# Patient Record
Sex: FEMALE | Race: WHITE | Employment: OTHER | URBAN - METROPOLITAN AREA
[De-identification: names, ages, dates, MRNs, and addresses within clinical notes are randomized per-mention and may not be internally consistent; named-entity substitution may affect disease eponyms.]

---

## 2017-04-10 ENCOUNTER — OFFICE VISIT (OUTPATIENT)
Dept: NEUROLOGY | Age: 77
End: 2017-04-10

## 2017-04-10 VITALS
SYSTOLIC BLOOD PRESSURE: 150 MMHG | BODY MASS INDEX: 39.7 KG/M2 | HEART RATE: 72 BPM | HEIGHT: 60 IN | WEIGHT: 202.2 LBS | DIASTOLIC BLOOD PRESSURE: 80 MMHG | TEMPERATURE: 97.8 F | RESPIRATION RATE: 18 BRPM | OXYGEN SATURATION: 96 %

## 2017-04-10 DIAGNOSIS — R20.2 PARESTHESIA: ICD-10-CM

## 2017-04-10 DIAGNOSIS — G45.9 TRANSIENT CEREBRAL ISCHEMIA, UNSPECIFIED TYPE: ICD-10-CM

## 2017-04-10 DIAGNOSIS — G43.109 MIGRAINE WITH VERTIGO: ICD-10-CM

## 2017-04-10 DIAGNOSIS — G45.0 VERTEBROBASILAR ARTERY SYNDROME: ICD-10-CM

## 2017-04-10 DIAGNOSIS — F41.9 ANXIETY: Primary | ICD-10-CM

## 2017-04-10 DIAGNOSIS — R42 DIZZINESS: ICD-10-CM

## 2017-04-10 RX ORDER — PREGABALIN 50 MG/1
50 CAPSULE ORAL 2 TIMES DAILY
Qty: 60 CAP | Refills: 2 | Status: SHIPPED | OUTPATIENT
Start: 2017-04-10 | End: 2017-08-09 | Stop reason: ALTCHOICE

## 2017-04-10 RX ORDER — MECLIZINE HYDROCHLORIDE 25 MG/1
TABLET ORAL
COMMUNITY
End: 2017-04-10 | Stop reason: SDUPTHER

## 2017-04-10 RX ORDER — FOLIC ACID 1 MG/1
TABLET ORAL DAILY
COMMUNITY
End: 2018-11-29 | Stop reason: SDUPTHER

## 2017-04-10 RX ORDER — DIAZEPAM 10 MG/1
10 TABLET ORAL
COMMUNITY
End: 2017-04-10 | Stop reason: SDUPTHER

## 2017-04-10 RX ORDER — METHOTREXATE 2.5 MG/1
TABLET ORAL
COMMUNITY

## 2017-04-10 RX ORDER — LEVOTHYROXINE SODIUM 75 UG/1
TABLET ORAL
Refills: 0 | COMMUNITY
Start: 2017-03-16 | End: 2017-04-10 | Stop reason: SDUPTHER

## 2017-04-10 RX ORDER — CHOLECALCIFEROL (VITAMIN D3) 125 MCG
CAPSULE ORAL DAILY
COMMUNITY
End: 2022-10-26

## 2017-04-10 RX ORDER — DIAZEPAM 10 MG/1
10 TABLET ORAL
Qty: 60 TAB | Refills: 3 | Status: SHIPPED | OUTPATIENT
Start: 2017-04-10 | End: 2017-08-09 | Stop reason: SDUPTHER

## 2017-04-10 RX ORDER — HYDROXYCHLOROQUINE SULFATE 200 MG/1
200 TABLET, FILM COATED ORAL DAILY
COMMUNITY

## 2017-04-10 RX ORDER — MECLIZINE HYDROCHLORIDE 25 MG/1
25 TABLET ORAL
Qty: 60 TAB | Refills: 3 | Status: SHIPPED | OUTPATIENT
Start: 2017-04-10 | End: 2017-08-09 | Stop reason: SDUPTHER

## 2017-04-10 RX ORDER — VERAPAMIL HYDROCHLORIDE 120 MG/1
120 CAPSULE, EXTENDED RELEASE ORAL DAILY
COMMUNITY
End: 2017-08-09 | Stop reason: SDUPTHER

## 2017-04-10 RX ORDER — ASPIRIN 81 MG/1
TABLET ORAL DAILY
COMMUNITY

## 2017-04-10 RX ORDER — LEVOTHYROXINE SODIUM 75 UG/1
75 TABLET ORAL
Refills: 0 | COMMUNITY
Start: 2017-01-18

## 2017-04-10 RX ORDER — PREDNISONE 5 MG/1
5 TABLET ORAL 2 TIMES DAILY
Qty: 30 TAB | Refills: 0 | Status: SHIPPED | OUTPATIENT
Start: 2017-04-10 | End: 2017-08-09

## 2017-04-10 NOTE — PROGRESS NOTES
Chief Complaint   Patient presents with    Stroke    Medication Refill    New Patient     Sample for Lyrica 50 mg 1 packet of 21 capsules  NDC: 07631-580-56  : Alberto Chowdhury  Lot: [id number]  Expires: 8/2019

## 2017-04-10 NOTE — PROGRESS NOTES
Neurology Progress Note    NAME:  Cecilia Carter   :   1940   MRN:   [id number]     Date/Time:  4/10/2017  Subjective: Cecilia Carter is a 78 y.o. female here today for follow up. Has been having a lot neck pain. Occasional dizziness and headache. No fall. Was recently in the hospital due  SOB and possible pneumonia. Review of Systems:  Per HPI - Otherwise 12 point ROS was negative     []Unable to obtain  ROS due to  []mental status change  []sedated   []intubated    Medications reviewed:  Current Outpatient Prescriptions   Medication Sig Dispense Refill    levothyroxine (SYNTHROID) 100 mcg tablet 120 mcg daily. 0    cholecalciferol, vitamin D3, (VITAMIN D3) 2,000 unit tab Take  by mouth daily.  TIOTROPIUM BROMIDE (SPIRIVA WITH HANDIHALER IN) Take 2 Puffs by inhalation daily.  verapamil ER (VERELAN) 120 mg ER capsule Take 120 mg by mouth daily.  aspirin delayed-release 81 mg tablet Take  by mouth daily.  hydroxychloroquine (PLAQUENIL) 200 mg tablet Take 200 mg by mouth two (2) times a day.  methotrexate (RHEUMATREX) 2.5 mg tablet Take  by mouth every . 6 capsules      folic acid (FOLVITE) 1 mg tablet Take  by mouth daily.  diazePAM (VALIUM) 10 mg tablet Take 1 Tab by mouth every twelve (12) hours as needed for Anxiety. Max Daily Amount: 20 mg. 60 Tab 3    meclizine (ANTIVERT) 25 mg tablet Take 1 Tab by mouth nightly. 60 Tab 3    Oxygen nightly. 2 liters      pregabalin (LYRICA) 50 mg capsule Take 1 Cap by mouth two (2) times a day. Max Daily Amount: 100 mg. 60 Cap 2        Objective:   Vitals:  Vitals:    04/10/17 1351   BP: 150/80   Pulse: 72   Resp: 18   Temp: 97.8 °F (36.6 °C)   TempSrc: Oral   SpO2: 96%   Weight: 202 lb 3.2 oz (91.7 kg)   Height: 5' (1.524 m)   PainSc:   0 - No pain       PHYSICAL EXAM:  General:    Alert, cooperative, no distress, appears stated age. Head:   Normocephalic, without obvious abnormality, atraumatic.   Eyes: Conjunctivae/corneas clear. PERRLA  Nose:  Nares normal. No drainage or sinus tenderness. Throat:    Lips, mucosa, and tongue normal.  No Thrush  Neck:  Supple, symmetrical,  no adenopathy, thyroid: non tender    no carotid bruit and no JVD. Back:    Symmetric,  No CVA tenderness. Lungs:   Clear to auscultation bilaterally. No Wheezing or Rhonchi. No rales. Chest wall:  No tenderness or deformity. No Accessory muscle use. Heart:   Regular rate and rhythm,  no murmur, rub or gallop. Abdomen:   Soft, non-tender. Not distended. Bowel sounds normal. No masses  Extremities: Extremities normal, atraumatic, No cyanosis. No edema. No clubbing  Skin:     Texture, turgor normal. No rashes or lesions. Not Jaundiced  Lymph nodes: Cervical, supraclavicular normal.  Psych:  Good insight. Not depressed. Not anxious or agitated. NEUROLOGICAL EXAM:  Appearance: The patient is well developed, well nourished, provides a coherent history and is in no acute distress. Mental Status: Oriented to time, place and person. Mood and affect appropriate. Cranial Nerves:   Intact visual fields. Fundi are benign. GITA, EOM's full, no nystagmus, no ptosis. Facial sensation is normal. Corneal reflexes are intact. Facial movement is symmetric. Hearing is normal bilaterally. Palate is midline with normal sternocleidomastoid and trapezius muscles are normal. Tongue is midline. Motor:  5/5 strength in upper and lower proximal and distal muscles. Normal bulk and tone. No fasciculations. Reflexes:   Deep tendon reflexes 2+/4 and symmetrical.   Sensory:   Normal to touch, pinprick and vibration. Gait:  Slightly unsteady gait. Tremor:   No tremor noted. Cerebellar:  No cerebellar signs present. Neurovascular:  Normal heart sounds and regular rhythm, peripheral pulses intact, and no carotid bruits. Lab Data Reviewed:    No results found for any previous visit.     CT Results (recent):  No results found for this or any previous visit. MRI Results (recent):  No results found for this or any previous visit. IR Results (recent):  No results found for this or any previous visit. VAS/US Results (recent):  No results found for this or any previous visit. Assesment  There is no problem list on file for this patient.     ___________________________________________________  PLAN:      ICD-10-CM ICD-9-CM    1. Anxiety F41.9 300.00 diazePAM (VALIUM) 10 mg tablet      meclizine (ANTIVERT) 25 mg tablet   2. 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 Migraine with vertigo G43.109 346.00    5. Paresthesia R20.2 782.0 MRI BRAIN W WO CONT     Follow-up Disposition:  Return in about 4 weeks (around 5/8/2017).            ___________________________________________________    Total time spent with patient:  []15   []25   []35   [] __ minutes    Care Plan discussed with:    []Patient   []Family    []Care Manager   []Consultant/Specialist :    ___________________________________________________    Attending Physician: Baljit Jara MD

## 2017-04-10 NOTE — MR AVS SNAPSHOT
Visit Information Date & Time Provider Department Dept. Phone Encounter #  
 4/10/2017  1:40 PM Gavin Peters MD [facility] at [facility] [phone] [id number] Follow-up Instructions Return in about 4 weeks (around 5/8/2017). Upcoming Health Maintenance Date Due DTaP/Tdap/Td series (1 - Tdap) 3/4/1963 ZOSTER VACCINE AGE 60> 3/4/2002 GLAUCOMA SCREENING Q2Y 3/4/2007 OSTEOPOROSIS SCREENING (DEXA) 3/4/2007 Pneumococcal 65+ Low/Medium Risk (1 of 2 - PCV13) 3/4/2007 MEDICARE YEARLY EXAM 3/4/2007 INFLUENZA AGE 9 TO ADULT 8/1/2016 Allergies as of 4/10/2017  Never Reviewed Severity Noted Reaction Type Reactions Codeine  04/10/2017    Swelling Erythromycin  04/10/2017    Itching And swelling Lovastatin  04/10/2017    Swelling Minocin [Minocycline]  04/10/2017    Swelling Neurontin [Gabapentin]  04/10/2017    Swelling Pcn [Penicillins]  04/10/2017    Swelling Sulfa (Sulfonamide Antibiotics)  04/10/2017    Itching Current Immunizations  Never Reviewed No immunizations on file. Not reviewed this visit You Were Diagnosed With   
  
 Codes Comments Anxiety    -  Primary ICD-10-CM: F41.9 ICD-9-CM: 300.00 Dizziness     ICD-10-CM: A78 ICD-9-CM: 780.4 Vertebrobasilar artery syndrome     ICD-10-CM: G45.0 ICD-9-CM: 435.3 Migraine with vertigo     ICD-10-CM: G43.109 ICD-9-CM: 346.00 Paresthesia     ICD-10-CM: R20.2 ICD-9-CM: 525. 0 Vitals BP Pulse Temp Resp Height(growth percentile) Weight(growth percentile) 150/80 (BP 1 Location: Left arm, BP Patient Position: Sitting) 72 97.8 °F (36.6 °C) (Oral) 18 5' (1.524 m) 202 lb 3.2 oz (91.7 kg) SpO2 BMI OB Status Smoking Status 96% 39.49 kg/m2 Menopause Former Smoker Vitals History BMI and BSA Data Body Mass Index Body Surface Area  
 39.49 kg/m 2 1.97 m 2 Preferred Pharmacy Pharmacy Name Phone [facility] [address] [place], [place] - [address] [place] [address] [place] [phone] Your Updated Medication List  
  
   
This list is accurate as of: 4/10/17  2:58 PM.  Always use your most recent med list.  
  
  
  
  
 aspirin delayed-release 81 mg tablet Take  by mouth daily. diazePAM 10 mg tablet Commonly known as:  VALIUM Take 1 Tab by mouth every twelve (12) hours as needed for Anxiety. Max Daily Amount: 20 mg.  
  
 folic acid 1 mg tablet Commonly known as:  Google Take  by mouth daily. hydroxychloroquine 200 mg tablet Commonly known as:  PLAQUENIL Take 200 mg by mouth two (2) times a day. levothyroxine 100 mcg tablet Commonly known as:  SYNTHROID  
120 mcg daily. meclizine 25 mg tablet Commonly known as:  ANTIVERT Take 1 Tab by mouth nightly. methotrexate 2.5 mg tablet Commonly known as:  [place] Take  by mouth every Sunday. 6 capsules Oxygen  
nightly. 2 liters  
  
 pregabalin 50 mg capsule Commonly known as:  Karolynn Arch Take 1 Cap by mouth two (2) times a day. Max Daily Amount: 100 mg. [address] [place] [address] Take 2 Puffs by inhalation daily. verapamil  mg ER capsule Commonly known as:  Luz Enriqueta Take 120 mg by mouth daily. VITAMIN D3 2,000 unit Tab Generic drug:  cholecalciferol (vitamin D3) Take  by mouth daily. Prescriptions Printed Refills  
 diazePAM (VALIUM) 10 mg tablet 3 Sig: Take 1 Tab by mouth every twelve (12) hours as needed for Anxiety. Max Daily Amount: 20 mg.  
 Class: Print Route: Oral  
 pregabalin (LYRICA) 50 mg capsule 2 Sig: Take 1 Cap by mouth two (2) times a day. Max Daily Amount: 100 mg. Class: Print Route: Oral  
  
Prescriptions Sent to Pharmacy Refills  
 meclizine (ANTIVERT) 25 mg tablet 3 Sig: Take 1 Tab by mouth nightly.   
 Class: Normal  
 Pharmacy: [address], [place] Ph #: [phone] Route: Oral  
  
Follow-up Instructions Return in about 4 weeks (around 5/8/2017). To-Do List   
 04/18/2017 Imaging:  MRI BRAIN W WO CONT Introducing [facility] SERVICES! Marli Lehman introduces [employer] patient portal. Now you can access parts of your medical record, email your doctor's office, and request medication refills online. 1. In your internet browser, go to [email or web address] 2. Click on the First Time User? Click Here link in the Sign In box. You will see the New Member Sign Up page. 3. Enter your [employer] Access Code exactly as it appears below. You will not need to use this code after youve completed the sign-up process. If you do not sign up before the expiration date, you must request a new code. · [employer] Access Code: [facility] Expires: 7/9/2017  1:49 PM 
 
4. Enter the last four digits of your Social Security Number (xxxx) and Date of Birth (mm/dd/yyyy) as indicated and click Submit. You will be taken to the next sign-up page. 5. Create a [employer] ID. This will be your [employer] login ID and cannot be changed, so think of one that is secure and easy to remember. 6. Create a [employer] password. You can change your password at any time. 7. Enter your Password Reset Question and Answer. This can be used at a later time if you forget your password. 8. Enter your e-mail address. You will receive e-mail notification when new information is available in [address] 9. Click Sign Up. You can now view and download portions of your medical record. 10. Click the Download Summary menu link to download a portable copy of your medical information. If you have questions, please visit the Frequently Asked Questions section of the [employer] website. Remember, [employer] is NOT to be used for urgent needs. For medical emergencies, dial 911. Now available from your iPhone and Android! Please provide this summary of care documentation to your next provider. Your primary care clinician is listed as Jeanette Lam. If you have any questions after today's visit, please call [phone].

## 2017-04-19 ENCOUNTER — HOSPITAL ENCOUNTER (OUTPATIENT)
Dept: MRI IMAGING | Age: 77
Discharge: HOME | End: 2017-04-19
Attending: PSYCHIATRY & NEUROLOGY
Payer: MEDICARE

## 2017-04-19 DIAGNOSIS — R20.2 PARESTHESIA: ICD-10-CM

## 2017-04-19 DIAGNOSIS — R42 DIZZINESS: ICD-10-CM

## 2017-04-19 DIAGNOSIS — G45.0 VERTEBROBASILAR ARTERY SYNDROME: ICD-10-CM

## 2017-04-19 LAB — CREAT BLD-MCNC: 0.9 MG/DL (ref 0.6–1.3)

## 2017-04-19 PROCEDURE — A9585 GADOBUTROL INJECTION: HCPCS | Performed by: PSYCHIATRY & NEUROLOGY

## 2017-04-19 PROCEDURE — 82565 ASSAY OF CREATININE: CPT

## 2017-04-19 PROCEDURE — 70553 MRI BRAIN STEM W/O & W/DYE: CPT

## 2017-04-19 PROCEDURE — 74011250636 HC RX REV CODE- 250/636: Performed by: PSYCHIATRY & NEUROLOGY

## 2017-04-19 RX ADMIN — GADOBUTROL 9 ML: 604.72 INJECTION INTRAVENOUS at 16:02

## 2017-05-09 ENCOUNTER — OFFICE VISIT (OUTPATIENT)
Dept: NEUROLOGY | Age: 77
End: 2017-05-09

## 2017-05-09 VITALS
SYSTOLIC BLOOD PRESSURE: 125 MMHG | WEIGHT: 204.2 LBS | HEIGHT: 60 IN | RESPIRATION RATE: 18 BRPM | DIASTOLIC BLOOD PRESSURE: 75 MMHG | OXYGEN SATURATION: 94 % | TEMPERATURE: 97.1 F | BODY MASS INDEX: 40.09 KG/M2 | HEART RATE: 75 BPM

## 2017-05-09 DIAGNOSIS — R42 DIZZINESS: ICD-10-CM

## 2017-05-09 DIAGNOSIS — G43.109 MIGRAINE WITH VERTIGO: ICD-10-CM

## 2017-05-09 DIAGNOSIS — M47.812 ARTHROPATHY OF CERVICAL FACET JOINT: ICD-10-CM

## 2017-05-09 DIAGNOSIS — G45.0 VERTEBROBASILAR ARTERY SYNDROME: ICD-10-CM

## 2017-05-09 DIAGNOSIS — R20.2 PARESTHESIA: ICD-10-CM

## 2017-05-09 DIAGNOSIS — G43.019 INTRACTABLE MIGRAINE WITHOUT AURA AND WITHOUT STATUS MIGRAINOSUS: Primary | ICD-10-CM

## 2017-05-09 RX ORDER — TIOTROPIUM BROMIDE INHALATION SPRAY 3.12 UG/1
SPRAY, METERED RESPIRATORY (INHALATION)
COMMUNITY
Start: 2017-04-18 | End: 2017-05-09 | Stop reason: SDUPTHER

## 2017-05-09 NOTE — MR AVS SNAPSHOT
Visit Information Date & Time Provider Department Dept. Phone Encounter #  
 5/9/2017 10:40 AM Gavin Plata MD [facility] at [address] [place] [address] [id number] Follow-up Instructions Return in about 3 months (around 8/9/2017). Upcoming Health Maintenance Date Due DTaP/Tdap/Td series (1 - Tdap) 3/4/1963 ZOSTER VACCINE AGE 60> 3/4/2002 GLAUCOMA SCREENING Q2Y 3/4/2007 OSTEOPOROSIS SCREENING (DEXA) 3/4/2007 Pneumococcal 65+ Low/Medium Risk (1 of 2 - PCV13) 3/4/2007 MEDICARE YEARLY EXAM 3/4/2007 INFLUENZA AGE 9 TO ADULT 8/1/2017 Allergies as of 5/9/2017  Review Complete On: 5/9/2017 By: Vane Pena MD  
  
 Severity Noted Reaction Type Reactions Codeine  04/10/2017    Swelling Erythromycin  04/10/2017    Itching And swelling Lovastatin  04/10/2017    Swelling Minocin [Minocycline]  04/10/2017    Swelling Neurontin [Gabapentin]  04/10/2017    Swelling Pcn [Penicillins]  04/10/2017    Swelling Sulfa (Sulfonamide Antibiotics)  04/10/2017    Itching Current Immunizations  Never Reviewed No immunizations on file. Not reviewed this visit You Were Diagnosed With   
  
 Codes Comments Intractable migraine without aura and without status migrainosus    -  Primary ICD-10-CM: G43.019 
ICD-9-CM: 346.11 Migraine with vertigo     ICD-10-CM: G43.109 ICD-9-CM: 346.00 Paresthesia     ICD-10-CM: R20.2 ICD-9-CM: 898. 0 Vertebrobasilar artery syndrome     ICD-10-CM: G45.0 ICD-9-CM: 435.3 Dizziness     ICD-10-CM: X41 ICD-9-CM: 780.4 Arthropathy of cervical facet joint (HCC)     ICD-10-CM: M12.88 ICD-9-CM: 721.0 Vitals BP Pulse Temp Resp Height(growth percentile) Weight(growth percentile) 125/75 (BP 1 Location: Left arm, BP Patient Position: Sitting) 75 97.1 °F (36.2 °C) (Oral) 18 5' (1.524 m) 204 lb 3.2 oz (92.6 kg) SpO2 BMI OB Status Smoking Status 94% 39.88 kg/m2 Menopause Former Smoker Vitals History BMI and BSA Data Body Mass Index Body Surface Area  
 39.88 kg/m 2 1.98 m 2 Preferred Pharmacy Pharmacy Name Phone [facility] [address] [place], [place] - [address] [place] [address] [employer] [phone] Your Updated Medication List  
  
   
This list is accurate as of: 5/9/17 11:18 AM.  Always use your most recent med list.  
  
  
  
  
 aspirin delayed-release 81 mg tablet Take  by mouth daily. diazePAM 10 mg tablet Commonly known as:  VALIUM Take 1 Tab by mouth every twelve (12) hours as needed for Anxiety. Max Daily Amount: 20 mg.  
  
 folic acid 1 mg tablet Commonly known as:  Google Take  by mouth daily. hydroxychloroquine 200 mg tablet Commonly known as:  PLAQUENIL Take 200 mg by mouth two (2) times a day. levothyroxine 100 mcg tablet Commonly known as:  SYNTHROID  
120 mcg daily. meclizine 25 mg tablet Commonly known as:  ANTIVERT Take 1 Tab by mouth nightly. methotrexate 2.5 mg tablet Commonly known as:  Jannreitta Doll Take  by mouth every Sunday. 6 capsules Oxygen  
nightly. 2 liters  
  
 predniSONE 5 mg tablet Commonly known as:  Matt Halle Take 1 Tab by mouth two (2) times a day. pregabalin 50 mg capsule Commonly known as:  Lexis Whittington Take 1 Cap by mouth two (2) times a day. Max Daily Amount: 100 mg. [address] [place] [address] Take 2 Puffs by inhalation daily. verapamil  mg ER capsule Commonly known as:  [facility] Take 120 mg by mouth daily. VITAMIN D3 2,000 unit Tab Generic drug:  cholecalciferol (vitamin D3) Take  by mouth daily. Follow-up Instructions Return in about 3 months (around 8/9/2017). Introducing [facility] SERVICES! Malik Gibbons introduces [employer] patient portal. Now you can access parts of your medical record, email your doctor's office, and request medication refills online. 1. In your internet browser, go to [email or web address] 2. Click on the First Time User? Click Here link in the Sign In box. You will see the New Member Sign Up page. 3. Enter your [employer] Access Code exactly as it appears below. You will not need to use this code after youve completed the sign-up process. If you do not sign up before the expiration date, you must request a new code. · [employer] Access Code: [address] [place] Expires: 7/9/2017  1:49 PM 
 
4. Enter the last four digits of your Social Security Number (xxxx) and Date of Birth (mm/dd/yyyy) as indicated and click Submit. You will be taken to the next sign-up page. 5. Create a [employer] ID. This will be your [employer] login ID and cannot be changed, so think of one that is secure and easy to remember. 6. Create a [employer] password. You can change your password at any time. 7. Enter your Password Reset Question and Answer. This can be used at a later time if you forget your password. 8. Enter your e-mail address. You will receive e-mail notification when new information is available in [address] 9. Click Sign Up. You can now view and download portions of your medical record. 10. Click the Download Summary menu link to download a portable copy of your medical information. If you have questions, please visit the Frequently Asked Questions section of the [employer] website. Remember, [employer] is NOT to be used for urgent needs. For medical emergencies, dial 911. Now available from your iPhone and Android! Please provide this summary of care documentation to your next provider. Your primary care clinician is listed as Ricardo Vasquez. If you have any questions after today's visit, please call [phone].

## 2017-05-09 NOTE — PROGRESS NOTES
Chief Complaint   Patient presents with    Dizziness    Other     MRI results     1. Have you been to the ER, urgent care clinic since your last visit? Hospitalized since your last visit? no    2. Have you seen or consulted any other health care providers outside of the [address] [place] [address] since your last visit? Include any pap smears or colon screening.  no

## 2017-05-09 NOTE — PROGRESS NOTES
Neurology Progress Note    NAME:  Susie Britt   :   1940   MRN:   [id number]     Date/Time:  2017  Subjective: Susie Britt is a 78 y.o. female here today for follow up. Says she has been having daily headache nagging in nature. Dizziness every night. Says headache has not been very severe, medication has been helping. Dizziness is mostly on trying to lay down. Headache is not associted with nausea or vomiting. .Denies blurry or double vision,diarrhea or constipation. Admits neck and joint pains. Review of Systems:   12 system review negative except for HPI. []Unable to obtain  ROS due to  []mental status change  []sedated   []intubated    Medications reviewed:  Current Outpatient Prescriptions   Medication Sig Dispense Refill    levothyroxine (SYNTHROID) 100 mcg tablet 120 mcg daily. 0    cholecalciferol, vitamin D3, (VITAMIN D3) 2,000 unit tab Take  by mouth daily.  TIOTROPIUM BROMIDE (SPIRIVA WITH HANDIHALER IN) Take 2 Puffs by inhalation daily.  verapamil ER (VERELAN) 120 mg ER capsule Take 120 mg by mouth daily.  aspirin delayed-release 81 mg tablet Take  by mouth daily.  hydroxychloroquine (PLAQUENIL) 200 mg tablet Take 200 mg by mouth two (2) times a day.  methotrexate (RHEUMATREX) 2.5 mg tablet Take  by mouth every . 6 capsules      folic acid (FOLVITE) 1 mg tablet Take  by mouth daily.  diazePAM (VALIUM) 10 mg tablet Take 1 Tab by mouth every twelve (12) hours as needed for Anxiety. Max Daily Amount: 20 mg. 60 Tab 3    meclizine (ANTIVERT) 25 mg tablet Take 1 Tab by mouth nightly. 60 Tab 3    Oxygen nightly. 2 liters      pregabalin (LYRICA) 50 mg capsule Take 1 Cap by mouth two (2) times a day. Max Daily Amount: 100 mg. 60 Cap 2    predniSONE (DELTASONE) 5 mg tablet Take 1 Tab by mouth two (2) times a day.  30 Tab 0        Objective:   Vitals:  Vitals:    17 1043   BP: 125/75   Pulse: 75   Resp: 18   Temp: 97.1 °F (36.2 °C)   TempSrc: Oral SpO2: 94%   Weight: 204 lb 3.2 oz (92.6 kg)   Height: 5' (1.524 m)   PainSc:   0 - No pain               Lab Data Reviewed:  No results found for: WBC, HCT, HGB, PLT, HCTEXT, HGBEXT, PLTEXT, HCTEXT, HGBEXT, PLTEXT    No results found for: NA, K, CL, CO2, GLU, BUN, CREA, CA    No components found for: TROPQUANT    No results found for: PAMELA      No results found for: HBA1C, HGBE8, ECN6HIVU, QMW9XEPI, DIO6CFCB     No results found for: B12LT, FOL, RBCF    No results found for: PAMELA, ANARX, ANAIGG, XBANA    No results found for: CHOL, CHOLPOCT, CHOLX, CHLST, CHOLV, HDL, HDLPOC, LDL, LDLCPOC, NLDLCT, DLDL, LDLC, DLDLP, VLDLC, VLDL, TGL, TGLX, TRIGL, [id number], TRIGP, TGLPOCT, CHHD, CHHDX      CT Results (recent):  No results found for this or any previous visit. MRI Results (recent):    Results from [place] encounter on 04/19/17   MRI BRAIN W WO CONT   Narrative Clinical indication: Dizziness, vertebral basilar artery syndrome, paresthesia,  vertigo. Technical factors: Diffusion imaging, sagittal T1-weighted, axial T1-weighted  pre and post 9 cc IV Gadavist T2-weighted FLAIR gradient echo coronal  T1-weighted postcontrast    Diffusion imaging does not show acute ischemic changes her. There is an  incidental bone lesion within the posterior right parietal both to at the  convexity which is nonspecific. Correlation with CT of that area could be of  value if clinically appropriate. There is no extra-axial fluid collection,  hemorrhage or shift. Major flow voids at the base of the brain are present. Minimal white matter disease nonspecific but compatible with patient's age and  likely some small vessel ischemia. There is no enhancing lesion or masses her. Impression IMPRESSION: No acute intracranial findings. Minimal white matter disease and  atrophy compatible with age. Right-sided bone lesion as above. IR Results (recent):  No results found for this or any previous visit.     VAS/US Results (recent):  No results found for this or any previous visit.            ___________________________________________________  PLAN:Medication  And MRI Brain reviewed with patient  Continue current management      ICD-10-CM ICD-9-CM    1. Intractable migraine without aura and without status migrainosus G43.019 346.11    2. Migraine with vertigo G43.109 346.00    3. Paresthesia R20.2 782.0    4. 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 Dizziness R42 780.4    6. Arthropathy of cervical facet joint ([address] M12.88 721.0      Follow-up Disposition:  Return in about 3 months (around 8/9/2017).          ___________________________________________________    Total time spent with patient:  []15   []25   []35   [] __ minutes    Care Plan discussed with:    []Patient   []Family    []Care Manager   []Consultant/Specialist :    ___________________________________________________    Attending Physician: Shivam Orozco MD

## 2017-08-09 ENCOUNTER — OFFICE VISIT (OUTPATIENT)
Dept: NEUROLOGY | Age: 77
End: 2017-08-09

## 2017-08-09 VITALS
HEART RATE: 70 BPM | DIASTOLIC BLOOD PRESSURE: 70 MMHG | RESPIRATION RATE: 16 BRPM | SYSTOLIC BLOOD PRESSURE: 132 MMHG | TEMPERATURE: 98.2 F | HEIGHT: 60 IN | WEIGHT: 205.8 LBS | BODY MASS INDEX: 40.4 KG/M2 | OXYGEN SATURATION: 98 %

## 2017-08-09 DIAGNOSIS — R42 DIZZINESS: ICD-10-CM

## 2017-08-09 DIAGNOSIS — F11.90 CHRONIC, CONTINUOUS USE OF OPIOIDS: ICD-10-CM

## 2017-08-09 DIAGNOSIS — F41.9 ANXIETY: ICD-10-CM

## 2017-08-09 DIAGNOSIS — G43.909 MIGRAINE WITHOUT STATUS MIGRAINOSUS, NOT INTRACTABLE, UNSPECIFIED MIGRAINE TYPE: Primary | ICD-10-CM

## 2017-08-09 RX ORDER — DIAZEPAM 10 MG/1
10 TABLET ORAL
Qty: 30 TAB | Refills: 3 | Status: SHIPPED | OUTPATIENT
Start: 2017-08-09 | End: 2018-03-08 | Stop reason: SDUPTHER

## 2017-08-09 RX ORDER — MECLIZINE HYDROCHLORIDE 25 MG/1
25 TABLET ORAL
Qty: 60 TAB | Refills: 3 | Status: SHIPPED | OUTPATIENT
Start: 2017-08-09 | End: 2018-05-14 | Stop reason: SDUPTHER

## 2017-08-09 RX ORDER — VERAPAMIL HYDROCHLORIDE 120 MG/1
120 CAPSULE, EXTENDED RELEASE ORAL DAILY
Qty: 30 CAP | Refills: 3 | Status: SHIPPED | OUTPATIENT
Start: 2017-08-09 | End: 2018-03-08 | Stop reason: SDUPTHER

## 2017-08-09 NOTE — PROGRESS NOTES
Date:  17     Name:  Bettina Parra  :  1940  MRN:  [id number]     PCP:  Sherwin Hutchins MD    Chief Complaint   Patient presents with    Migraine    Dizziness    Medication Refill       HISTORY OF PRESENT ILLNESS: Follow up for migraine, stroke, and dizziness. Patient has been doing well since last visit. She does not have any new concerns. She is not having any migraines and her dizziness is being managed with her meclizine. She follows up with her PCP at [address] [place] [address] every 3 month for lab work. Except as noted above, denies  fever, chills, cough. No CP or SOB. No dysuria, loss of bowel or bladder control. No Weight loss. Appetite good. Sleeping well. No sweats. No edema. No bruising or bleeding. No nausea or vomit. No diarrhea. No frequency, urgency, No depressive sxs. No anxiety. Denies sore throat, nasal congestion, nasal discharge, epistaxis, tinnitus, hearing loss, back pain, muscle pain, or joint pain. Current Outpatient Prescriptions   Medication Sig    diazePAM (VALIUM) 10 mg tablet Take 1 Tab by mouth every twelve (12) hours as needed for Anxiety. Max Daily Amount: 20 mg.    meclizine (ANTIVERT) 25 mg tablet Take 1 Tab by mouth nightly.  verapamil ER (VERELAN) 120 mg ER capsule Take 1 Cap by mouth daily.  levothyroxine (SYNTHROID) 100 mcg tablet 120 mcg daily.  cholecalciferol, vitamin D3, (VITAMIN D3) 2,000 unit tab Take  by mouth daily.  TIOTROPIUM BROMIDE (SPIRIVA WITH HANDIHALER IN) Take 2 Puffs by inhalation daily.  aspirin delayed-release 81 mg tablet Take  by mouth daily.  hydroxychloroquine (PLAQUENIL) 200 mg tablet Take 200 mg by mouth two (2) times a day.  methotrexate (RHEUMATREX) 2.5 mg tablet Take  by mouth every . 6 capsules    folic acid (FOLVITE) 1 mg tablet Take  by mouth daily.  Oxygen nightly. 2 liters     No current facility-administered medications for this visit.       Allergies   Allergen Reactions    Codeine Swelling    Erythromycin Itching     And swelling    Lovastatin Swelling    Minocin [Minocycline] Swelling    Naproxen Drowsiness    Neurontin [Gabapentin] Swelling    Pcn [Penicillins] Swelling    Sulfa (Sulfonamide Antibiotics) Itching    Tramadol Drowsiness     Past Medical History:   Diagnosis Date    Anxiety disorder     Cerebral artery occlusion with cerebral infarction ([facility] [address]     COPD (chronic obstructive pulmonary disease) (HCC)     Diabetes (HCC)     Hypertension     Lupus (HCC)     Migraine     Rheumatoid arteritis     Syncope     Thyroid disease     Vertigo      History reviewed. No pertinent surgical history. Social History     Social History    Marital status:      Spouse name: N/A    Number of children: N/A    Years of education: N/A     Occupational History    Not on file. Social History Main Topics    Smoking status: Former Smoker    Smokeless tobacco: Never Used    Alcohol use No    Drug use: No    Sexual activity: Not on file     Other Topics Concern    Not on file     Social History Narrative     Family History   Problem Relation Age of Onset    No Known Problems Mother     No Known Problems Father     Cancer Sister      breast    Cancer Brother      lung    Cancer Sister      breast    Cancer Sister      breast    Cancer Brother      skin       PHYSICAL EXAMINATION:    Visit Vitals    /70 (BP 1 Location: Left arm, BP Patient Position: Sitting)    Pulse 70    Temp 98.2 °F (36.8 °C) (Temporal)    Resp 16    Ht 5' (1.524 m)    Wt 205 lb 12.8 oz (93.4 kg)    SpO2 98%    BMI 40.19 kg/m2     General:  Well defined, nourished, and groomed individual in no acute distress. Neck: Supple, nontender, no bruits, no pain with resistance to active range of motion. Heart: Regular rate and rhythm, no murmurs, rub, or gallop. Normal S1S2.   Lungs:  Clear to auscultation bilaterally with equal chest expansion, no cough, no wheeze  Musculoskeletal:  Extremities revealed no edema and had full range of motion of joints. Psych:  Good mood and bright affect    NEUROLOGICAL EXAMINATION:     Mental Status:   Alert and oriented to person, place, and time with recent and remote memory intact. Attention span and concentration are normal. Speech is fluent with a full fund of knowledge. Cranial Nerves:    II, III, IV, VI:  Visual acuity grossly intact. Visual fields are normal.    Pupils are equal, round, and reactive to light and accommodation. Extra-ocular movements are full and fluid. Fundoscopic exam was benign, no ptosis or nystagmus. V-XII: Hearing is grossly intact. Facial features are symmetric, with normal sensation and strength. The palate rises symmetrically and the tongue protrudes midline. Sternocleidomastoids 5/5. Motor Examination: Normal tone, bulk, and strength, 5/5 muscle strength throughout. Sensory exam:  Normal throughout to pinprick, temperature, and vibration sense. Normal proprioception. Coordination: Finger to nose and rapid arm movement testing was normal.     Gait and Station:  Steady while walking     Reflexes:  DTRs 2+ throughout. ASSESSMENT AND PLAN    ICD-10-CM ICD-9-CM    1. Migraine without status migrainosus, not intractable, unspecified migraine type G43.909 346.90 verapamil ER (VERELAN) 120 mg ER capsule   2. Anxiety F41.9 300.00 diazePAM (VALIUM) 10 mg tablet      meclizine (ANTIVERT) 25 mg tablet   3. Dizziness R42 780.4 diazePAM (VALIUM) 10 mg tablet      meclizine (ANTIVERT) 25 mg tablet   4. Chronic, continuous use of opioids F11.90 305.51 [facility] [place]    Ms. Arlet Fabian is doing well. Will continue medication as Prescribed. Patient will follow up with PCP for her stroke lab work(LIPID PANEL,hbA1). Would like for her to bring a copy of her labs work at her next visit. Follow up in 3 months.     Estephanie PARISH

## 2017-08-09 NOTE — MR AVS SNAPSHOT
Visit Information Date & Time Provider Department Dept. Phone Encounter #  
 8/9/2017  9:40 AM Gavin Teran MD [facility] at [facility] [phone] [id number] Upcoming Health Maintenance Date Due DTaP/Tdap/Td series (1 - Tdap) 3/4/1963 ZOSTER VACCINE AGE 60> 1/4/2002 GLAUCOMA SCREENING Q2Y 3/4/2007 OSTEOPOROSIS SCREENING (DEXA) 3/4/2007 Pneumococcal 65+ Low/Medium Risk (1 of 2 - PCV13) 3/4/2007 MEDICARE YEARLY EXAM 3/4/2007 INFLUENZA AGE 9 TO ADULT 8/1/2017 Allergies as of 8/9/2017  Review Complete On: 8/9/2017 By: Megan Guzman NP Severity Noted Reaction Type Reactions Codeine  04/10/2017    Swelling Erythromycin  04/10/2017    Itching And swelling Lovastatin  04/10/2017    Swelling Minocin [Minocycline]  04/10/2017    Swelling Naproxen  08/09/2017    Drowsiness Neurontin [Gabapentin]  04/10/2017    Swelling Pcn [Penicillins]  04/10/2017    Swelling Sulfa (Sulfonamide Antibiotics)  04/10/2017    Itching Tramadol  08/09/2017    Drowsiness Current Immunizations  Never Reviewed No immunizations on file. Not reviewed this visit You Were Diagnosed With   
  
 Codes Comments Migraine without status migrainosus, not intractable, unspecified migraine type    -  Primary ICD-10-CM: J69.309 ICD-9-CM: 346.90 Anxiety     ICD-10-CM: F41.9 ICD-9-CM: 300.00 Dizziness     ICD-10-CM: S71 ICD-9-CM: 780. 4 Chronic, continuous use of opioids     ICD-10-CM: F11.90 ICD-9-CM: 305.51 Vitals BP Pulse Temp Resp Height(growth percentile) 132/70 (BP 1 Location: Left arm, BP Patient Position: Sitting) 70 98.2 °F (36.8 °C) (Temporal) 16 5' (1.524 m) Weight(growth percentile) SpO2 BMI OB Status Smoking Status 205 lb 12.8 oz (93.4 kg) 98% 40.19 kg/m2 Menopause Former Smoker BMI and BSA Data  Body Mass Index Body Surface Area  
 40.19 kg/m 2 1.99 m 2  
  
  
 Preferred Pharmacy Pharmacy Name Phone [facility] [place], [place] - [address] [place] [address] [employer] [employer] [phone] Your Updated Medication List  
  
   
This list is accurate as of: 8/9/17 10:19 AM.  Always use your most recent med list.  
  
  
  
  
 aspirin delayed-release 81 mg tablet Take  by mouth daily. diazePAM 10 mg tablet Commonly known as:  VALIUM Take 1 Tab by mouth every twelve (12) hours as needed for Anxiety. Max Daily Amount: 20 mg.  
  
 folic acid 1 mg tablet Commonly known as:  Google Take  by mouth daily. hydroxychloroquine 200 mg tablet Commonly known as:  PLAQUENIL Take 200 mg by mouth two (2) times a day. levothyroxine 100 mcg tablet Commonly known as:  SYNTHROID  
120 mcg daily. meclizine 25 mg tablet Commonly known as:  ANTIVERT Take 1 Tab by mouth nightly. methotrexate 2.5 mg tablet Commonly known as:  Allison Haver Take  by mouth every Sunday. 6 capsules Oxygen  
nightly. 2 liters [address] [place], [place] [address]way Take 2 Puffs by inhalation daily. verapamil  mg ER capsule Commonly known as:  Alisa Elana Take 1 Cap by mouth daily. VITAMIN D3 2,000 unit Tab Generic drug:  cholecalciferol (vitamin D3) Take  by mouth daily. Prescriptions Printed Refills  
 diazePAM (VALIUM) 10 mg tablet 3 Sig: Take 1 Tab by mouth every twelve (12) hours as needed for Anxiety. Max Daily Amount: 20 mg.  
 Class: Print Route: Oral  
  
Prescriptions Sent to Pharmacy Refills  
 meclizine (ANTIVERT) 25 mg tablet 3 Sig: Take 1 Tab by mouth nightly. Class: Normal  
 Pharmacy: [id number] [place] [address] Ph #: [phone] Route: Oral  
 verapamil ER (VERELAN) 120 mg ER capsule 3 Sig: Take 1 Cap by mouth daily. Class: Normal  
 Pharmacy: [id number] [place] [address] Ph #: [phone]  Route: Oral  
  
 We Performed the Following [facility] MONITORING [[id number] Custom] Introducing [facility]! Faby Eve introduces [employer] patient portal. Now you can access parts of your medical record, email your doctor's office, and request medication refills online. 1. In your internet browser, go to [email or web address]. [email or web address] 2. Click on the First Time User? Click Here link in the Sign In box. You will see the New Member Sign Up page. 3. Enter your [employer] Access Code exactly as it appears below. You will not need to use this code after youve completed the sign-up process. If you do not sign up before the expiration date, you must request a new code. · [employer] Access Code: [email or web address] Expires: 11/7/2017 10:19 AM 
 
4. Enter the last four digits of your Social Security Number (xxxx) and Date of Birth (mm/dd/yyyy) as indicated and click Submit. You will be taken to the next sign-up page. 5. Create a [employer] ID. This will be your [employer] login ID and cannot be changed, so think of one that is secure and easy to remember. 6. Create a [employer] password. You can change your password at any time. 7. Enter your Password Reset Question and Answer. This can be used at a later time if you forget your password. 8. Enter your e-mail address. You will receive e-mail notification when new information is available in [address] 9. Click Sign Up. You can now view and download portions of your medical record. 10. Click the Download Summary menu link to download a portable copy of your medical information. If you have questions, please visit the Frequently Asked Questions section of the [employer] website. Remember, [employer] is NOT to be used for urgent needs. For medical emergencies, dial 911. Now available from your iPhone and Android! Please provide this summary of care documentation to your next provider. Your primary care clinician is listed as Aislinn Buckner. If you have any questions after today's visit, please call [phone].

## 2017-08-09 NOTE — PROGRESS NOTES
Chief Complaint   Patient presents with    Migraine    Dizziness    Medication Refill     1. Have you been to the ER, urgent care clinic since your last visit? Hospitalized since your last visit? Urgent care [facility]    2. Have you seen or consulted any other health care providers outside of the [address] [place] [address] since your last visit? Include any pap smears or colon screening. Urgent care- [facility]    Patient signed control substance contract and urine specimen obtained for toxicology screening.

## 2017-08-16 LAB — DRUGS UR: NORMAL

## 2017-11-13 ENCOUNTER — OFFICE VISIT (OUTPATIENT)
Dept: NEUROLOGY | Age: 77
End: 2017-11-13

## 2017-11-13 VITALS
DIASTOLIC BLOOD PRESSURE: 77 MMHG | SYSTOLIC BLOOD PRESSURE: 131 MMHG | TEMPERATURE: 98.1 F | BODY MASS INDEX: 39.58 KG/M2 | RESPIRATION RATE: 18 BRPM | WEIGHT: 201.6 LBS | HEART RATE: 73 BPM | OXYGEN SATURATION: 97 % | HEIGHT: 60 IN

## 2017-11-13 DIAGNOSIS — R42 DIZZINESS: Primary | ICD-10-CM

## 2017-11-13 DIAGNOSIS — R20.2 PARESTHESIA: ICD-10-CM

## 2017-11-13 DIAGNOSIS — G43.009 MIGRAINE WITHOUT AURA AND WITHOUT STATUS MIGRAINOSUS, NOT INTRACTABLE: ICD-10-CM

## 2017-11-13 DIAGNOSIS — M47.819 VERTEBRAL ARTHROPATHY: ICD-10-CM

## 2017-11-13 NOTE — PROGRESS NOTES
Chief Complaint   Patient presents with    Migraine     1. Have you been to the ER, urgent care clinic since your last visit? Hospitalized since your last visit? no    2. Have you seen or consulted any other health care providers outside of the [address] [place] [address] since your last visit? Include any pap smears or colon screening.  Dr. Nasir Lee

## 2017-11-13 NOTE — PROGRESS NOTES
Neurology Progress Note    NAME:  Vinay Arellano   :   1940   MRN:   [id number]     Date/Time:  2017  Subjective: Vinay Arellano is a 78 y.o. female here today for  Follow up for headache and dizziness. Says she has been doing fairly well, headache waxes and wanes, frequency has decreased since last visit. She occasionally experiences dizziness, says when does , medication has been helping  Review of Systems - General ROS: positive for  - fatigue  Psychological ROS: positive for - anxiety  Ophthalmic ROS: positive for - blurry vision, decreased vision and photophobia  ENT ROS: positive for - headaches, tinnitus, vertigo and visual changes  Allergy and Immunology ROS: negative  Hematological and Lymphatic ROS: negative  Endocrine ROS: negative  Respiratory ROS: no cough, shortness of breath, or wheezing  Cardiovascular ROS: no chest pain or dyspnea on exertion  Gastrointestinal ROS: no abdominal pain, change in bowel habits, or black or bloody stools  Genito-Urinary ROS: no dysuria, trouble voiding, or hematuria  Neurological ROS: positive for - headaches and weakness  Medications reviewed:  Current Outpatient Prescriptions   Medication Sig Dispense Refill    diazePAM (VALIUM) 10 mg tablet Take 1 Tab by mouth every twelve (12) hours as needed for Anxiety. Max Daily Amount: 20 mg. 30 Tab 3    meclizine (ANTIVERT) 25 mg tablet Take 1 Tab by mouth nightly. 60 Tab 3    verapamil ER (VERELAN) 120 mg ER capsule Take 1 Cap by mouth daily. 30 Cap 3    levothyroxine (SYNTHROID) 100 mcg tablet 120 mcg daily. 0    TIOTROPIUM BROMIDE (SPIRIVA WITH HANDIHALER IN) Take 2 Puffs by inhalation daily.  aspirin delayed-release 81 mg tablet Take  by mouth daily.  hydroxychloroquine (PLAQUENIL) 200 mg tablet Take 200 mg by mouth two (2) times a day.  methotrexate (RHEUMATREX) 2.5 mg tablet Take  by mouth every . 6 capsules      folic acid (FOLVITE) 1 mg tablet Take  by mouth daily.       Oxygen nightly. 2 liters      cholecalciferol, vitamin D3, (VITAMIN D3) 2,000 unit tab Take  by mouth daily. Objective:   Vitals:  Vitals:    11/13/17 0958   BP: 131/77   Pulse: 73   Resp: 18   Temp: 98.1 °F (36.7 °C)   TempSrc: Temporal   SpO2: 97%   Weight: 201 lb 9.6 oz (91.4 kg)   Height: 5' (1.524 m)   PainSc:   0 - No pain       Lab Data Reviewed:  No results found for: WBC, HCT, HGB, PLT, HCTEXT, HGBEXT, PLTEXT    No results found for: NA, K, CL, CO2, GLU, BUN, CREA, CA    No components found for: TROPQUANT    No results found for: PAMELA      No results found for: HBA1C, HGBE8, YDB3WVZR, ELF2RVFX     No results found for: B12LT, FOL, RBCF    No results found for: PAMELA, ANARX, ANAIGG, XBANA    No results found for: CHOL, CHOLPOCT, CHOLX, CHLST, CHOLV, HDL, HDLPOC, LDL, LDLCPOC, LDLC, DLDLP, VLDLC, VLDL, TGLX, TRIGL, TRIGP, TGLPOCT, CHHD, CHHDX      CT Results (recent):  No results found for this or any previous visit. MRI Results (recent):    Results from [place] encounter on 04/19/17   MRI BRAIN W WO CONT   Narrative Clinical indication: Dizziness, vertebral basilar artery syndrome, paresthesia,  vertigo. Technical factors: Diffusion imaging, sagittal T1-weighted, axial T1-weighted  pre and post 9 cc IV Gadavist T2-weighted FLAIR gradient echo coronal  T1-weighted postcontrast    Diffusion imaging does not show acute ischemic changes her. There is an  incidental bone lesion within the posterior right parietal both to at the  convexity which is nonspecific. Correlation with CT of that area could be of  value if clinically appropriate. There is no extra-axial fluid collection,  hemorrhage or shift. Major flow voids at the base of the brain are present. Minimal white matter disease nonspecific but compatible with patient's age and  likely some small vessel ischemia. There is no enhancing lesion or masses her. Impression IMPRESSION: No acute intracranial findings.  Minimal white matter disease and  atrophy compatible with age. Right-sided bone lesion as above. IR Results (recent):  No results found for this or any previous visit. VAS/US Results (recent):  No results found for this or any previous visit. PHYSICAL EXAM:  General:    Alert, cooperative, no distress, appears stated age. Head:   Normocephalic, without obvious abnormality, atraumatic. Eyes:   Conjunctivae/corneas clear. PERRLA  Nose:  Nares normal. No drainage or sinus tenderness. Throat:    Lips, mucosa, and tongue normal.  No Thrush  Neck:  Supple, symmetrical,  no adenopathy, thyroid: non tender    no carotid bruit and no JVD. Back:    Symmetric,  No CVA tenderness. Lungs:   Clear to auscultation bilaterally. No Wheezing or Rhonchi. No rales. Chest wall:  No tenderness or deformity. No Accessory muscle use. Heart:   Regular rate and rhythm,  no murmur, rub or gallop. Abdomen:   Soft, non-tender. Not distended. Bowel sounds normal. No masses  Extremities: Extremities normal, atraumatic, No cyanosis. No edema. No clubbing  Skin:     Texture, turgor normal. No rashes or lesions. Not Jaundiced  Lymph nodes: Cervical, supraclavicular normal.  Psych:  Good insight. Not depressed. Not anxious or agitated. NEUROLOGICAL EXAM:  Appearance: The patient is well developed, well nourished, provides a coherent history and is in no acute distress. Mental Status: Oriented to time, place and person. Mood and affect appropriate. Cranial Nerves:   Intact visual fields. Fundi are benign. GITA, EOM's full, no nystagmus, no ptosis. Facial sensation is normal. Corneal reflexes are intact. Facial movement is symmetric. Hearing is normal bilaterally. Palate is midline with normal sternocleidomastoid and trapezius muscles are normal. Tongue is midline. Motor:  5/5 strength in upper and lower proximal and distal muscles. Normal bulk and tone. No fasciculations.    Reflexes:   Deep tendon reflexes 2+/4 and symmetrical.   Sensory:   Decreased sensation to touch, pinprick and vibration. Gait:  Normal gait. Tremor:   No tremor noted. Cerebellar:  No cerebellar signs present. Neurovascular:  Normal heart sounds and regular rhythm, peripheral pulses intact, and no carotid bruits. Assesment  1. Dizziness    Stable  2. Migraine without aura and without status migrainosus, not intractable  Stable    3. Paresthesia  Stable    4. Vertebral arthropathy  Stable    ___________________________________________________  PLAN:Medication discussed with patient      ICD-10-CM ICD-9-CM    1. Dizziness R42 780.4    2. Migraine without aura and without status migrainosus, not intractable G43.009 346.10    3. Paresthesia R20.2 782.0    4. 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            ___________________________________________________    Total time spent with patient:  []15   []25   []35   [] __ minutes    Care Plan discussed with:    [x]Patient   []Family    []Care Manager   []Consultant/Specialist :    ___________________________________________________    Attending Physician: Marli Richard MD

## 2018-03-08 DIAGNOSIS — G43.909 MIGRAINE WITHOUT STATUS MIGRAINOSUS, NOT INTRACTABLE, UNSPECIFIED MIGRAINE TYPE: ICD-10-CM

## 2018-03-08 DIAGNOSIS — R42 DIZZINESS: ICD-10-CM

## 2018-03-08 DIAGNOSIS — F41.9 ANXIETY: ICD-10-CM

## 2018-03-09 RX ORDER — VERAPAMIL HYDROCHLORIDE 120 MG/1
120 CAPSULE, EXTENDED RELEASE ORAL DAILY
Qty: 30 CAP | Refills: 3 | Status: SHIPPED | OUTPATIENT
Start: 2018-03-09 | End: 2018-05-14 | Stop reason: SDUPTHER

## 2018-03-09 RX ORDER — DIAZEPAM 10 MG/1
10 TABLET ORAL
Qty: 30 TAB | Refills: 3 | Status: SHIPPED | OUTPATIENT
Start: 2018-03-09 | End: 2018-05-14 | Stop reason: SDUPTHER

## 2018-05-14 ENCOUNTER — OFFICE VISIT (OUTPATIENT)
Dept: NEUROLOGY | Age: 78
End: 2018-05-14

## 2018-05-14 VITALS
DIASTOLIC BLOOD PRESSURE: 91 MMHG | HEIGHT: 60 IN | HEART RATE: 69 BPM | BODY MASS INDEX: 41.58 KG/M2 | OXYGEN SATURATION: 97 % | RESPIRATION RATE: 18 BRPM | SYSTOLIC BLOOD PRESSURE: 155 MMHG | TEMPERATURE: 98.1 F | WEIGHT: 211.8 LBS

## 2018-05-14 DIAGNOSIS — R42 DIZZINESS: ICD-10-CM

## 2018-05-14 DIAGNOSIS — F41.9 ANXIETY: ICD-10-CM

## 2018-05-14 DIAGNOSIS — R20.2 PARESTHESIA: Primary | ICD-10-CM

## 2018-05-14 DIAGNOSIS — G43.909 MIGRAINE WITHOUT STATUS MIGRAINOSUS, NOT INTRACTABLE, UNSPECIFIED MIGRAINE TYPE: ICD-10-CM

## 2018-05-14 PROBLEM — E66.01 OBESITY, MORBID (HCC): Status: ACTIVE | Noted: 2018-05-14

## 2018-05-14 RX ORDER — VERAPAMIL HYDROCHLORIDE 120 MG/1
120 CAPSULE, EXTENDED RELEASE ORAL DAILY
Qty: 30 CAP | Refills: 3 | Status: SHIPPED | OUTPATIENT
Start: 2018-05-14 | End: 2018-07-16 | Stop reason: SDUPTHER

## 2018-05-14 RX ORDER — DIAZEPAM 10 MG/1
10 TABLET ORAL
Qty: 30 TAB | Refills: 3 | Status: SHIPPED | OUTPATIENT
Start: 2018-05-14 | End: 2018-07-16 | Stop reason: SDUPTHER

## 2018-05-14 RX ORDER — PREDNISONE 5 MG/1
5 TABLET ORAL 2 TIMES DAILY
Qty: 30 TAB | Refills: 0 | Status: SHIPPED | OUTPATIENT
Start: 2018-05-14 | End: 2018-07-16 | Stop reason: DRUGHIGH

## 2018-05-14 RX ORDER — MECLIZINE HYDROCHLORIDE 25 MG/1
25 TABLET ORAL
Qty: 60 TAB | Refills: 3 | Status: SHIPPED | OUTPATIENT
Start: 2018-05-14 | End: 2022-01-31

## 2018-05-14 NOTE — PROGRESS NOTES
Neurology Progress Note    NAME:  Johnny May   :   1940   MRN:   [id number]     Date/Time:  2018  Subjective: Johnny May is a 70 y.o. female here today for headaches, dizziness. Patient returns today for follow-up for headache and dizziness. She said that she will be experiencing frequent headaches. She had sharp pain on the left side of the head affecting the left eye, according to patient, the patient made the eye to red. She said the headache was associated with dizziness and tingling sensation of the extremity. Patient was recently hospitalized because her oxygen dropped, she is currently on home oxygen. Because of the frequency and intensity of this headache, I will obtain MRI of the brain to evaluate this change in headache. Patient however denies loss of consciousness, dysphagia, odynophagia, incontinence.   Review of Systems - General ROS: positive for  - fatigue, night sweats and sleep disturbance  Psychological ROS: positive for - anxiety and sleep disturbances  Ophthalmic ROS: positive for - blurry vision, decreased vision, double vision and photophobia  ENT ROS: positive for - headaches, tinnitus, vertigo and visual changes  Allergy and Immunology ROS: negative  Hematological and Lymphatic ROS: negative  Endocrine ROS: negative  Respiratory ROS: no cough, shortness of breath, or wheezing  Cardiovascular ROS: no chest pain or dyspnea on exertion  Gastrointestinal ROS: no abdominal pain, change in bowel habits, or black or bloody stools  Genito-Urinary ROS: no dysuria, trouble voiding, or hematuria  Musculoskeletal ROS: positive for - joint pain, joint stiffness, joint swelling, muscle pain and muscular weakness  Neurological ROS: positive for - dizziness, headaches, impaired coordination/balance, numbness/tingling, visual changes and weakness  Dermatological ROS: negative    Medications reviewed:  Current Outpatient Prescriptions   Medication Sig Dispense Refill    verapamil ER (VERELAN) 120 mg ER capsule Take 1 Cap by mouth daily. 30 Cap 3    diazePAM (VALIUM) 10 mg tablet Take 1 Tab by mouth every twelve (12) hours as needed for Anxiety. Max Daily Amount: 20 mg. 30 Tab 3    meclizine (ANTIVERT) 25 mg tablet Take 1 Tab by mouth nightly. 60 Tab 3    levothyroxine (SYNTHROID) 100 mcg tablet 120 mcg daily. 0    cholecalciferol, vitamin D3, (VITAMIN D3) 2,000 unit tab Take  by mouth daily.  TIOTROPIUM BROMIDE (SPIRIVA WITH HANDIHALER IN) Take 2 Puffs by inhalation daily.  aspirin delayed-release 81 mg tablet Take  by mouth daily.  hydroxychloroquine (PLAQUENIL) 200 mg tablet Take 200 mg by mouth two (2) times a day.  methotrexate (RHEUMATREX) 2.5 mg tablet Take  by mouth every Sunday. 6 capsules      folic acid (FOLVITE) 1 mg tablet Take  by mouth daily.  Oxygen nightly. 2 liters          Objective:   Vitals: There were no vitals filed for this visit. Lab Data Reviewed:  No results found for: WBC, HCT, HGB, PLT, HCTEXT, HGBEXT, PLTEXT    No results found for: NA, K, CL, CO2, GLU, BUN, CREA, CA    No components found for: TROPQUANT    No results found for: PAMELA      No results found for: HBA1C, HGBE8, DDT9XKQB, FLB4MSLB     No results found for: B12LT, FOL, RBCF    No results found for: PAMELA, ANARX, ANAIGG, XBANA    No results found for: CHOL, CHOLPOCT, CHOLX, CHLST, CHOLV, HDL, HDLPOC, LDL, LDLCPOC, LDLC, DLDLP, VLDLC, VLDL, TGLX, TRIGL, TRIGP, TGLPOCT, CHHD, CHHDX      CT Results (recent):  No results found for this or any previous visit. MRI Results (recent):    Results from [place] encounter on 04/19/17   MRI BRAIN W WO CONT   Narrative Clinical indication: Dizziness, vertebral basilar artery syndrome, paresthesia,  vertigo.     Technical factors: Diffusion imaging, sagittal T1-weighted, axial T1-weighted  pre and post 9 cc IV Gadavist T2-weighted FLAIR gradient echo coronal  T1-weighted postcontrast    Diffusion imaging does not show acute ischemic changes her. There is an  incidental bone lesion within the posterior right parietal both to at the  convexity which is nonspecific. Correlation with CT of that area could be of  value if clinically appropriate. There is no extra-axial fluid collection,  hemorrhage or shift. Major flow voids at the base of the brain are present. Minimal white matter disease nonspecific but compatible with patient's age and  likely some small vessel ischemia. There is no enhancing lesion or masses her. Impression IMPRESSION: No acute intracranial findings. Minimal white matter disease and  atrophy compatible with age. Right-sided bone lesion as above. IR Results (recent):  No results found for this or any previous visit. VAS/US Results (recent):  No results found for this or any previous visit. PHYSICAL EXAM:  General:    Alert, cooperative, no distress, appears stated age. Head:   Normocephalic, without obvious abnormality, atraumatic. Eyes:   Conjunctivae/corneas clear. PERRLA  Nose:  Nares normal. No drainage or sinus tenderness. Throat:    Lips, mucosa, and tongue normal.  No Thrush  Neck:  Supple, symmetrical,  no adenopathy, thyroid: non tender    no carotid bruit and no JVD. Back:    Symmetric,  No CVA tenderness. Lungs:   Clear to auscultation bilaterally. No Wheezing or Rhonchi. No rales. Chest wall:  No tenderness or deformity. No Accessory muscle use. Heart:   Regular rate and rhythm,  no murmur, rub or gallop. Abdomen:   Soft, non-tender. Not distended. Bowel sounds normal. No masses  Extremities: Extremities normal, atraumatic, No cyanosis. No edema. No clubbing  Skin:     Texture, turgor normal. No rashes or lesions. Not Jaundiced  Lymph nodes: Cervical, supraclavicular normal.  Psych:  Good insight. Not depressed. Anxious . NEUROLOGICAL EXAM:  Appearance:   The patient is well developed, well nourished, provides a coherent history and is in no acute distress. Mental Status: Oriented to time, place and person. Mood and affect appropriate. Cranial Nerves:   Intact visual fields. Fundi are benign. GITA, EOM's full, no nystagmus, no ptosis. Facial sensation is normal. Corneal reflexes are intact. Facial movement is symmetric. Hearing is normal bilaterally. Palate is midline with normal sternocleidomastoid and trapezius muscles are normal. Tongue is midline. Motor:  5/5 strength in upper and lower proximal and distal muscles. Normal bulk and tone. No fasciculations. Reflexes:   Deep tendon reflexes 2+/4 and symmetrical.   Sensory:   Normal to touch, pinprick and vibration. Gait:  Normal gait. Tremor:   No tremor noted. Cerebellar:  No cerebellar signs present. Neurovascular:  Normal heart sounds and regular rhythm, peripheral pulses intact, and no carotid bruits. Assesment  1. Anxiety    - meclizine (ANTIVERT) 25 mg tablet; Take 1 Tab by mouth nightly. Dispense: 60 Tab; Refill: 3  - diazePAM (VALIUM) 10 mg tablet; Take 1 Tab by mouth every twelve (12) hours as needed for Anxiety. Max Daily Amount: 20 mg. Dispense: 30 Tab; Refill: 3    2. Dizziness    - meclizine (ANTIVERT) 25 mg tablet; Take 1 Tab by mouth nightly. Dispense: 60 Tab; Refill: 3  - diazePAM (VALIUM) 10 mg tablet; Take 1 Tab by mouth every twelve (12) hours as needed for Anxiety. Max Daily Amount: 20 mg. Dispense: 30 Tab; Refill: 3    3. Migraine without status migrainosus, not intractable, unspecified migraine type    - verapamil ER (VERELAN) 120 mg ER capsule; Take 1 Cap by mouth daily. Dispense: 30 Cap; Refill: 3    4. Paresthesia  Stable    ___________________________________________________  PLAN:      ICD-10-CM ICD-9-CM    1. Paresthesia R20.2 782.0    2. Anxiety F41.9 300.00 meclizine (ANTIVERT) 25 mg tablet      diazePAM (VALIUM) 10 mg tablet   3. Dizziness R42 780.4 meclizine (ANTIVERT) 25 mg tablet      diazePAM (VALIUM) 10 mg tablet   4.  Migraine without status migrainosus, not intractable, unspecified migraine type G43.909 346.90 verapamil ER (VERELAN) 120 mg ER capsule     Follow-up Disposition:  Return in about 3 months (around 8/14/2018).          ___________________________________________________    Total time spent with patient:  []15   []25   []35   [] __ minutes    Care Plan discussed with:    [x]Patient   []Family    []Care Manager   []Consultant/Specialist :    ___________________________________________________    Attending Physician: Manolo Linn MD

## 2018-05-14 NOTE — PROGRESS NOTES
Chief Complaint   Patient presents with    Migraine    Numbness     Lip     1. Have you been to the ER, urgent care clinic since your last visit? Hospitalized since your last visit? [address] [facility]    2. Have you seen or consulted any other health care providers outside of the [facility] since your last visit? Include any pap smears or colon screening.  [facility], [address] [facility]    Pill count 0      Pill count verified with patient  Patient reports taking medication     UDS obtained 3/16/18  Pain contract on file   made available   Script given for Valium

## 2018-05-14 NOTE — MR AVS SNAPSHOT
[address] [place] [address] 
[phone] Patient: Malorie Esteban MRN: [id number] Visit Information Date & Time Provider Department Dept. Phone Encounter #  
 5/14/2018  9:40 AM Gavin Taylor MD [facility] [phone] [id number] Follow-up Instructions Return in about 3 months (around 8/14/2018). Upcoming Health Maintenance Date Due DTaP/Tdap/Td series (1 - Tdap) 3/4/1963 ZOSTER VACCINE AGE 60> 1/4/2002 GLAUCOMA SCREENING Q2Y 3/4/2007 Bone Densitometry (Dexa) Screening 3/4/2007 Pneumococcal 65+ Low/Medium Risk (1 of 2 - PCV13) 3/4/2007 MEDICARE YEARLY EXAM 3/14/2018 Influenza Age 5 to Adult 8/1/2018 Allergies as of 5/14/2018  Review Complete On: 5/14/2018 By: Radha Don MD  
  
 Severity Noted Reaction Type Reactions Codeine  04/10/2017    Swelling Erythromycin  04/10/2017    Itching And swelling Lovastatin  04/10/2017    Swelling Minocin [Minocycline]  04/10/2017    Swelling Naproxen  08/09/2017    Drowsiness Neurontin [Gabapentin]  04/10/2017    Swelling Pcn [Penicillins]  04/10/2017    Swelling Sulfa (Sulfonamide Antibiotics)  04/10/2017    Itching Tramadol  08/09/2017    Drowsiness Current Immunizations  Never Reviewed No immunizations on file. Not reviewed this visit You Were Diagnosed With   
  
 Codes Comments Paresthesia    -  Primary ICD-10-CM: R20.2 ICD-9-CM: 782.0 Anxiety     ICD-10-CM: F41.9 ICD-9-CM: 300.00 Dizziness     ICD-10-CM: I36 ICD-9-CM: 780.4 Migraine without status migrainosus, not intractable, unspecified migraine type     ICD-10-CM: G43.909 ICD-9-CM: 346.90 Vitals BP Pulse Temp Resp Height(growth percentile) (!) 155/91 (BP 1 Location: Right arm, BP Patient Position: Sitting) 69 98.1 °F (36.7 °C) (Temporal) 18 5' (1.524 m) Weight(growth percentile) SpO2 BMI OB Status Smoking Status 211 lb 12.8 oz (96.1 kg) 97% 41.36 kg/m2 Menopause Former Smoker BMI and BSA Data Body Mass Index Body Surface Area  
 41.36 kg/m 2 2.02 m 2 Preferred Pharmacy Pharmacy Name Phone [facility] [address] [facility], [place] Eid [phone] Your Updated Medication List  
  
   
This list is accurate as of 5/14/18 10:24 AM.  Always use your most recent med list.  
  
  
  
  
 aspirin delayed-release 81 mg tablet Take  by mouth daily. diazePAM 10 mg tablet Commonly known as:  VALIUM Take 1 Tab by mouth every twelve (12) hours as needed for Anxiety. Max Daily Amount: 20 mg.  
  
 folic acid 1 mg tablet Commonly known as:  Google Take  by mouth daily. hydroxychloroquine 200 mg tablet Commonly known as:  PLAQUENIL Take 200 mg by mouth two (2) times a day. levothyroxine 100 mcg tablet Commonly known as:  SYNTHROID  
120 mcg daily. meclizine 25 mg tablet Commonly known as:  ANTIVERT Take 1 Tab by mouth nightly. methotrexate 2.5 mg tablet Commonly known as:  Gwynda Maiers Take  by mouth every Sunday. 6 capsules Oxygen  
nightly. 2 liters [address] [place] [address] Take 2 Puffs by inhalation daily. verapamil  mg ER capsule Commonly known as:  Alejandre Reasons Take 1 Cap by mouth daily. VITAMIN D3 2,000 unit Tab Generic drug:  cholecalciferol (vitamin D3) Take  by mouth daily. Prescriptions Printed Refills  
 diazePAM (VALIUM) 10 mg tablet 3 Sig: Take 1 Tab by mouth every twelve (12) hours as needed for Anxiety. Max Daily Amount: 20 mg.  
 Class: Print Route: Oral  
  
Prescriptions Sent to Pharmacy Refills  
 meclizine (ANTIVERT) 25 mg tablet 3 Sig: Take 1 Tab by mouth nightly. Class: Normal  
 Pharmacy: [facility] [address] ROSALBA  [facility], [address] Ph #: [phone] Route: Oral  
 verapamil ER (VERELAN) 120 mg ER capsule 3 Sig: Take 1 Cap by mouth daily. Class: Normal  
 Pharmacy: [address] [facility], [address] [place] [address] #: [phone] Route: Oral  
  
Follow-up Instructions Return in about 3 months (around 8/14/2018). To-Do List   
 05/14/2018 Imaging:  MRI BRAIN W WO CONT Introducing [facility] SERVICES! Luca Osei introduces [employer] patient portal. Now you can access parts of your medical record, email your doctor's office, and request medication refills online. 1. In your internet browser, go to [email or web address] 2. Click on the First Time User? Click Here link in the Sign In box. You will see the New Member Sign Up page. 3. Enter your [employer] Access Code exactly as it appears below. You will not need to use this code after youve completed the sign-up process. If you do not sign up before the expiration date, you must request a new code. · [employer] Access Code: [email or web address] Expires: 8/12/2018 10:23 AM 
 
4. Enter the last four digits of your Social Security Number (xxxx) and Date of Birth (mm/dd/yyyy) as indicated and click Submit. You will be taken to the next sign-up page. 5. Create a [employer] ID. This will be your [employer] login ID and cannot be changed, so think of one that is secure and easy to remember. 6. Create a [employer] password. You can change your password at any time. 7. Enter your Password Reset Question and Answer. This can be used at a later time if you forget your password. 8. Enter your e-mail address. You will receive e-mail notification when new information is available in [address] 9. Click Sign Up. You can now view and download portions of your medical record. 10. Click the Download Summary menu link to download a portable copy of your medical information.  
 
If you have questions, please visit the Frequently Asked Questions section of the [employer]. Remember, [employer] is NOT to be used for urgent needs. For medical emergencies, dial 911. Now available from your iPhone and Android! Please provide this summary of care documentation to your next provider. Your primary care clinician is listed as Rajni Matthews. If you have any questions after today's visit, please call [phone].

## 2018-05-16 ENCOUNTER — HOSPITAL ENCOUNTER (OUTPATIENT)
Dept: MRI IMAGING | Age: 78
Discharge: HOME | End: 2018-05-16
Attending: PSYCHIATRY & NEUROLOGY
Payer: MEDICARE

## 2018-05-16 DIAGNOSIS — G43.909 MIGRAINE WITHOUT STATUS MIGRAINOSUS, NOT INTRACTABLE, UNSPECIFIED MIGRAINE TYPE: ICD-10-CM

## 2018-05-16 DIAGNOSIS — R20.2 PARESTHESIA: ICD-10-CM

## 2018-05-16 DIAGNOSIS — R42 DIZZINESS: ICD-10-CM

## 2018-05-16 PROCEDURE — 74011250636 HC RX REV CODE- 250/636: Performed by: PSYCHIATRY & NEUROLOGY

## 2018-05-16 PROCEDURE — 70553 MRI BRAIN STEM W/O & W/DYE: CPT

## 2018-05-16 PROCEDURE — A9575 INJ GADOTERATE MEGLUMI 0.1ML: HCPCS | Performed by: PSYCHIATRY & NEUROLOGY

## 2018-05-16 RX ORDER — GADOTERATE MEGLUMINE 376.9 MG/ML
20 INJECTION INTRAVENOUS
Status: COMPLETED | OUTPATIENT
Start: 2018-05-16 | End: 2018-05-16

## 2018-05-16 RX ADMIN — GADOTERATE MEGLUMINE 19 ML: 376.9 INJECTION INTRAVENOUS at 11:48

## 2018-07-16 ENCOUNTER — OFFICE VISIT (OUTPATIENT)
Dept: NEUROLOGY | Age: 78
End: 2018-07-16

## 2018-07-16 VITALS
BODY MASS INDEX: 39.91 KG/M2 | WEIGHT: 211.4 LBS | TEMPERATURE: 98.4 F | OXYGEN SATURATION: 95 % | HEART RATE: 70 BPM | RESPIRATION RATE: 16 BRPM | HEIGHT: 61 IN | SYSTOLIC BLOOD PRESSURE: 122 MMHG | DIASTOLIC BLOOD PRESSURE: 69 MMHG

## 2018-07-16 DIAGNOSIS — G50.8 TRIGEMINAL NEURITIS: ICD-10-CM

## 2018-07-16 DIAGNOSIS — F41.9 ANXIETY: ICD-10-CM

## 2018-07-16 DIAGNOSIS — G43.909 MIGRAINE WITHOUT STATUS MIGRAINOSUS, NOT INTRACTABLE, UNSPECIFIED MIGRAINE TYPE: ICD-10-CM

## 2018-07-16 DIAGNOSIS — B02.29 PHN (POSTHERPETIC NEURALGIA): Primary | ICD-10-CM

## 2018-07-16 DIAGNOSIS — R42 DIZZINESS: ICD-10-CM

## 2018-07-16 DIAGNOSIS — F41.1 GAD (GENERALIZED ANXIETY DISORDER): ICD-10-CM

## 2018-07-16 RX ORDER — VERAPAMIL HYDROCHLORIDE 120 MG/1
120 CAPSULE, EXTENDED RELEASE ORAL DAILY
Qty: 30 CAP | Refills: 6 | Status: SHIPPED | OUTPATIENT
Start: 2018-07-16 | End: 2018-09-11 | Stop reason: SDUPTHER

## 2018-07-16 RX ORDER — PREGABALIN 75 MG/1
75 CAPSULE ORAL 2 TIMES DAILY
Qty: 60 CAP | Refills: 1 | Status: SHIPPED | OUTPATIENT
Start: 2018-07-16 | End: 2018-10-05 | Stop reason: SDUPTHER

## 2018-07-16 RX ORDER — PREDNISONE 10 MG/1
10 TABLET ORAL 2 TIMES DAILY
Qty: 30 TAB | Refills: 0 | Status: SHIPPED | OUTPATIENT
Start: 2018-07-16 | End: 2020-01-13 | Stop reason: SDUPTHER

## 2018-07-16 RX ORDER — VALACYCLOVIR HYDROCHLORIDE 1 G/1
1000 TABLET, FILM COATED ORAL DAILY
Qty: 30 TAB | Refills: 1 | Status: SHIPPED | OUTPATIENT
Start: 2018-07-16 | End: 2022-01-31

## 2018-07-16 RX ORDER — DIAZEPAM 10 MG/1
10 TABLET ORAL
Qty: 60 TAB | Refills: 3 | Status: SHIPPED | OUTPATIENT
Start: 2018-07-16 | End: 2018-09-11 | Stop reason: SDUPTHER

## 2018-07-16 NOTE — PROGRESS NOTES
Neurology Progress Note    NAME:  Hermelindo Florentino   :   1940   MRN:   [id number]     Date/Time:  2018  Subjective:   Patient returns today for follow-up test results. She says she still having left sided facial numbness, with occasional pain , there is no aggravating factor, pain is sharp in nature, intermittent, wakes patient up from sleep. On a scale of 1 to 10, patient rates the pain level to be about 6. Patient says she is also experiencing neck pain, neck pain is continuous, sharp in nature, burning, goes down to the arms causing numbness and tingling sensation and up to the head causing headaches. She says the headache started in the middle and on top of the head, sometimes is sharp and brief, other times it is achy and nagging in nature. Says he still experiencing dizziness, mostly when changing positions, however she has not had any blackout spells. She denies loss of consciousness, dysphagia, odynophagia. MRI of the brain reviewed with patient is unremarkable.   Review of Systems - General ROS: positive for  - fatigue, night sweats and sleep disturbance  Psychological ROS: positive for - anxiety and sleep disturbances  Ophthalmic ROS: positive for - blurry vision, decreased vision and photophobia  ENT ROS: positive for - headaches, tinnitus, vertigo and visual changes  Allergy and Immunology ROS: negative  Hematological and Lymphatic ROS: negative  Endocrine ROS: negative  Respiratory ROS: no cough, shortness of breath, or wheezing  Cardiovascular ROS: no chest pain or dyspnea on exertion  Gastrointestinal ROS: no abdominal pain, change in bowel habits, or black or bloody stools  Genito-Urinary ROS: no dysuria, trouble voiding, or hematuria  Musculoskeletal ROS: positive for - joint pain, joint stiffness, joint swelling, muscle pain and muscular weakness  Neurological ROS: positive for - dizziness, gait disturbance, headaches, impaired coordination/balance, numbness/tingling, visual changes and weakness  Dermatological ROS: negative      Medications reviewed:  Current Outpatient Prescriptions   Medication Sig Dispense Refill    meclizine (ANTIVERT) 25 mg tablet Take 1 Tab by mouth nightly. 60 Tab 3    diazePAM (VALIUM) 10 mg tablet Take 1 Tab by mouth every twelve (12) hours as needed for Anxiety. Max Daily Amount: 20 mg. 30 Tab 3    verapamil ER (VERELAN) 120 mg ER capsule Take 1 Cap by mouth daily. 30 Cap 3    levothyroxine (SYNTHROID) 100 mcg tablet 120 mcg daily. 0    cholecalciferol, vitamin D3, (VITAMIN D3) 2,000 unit tab Take  by mouth daily.  TIOTROPIUM BROMIDE (SPIRIVA WITH HANDIHALER IN) Take 2 Puffs by inhalation daily.  aspirin delayed-release 81 mg tablet Take  by mouth daily.  hydroxychloroquine (PLAQUENIL) 200 mg tablet Take 200 mg by mouth two (2) times a day.  methotrexate (RHEUMATREX) 2.5 mg tablet Take  by mouth every Sunday. 6 capsules      folic acid (FOLVITE) 1 mg tablet Take  by mouth daily.  Oxygen nightly. 2 liters      predniSONE (DELTASONE) 5 mg tablet Take 1 Tab by mouth two (2) times a day. 30 Tab 0        Objective:   Vitals:  Vitals:    07/16/18 0930   BP: 122/69   Pulse: 70   Resp: 16   Temp: 98.4 °F (36.9 °C)   TempSrc: Temporal   SpO2: 95%   Weight: 211 lb 6.4 oz (95.9 kg)   Height: 5' 1\" (1.549 m)   PainSc:   6   PainLoc: Mouth       Lab Data Reviewed:  No results found for: WBC, HCT, HGB, PLT, HCTEXT, HGBEXT, PLTEXT    No results found for: NA, K, CL, CO2, GLU, BUN, CREA, CA    No components found for: TROPQUANT    No results found for: PAMELA      No results found for: HBA1C, HGBE8, VAC0ZJMS, [id number]     No results found for: B12LT, FOL, RBCF    No results found for: PAMELA, ANARX, ANAIGG, XBANA    No results found for: CHOL, CHOLPOCT, CHOLX, CHLST, CHOLV, HDL, HDLPOC, LDL, LDLCPOC, LDLC, DLDLP, VLDLC, VLDL, TGLX, TRIGL, TRIGP, TGLPOCT, CHHD, CHHDX      CT Results (recent):  No results found for this or any previous visit.     MRI Results (recent):    Results from [place] encounter on 05/16/18   MRI BRAIN W WO CONT   Narrative CLINICAL HISTORY: Dizziness, paresthesias and headaches    INDICATION: Dizziness, paresthesias and headache. COMPARISON: 4/19/2017    TECHNIQUE: MR examination of the brain includes axial and sagittal T1  pre-and-post contrast, axial T2, axial FLAIR, axial gradient echo, axial DWI,  coronal T1 postcontrast.  CONTRAST: The patient was then administered gadolinium-based contrast material  axial and coronal T1-weighted postcontrast enhanced imaging was obtained. FINDINGS:   There is no intracranial mass, hemorrhage or evidence of acute infarction. Intraosseous lesion right parietal bone is stable compared to the prior study. Minimal periventricular minimal scattered foci of increased T2 signal density in  the cerebral white matter. Trace fluid in the inferior mastoid air cell on the  left. There is no Chiari or syrinx. The pituitary and infundibulum are grossly  unremarkable. There is no skull base mass. Cerebellopontine angles are grossly  unremarkable. The major intracranial vascular flow-voids are unremarkable. There is no abnormal parenchymal enhancement. There is no abnormal meningeal  enhancement. The cavernous sinuses are symmetric. Optic chiasm and infundibulum  grossly unremarkable. Orbits are grossly symmetric. Dural venous sinuses are  grossly patent. The brain architecture is normal. There is no evidence of  midline shift or mass-effect. There are no extra-axial fluid collections. The mastoid air cells are well pneumatized and clear. Impression IMPRESSION:  Stable examination. Minimal chronic microvascular ischemic change. No intracranial mass, hemorrhage  or evidence of acute infarction. IR Results (recent):  No results found for this or any previous visit. VAS/US Results (recent):  No results found for this or any previous visit.     PHYSICAL EXAM:  General:    Alert, cooperative, no distress, appears stated age. Head:   Normocephalic, without obvious abnormality, atraumatic. Eyes:   Conjunctivae/corneas clear. PERRLA  Nose:  Nares normal. No drainage or sinus tenderness. Throat:    Lips, mucosa, and tongue normal.  No Thrush  Neck:  Supple, symmetrical,  no adenopathy, thyroid: non tender    no carotid bruit and no JVD. Paraspinal tenderness  Back:    Symmetric,  No CVA tenderness. Lungs:   Clear to auscultation bilaterally. No Wheezing or Rhonchi. No rales. Chest wall:  No tenderness or deformity. No Accessory muscle use. Heart:   Regular rate and rhythm,  no murmur, rub or gallop. Abdomen:   Soft, non-tender. Not distended. Bowel sounds normal. No masses  Extremities: Extremities normal, atraumatic, No cyanosis. No edema. No clubbing  Skin:     Texture, turgor normal. No rashes or lesions. Not Jaundiced  Lymph nodes: Cervical, supraclavicular normal.  Psych:  Good insight. Not depressed. Anxious . NEUROLOGICAL EXAM:  Appearance: The patient is well developed, well nourished, provides a coherent history and is in no acute distress. Mental Status: Oriented to time, place and person. Mood and affect appropriate. Cranial Nerves:   Intact visual fields. Fundi are benign. GITA, EOM's full, no nystagmus, no ptosis. Facial sensation is normal. Corneal reflexes are intact. Facial movement is symmetric. Hearing is normal bilaterally. Palate is midline with normal sternocleidomastoid and trapezius muscles are normal. Tongue is midline. Motor:  5/5 strength in upper and lower proximal and distal muscles. Normal bulk and tone. No fasciculations. Reflexes:   Deep tendon reflexes 2+/4 and symmetrical.   Sensory:    Dysesthesia to touch, pinprick and vibration. Gait:  Normal gait. Tremor:   Mild tremor noted. Cerebellar:  No cerebellar signs present.    Neurovascular:  Normal heart sounds and regular rhythm, peripheral pulses intact, and no carotid bruits. Assesment  1. Migraine without status migrainosus, not intractable, unspecified migraine type    - verapamil ER (VERELAN) 120 mg ER capsule; Take 1 Cap by mouth daily. 2. Anxiety    - diazePAM (VALIUM) 10 mg tablet; Take 1 Tab by mouth every twelve (12) hours as needed for Anxiety. Max Daily Amount: 20 mg.    3. Dizziness    - diazePAM (VALIUM) 10 mg tablet; Take 1 Tab by mouth every twelve (12) hours as needed for Anxiety. Max Daily Amount: 20 mg.    4. PHN (postherpetic neuralgia)  Continue management    5. Trigeminal neuritis  Continue management     ___________________________________________________  PLAN: Medication reviewed with patient      ICD-10-CM ICD-9-CM    1. PHN (postherpetic neuralgia) B02.29 053.19    2. Migraine without status migrainosus, not intractable, unspecified migraine type G43.909 346.90 verapamil ER (VERELAN) 120 mg ER capsule   3. Anxiety F41.9 300.00 diazePAM (VALIUM) 10 mg tablet   4. Dizziness R42 780.4 diazePAM (VALIUM) 10 mg tablet   5. Trigeminal neuritis G50.8 350.1      Follow-up Disposition:  Return in about 2 months (around 9/16/2018).            ___________________________________________________    Total time spent with patient:  []15   []25   []35   [] __ minutes    Care Plan discussed with:    [x]Patient   []Family    []Care Manager   []Consultant/Specialist :    ___________________________________________________    Attending Physician: Africa Jefferson MD

## 2018-07-16 NOTE — PATIENT INSTRUCTIONS
Trigeminal Neuralgia: Care Instructions Your Care Instructions Trigeminal neuralgia is a problem with the large nerve that brings feeling to your face. It causes a sudden, sharp pain on one side of your face. Just touching your cheek or talking can set off shooting pain toward the ear, eye, or nostril. Living with this pain can be very hard. Some people have long periods when they do not have pain, and then it comes back. Some people have periods of pain often. But medicine or other treatment often can make the pain go away. If you keep having pain, surgery may help. This problem is also called tic douloureux (say \"tik doo-susi-AGUILAR\"). Follow-up care is a key part of your treatment and safety. Be sure to make and go to all appointments, and call your doctor if you are having problems. It's also a good idea to know your test results and keep a list of the medicines you take. How can you care for yourself at home? · Write down when you have pain and what you were doing when it started. Try to find what causes the pain. Being in a cold wind, yawning, or shaving are examples. Avoid or limit these triggers if you can. · Be safe with medicines. Take your medicines exactly as prescribed. ¨ Your doctor may have prescribed medicines used to treat depression and seizures. They can reduce your pain, help you sleep better, and improve your mood. ¨ Call your doctor if you think you are having a problem with your medicine. You will get more details on the specific medicines your doctor prescribes. ¨ If you are not taking a prescription pain medicine, take an over-the-counter medicine such as acetaminophen (Tylenol), ibuprofen (Advil, Motrin), or naproxen (Aleve). Read and follow all instructions on the label. ¨ Do not take two or more pain medicines at the same time unless the doctor told you to. Many pain medicines have acetaminophen, which is Tylenol. Too much acetaminophen (Tylenol) can be harmful.  
· Reduce stress in your life. Ask your doctor about ways to relax. These may include breathing exercises and massage. · Think about joining a support group with other people who have this problem. These groups can give comfort and information about what to do to feel better. Your doctor can tell you how to find a support group. When should you call for help? Call your doctor now or seek immediate medical care if: 
  · You have severe pain that you can't control.  
 Watch closely for changes in your health, and be sure to contact your doctor if: 
  · You are not able to sleep because of the pain.  
  · You do not get better as expected. Where can you learn more? Go to http://jovan-catie.info/. Enter R378 in the search box to learn more about \"Trigeminal Neuralgia: Care Instructions. \" Current as of: November 20, 2017 Content Version: 11.7 © 8086-3296 [a]list games. Care instructions adapted under license by [employer] (which disclaims liability or warranty for this information). If you have questions about a medical condition or this instruction, always ask your healthcare professional. [address] any warranty or liability for your use of this information.

## 2018-07-16 NOTE — MR AVS SNAPSHOT
[address] [place] [address] [address] 
[phone] Patient: Janna Curtis MRN: [id number] GMU:[birth year] Visit Information Date & Time Provider Department Dept. Phone Encounter #  
 7/16/2018  9:40 AM Gavin Agrawal MD [facility] [phone] [id number] Follow-up Instructions Return in about 2 months (around 9/16/2018). Upcoming Health Maintenance Date Due DTaP/Tdap/Td series (1 - Tdap) 3/4/1963 ZOSTER VACCINE AGE 60> 1/4/2002 GLAUCOMA SCREENING Q2Y 3/4/2007 Bone Densitometry (Dexa) Screening 3/4/2007 Pneumococcal 65+ Low/Medium Risk (1 of 2 - PCV13) 3/4/2007 MEDICARE YEARLY EXAM 3/14/2018 Influenza Age 5 to Adult 8/1/2018 Allergies as of 7/16/2018  Review Complete On: 7/16/2018 By: Tiffanie Arroyo LPN Severity Noted Reaction Type Reactions Codeine  04/10/2017    Swelling Erythromycin  04/10/2017    Itching And swelling Lovastatin  04/10/2017    Swelling Minocin [Minocycline]  04/10/2017    Swelling Naproxen  08/09/2017    Drowsiness Neurontin [Gabapentin]  04/10/2017    Swelling Pcn [Penicillins]  04/10/2017    Swelling Sulfa (Sulfonamide Antibiotics)  04/10/2017    Itching Tramadol  08/09/2017    Drowsiness Current Immunizations  Never Reviewed No immunizations on file. Not reviewed this visit You Were Diagnosed With   
  
 Codes Comments PHN (postherpetic neuralgia)    -  Primary ICD-10-CM: B02.29 ICD-9-CM: 053.19 Migraine without status migrainosus, not intractable, unspecified migraine type     ICD-10-CM: G43.909 ICD-9-CM: 346.90 Anxiety     ICD-10-CM: F41.9 ICD-9-CM: 300.00 Dizziness     ICD-10-CM: C81 ICD-9-CM: 780.4 Trigeminal neuritis     ICD-10-CM: G50.8 ICD-9-CM: 350.1 POORNIMA (generalized anxiety disorder)     ICD-10-CM: F41.1 ICD-9-CM: 300.02 Vitals BP Pulse Temp Resp Height(growth percentile) 122/69 (BP 1 Location: Right arm, BP Patient Position: Sitting) 70 98.4 °F (36.9 °C) (Temporal) 16 5' 1\" (1.549 m) Weight(growth percentile) SpO2 BMI OB Status Smoking Status 211 lb 6.4 oz (95.9 kg) 95% 39.94 kg/m2 Menopause Former Smoker BMI and BSA Data Body Mass Index Body Surface Area  
 39.94 kg/m 2 2.03 m 2 Preferred Pharmacy Pharmacy Name Phone [facility], [address] [phone] Your Updated Medication List  
  
   
This list is accurate as of 7/16/18 10:02 AM.  Always use your most recent med list.  
  
  
  
  
 aspirin delayed-release 81 mg tablet Take  by mouth daily. diazePAM 10 mg tablet Commonly known as:  VALIUM Take 1 Tab by mouth every twelve (12) hours as needed for Anxiety. Max Daily Amount: 20 mg.  
  
 folic acid 1 mg tablet Commonly known as:  Google Take  by mouth daily. hydroxychloroquine 200 mg tablet Commonly known as:  PLAQUENIL Take 200 mg by mouth two (2) times a day. levothyroxine 100 mcg tablet Commonly known as:  SYNTHROID  
120 mcg daily. meclizine 25 mg tablet Commonly known as:  ANTIVERT Take 1 Tab by mouth nightly. methotrexate 2.5 mg tablet Commonly known as:  Rayfield Sides Take  by mouth every Sunday. 6 capsules Oxygen  
nightly. 2 liters  
  
 predniSONE 10 mg tablet Commonly known as:  Bergman Fossa Take 1 Tab by mouth two (2) times a day. pregabalin 75 mg capsule Commonly known as:  [place] Take 1 Cap by mouth two (2) times a day. Max Daily Amount: 150 mg. [address] [place] [address] Take 2 Puffs by inhalation daily. valACYclovir 1 gram tablet Commonly known as:  VALTREX Take 1 Tab by mouth daily. verapamil  mg ER capsule Commonly known as:  Nyoka Chant Take 1 Cap by mouth daily. VITAMIN D3 2,000 unit Tab Generic drug:  cholecalciferol (vitamin D3) Take  by mouth daily. Prescriptions Printed Refills  
 diazePAM (VALIUM) 10 mg tablet 3 Sig: Take 1 Tab by mouth every twelve (12) hours as needed for Anxiety. Max Daily Amount: 20 mg.  
 Class: Print Route: Oral  
 pregabalin (LYRICA) 75 mg capsule 1 Sig: Take 1 Cap by mouth two (2) times a day. Max Daily Amount: 150 mg.  
 Class: Print Route: Oral  
  
Prescriptions Sent to Pharmacy Refills  
 verapamil ER (VERELAN) 120 mg ER capsule 6 Sig: Take 1 Cap by mouth daily. Class: Normal  
 Pharmacy: [facility] [address] [facility] [address] [place] [address] Ph #: [phone] Route: Oral  
 predniSONE (DELTASONE) 10 mg tablet 0 Sig: Take 1 Tab by mouth two (2) times a day. Class: Normal  
 Pharmacy: [facility] [address] [facility] [address] [place] [address] Ph #: [phone] Route: Oral  
 valACYclovir (VALTREX) 1 gram tablet 1 Sig: Take 1 Tab by mouth daily. Class: Normal  
 Pharmacy: [facility] [address] [place] [address] Ph #: [phone] Route: Oral  
  
Follow-up Instructions Return in about 2 months (around 9/16/2018). [facility]! [facility] introduces Woopie patient portal. Now you can access parts of your medical record, email your doctor's office, and request medication refills online. 1. In your internet browser, go to [email or web address] 2. Click on the First Time User? Click Here link in the Sign In box. You will see the New Member Sign Up page. 3. Enter your Woopie Access Code exactly as it appears below. You will not need to use this code after youve completed the sign-up process. If you do not sign up before the expiration date, you must request a new code. · Woopie Access Code: [email or web address] Expires: 8/12/2018 10:23 AM 
 
4.  Enter the last four digits of your Social Security Number (xxxx) and Date of Birth (mm/dd/yyyy) as indicated and click Submit. You will be taken to the next sign-up page. 5. Create a Smile ID. This will be your Smile login ID and cannot be changed, so think of one that is secure and easy to remember. 6. Create a Smile password. You can change your password at any time. 7. Enter your Password Reset Question and Answer. This can be used at a later time if you forget your password. 8. Enter your e-mail address. You will receive e-mail notification when new information is available in [address] 9. Click Sign Up. You can now view and download portions of your medical record. 10. Click the Download Summary menu link to download a portable copy of your medical information. If you have questions, please visit the Frequently Asked Questions section of the Smile website. Remember, Smile is NOT to be used for urgent needs. For medical emergencies, dial 911. Now available from your iPhone and Android! Please provide this summary of care documentation to your next provider. Your primary care clinician is listed as Apollo Seo. If you have any questions after today's visit, please call [phone].

## 2018-07-16 NOTE — PROGRESS NOTES
Chief Complaint   Patient presents with    Migraine    Numbness     tingling in the lips    Results     MRI       1. Have you been to the ER, urgent care clinic since your last visit? Hospitalized since your last visit? No    2. Have you seen or consulted any other health care providers outside of the [address] [place] [address] since your last visit? Include any pap smears or colon screening.  No    Pill count not performed patient did not bring medications        Pill count not verified with patient  Patient reports taking medication    UDS obtained 3/16/18  Pain contract on file   not  available   Script given for Lyrica and Valium

## 2018-09-11 ENCOUNTER — OFFICE VISIT (OUTPATIENT)
Dept: NEUROLOGY | Age: 78
End: 2018-09-11

## 2018-09-11 ENCOUNTER — HOSPITAL ENCOUNTER (OUTPATIENT)
Dept: GENERAL RADIOLOGY | Age: 78
Discharge: HOME | End: 2018-09-11
Payer: MEDICARE

## 2018-09-11 VITALS
BODY MASS INDEX: 41.57 KG/M2 | WEIGHT: 220.2 LBS | HEART RATE: 71 BPM | OXYGEN SATURATION: 93 % | SYSTOLIC BLOOD PRESSURE: 118 MMHG | DIASTOLIC BLOOD PRESSURE: 70 MMHG | HEIGHT: 61 IN | TEMPERATURE: 97.9 F | RESPIRATION RATE: 18 BRPM

## 2018-09-11 DIAGNOSIS — G43.909 MIGRAINE WITHOUT STATUS MIGRAINOSUS, NOT INTRACTABLE, UNSPECIFIED MIGRAINE TYPE: ICD-10-CM

## 2018-09-11 DIAGNOSIS — M54.50 ACUTE BILATERAL LOW BACK PAIN WITHOUT SCIATICA: ICD-10-CM

## 2018-09-11 DIAGNOSIS — F41.9 ANXIETY: ICD-10-CM

## 2018-09-11 DIAGNOSIS — R42 DIZZINESS: ICD-10-CM

## 2018-09-11 DIAGNOSIS — M54.16 LUMBAR RADICULOPATHY: ICD-10-CM

## 2018-09-11 DIAGNOSIS — R20.2 PARESTHESIA: Primary | ICD-10-CM

## 2018-09-11 PROCEDURE — 72100 X-RAY EXAM L-S SPINE 2/3 VWS: CPT

## 2018-09-11 RX ORDER — NORTRIPTYLINE HYDROCHLORIDE 10 MG/1
10 CAPSULE ORAL
Qty: 30 CAP | Refills: 1 | Status: SHIPPED | OUTPATIENT
Start: 2018-09-11 | End: 2022-10-26

## 2018-09-11 RX ORDER — DIAZEPAM 10 MG/1
10 TABLET ORAL
Qty: 60 TAB | Refills: 3 | Status: SHIPPED | OUTPATIENT
Start: 2018-09-11 | End: 2019-02-13

## 2018-09-11 RX ORDER — VERAPAMIL HYDROCHLORIDE 120 MG/1
120 CAPSULE, EXTENDED RELEASE ORAL DAILY
Qty: 30 CAP | Refills: 6 | Status: SHIPPED | OUTPATIENT
Start: 2018-09-11 | End: 2019-02-13 | Stop reason: SDUPTHER

## 2018-09-11 RX ORDER — HYDROCODONE BITARTRATE AND ACETAMINOPHEN 7.5; 325 MG/1; MG/1
1 TABLET ORAL
Qty: 15 TAB | Refills: 0 | Status: SHIPPED | OUTPATIENT
Start: 2018-09-11 | End: 2022-01-31

## 2018-09-11 NOTE — PROGRESS NOTES
Neurology Progress Note    NAME:  Manuel Hernandez   :   1940   MRN:   [id number]     Date/Time:  2018  Subjective: Manuel Hernandez is a 70 y.o. female here today for follow-up for headache,back pain, fatigue and leg swelling. Patient today says she suddenly developed sharp pain in the back, pain goes down to the legs, pain is burning in nature causing numbness and tingling sensation of the legs. She cannot remember having any falls, pain wakes patient up at night and certain movements makes the pain worse. She says she also noted that her legs have been swollen, walking is painful and she feels heaviness of the legs and feet. Says she is always very tired, she can barely get out of the bed in the mornings, all of these started newly. Headache waxes and wanes, his frequency is variable, headache is throbbing in nature mostly frontal, occasional sharp pain in the back of the head. Headache associated with nausea, blurry vision, photophobia, phonophobia, photopsia. She says she has a dizziness with or without headaches. Denies loss of consciousness, dysphagia or odynophagia. Due to the new onset low back pain, I will obtain x-ray of the lumbosacral spine and send patient to physical therapy. Depending on the outcome of physical therapy and x-ray I will obtain MRI of the lumbosacral spine to evaluate for radiculopathy. I will also obtain blood for  B12, TSH, CK, aldolase, vitamin D, ACE titer. I will also consider EMG nerve conduction studies of the lower extremities.   Review of Systems - General ROS: positive for  - fatigue, fever, hot flashes, malaise, night sweats, sleep disturbance and weight gain  Psychological ROS: positive for - anxiety, concentration difficulties, depression and sleep disturbances  Ophthalmic ROS: positive for - blurry vision, decreased vision, double vision and photophobia  ENT ROS: positive for - headaches, tinnitus, vertigo and visual changes  Allergy and Immunology ROS: negative  Hematological and Lymphatic ROS: negative  Endocrine ROS: negative  Respiratory ROS: no cough, shortness of breath, or wheezing  Cardiovascular ROS: no chest pain or dyspnea on exertion  Gastrointestinal ROS: no abdominal pain, change in bowel habits, or black or bloody stools  Genito-Urinary ROS: no dysuria, trouble voiding, or hematuria  Musculoskeletal ROS: positive for - gait disturbance, joint pain, joint stiffness, joint swelling, muscle pain and muscular weakness  Neurological ROS: positive for - dizziness, gait disturbance, headaches, impaired coordination/balance, numbness/tingling, tremors, visual changes and weakness  Dermatological ROS: negative    Medications reviewed:  Current Outpatient Prescriptions   Medication Sig Dispense Refill    verapamil ER (VERELAN) 120 mg ER capsule Take 1 Cap by mouth daily. 30 Cap 6    diazePAM (VALIUM) 10 mg tablet Take 1 Tab by mouth every twelve (12) hours as needed for Anxiety. Max Daily Amount: 20 mg. 60 Tab 3    valACYclovir (VALTREX) 1 gram tablet Take 1 Tab by mouth daily. 30 Tab 1    pregabalin (LYRICA) 75 mg capsule Take 1 Cap by mouth two (2) times a day. Max Daily Amount: 150 mg. 60 Cap 1    meclizine (ANTIVERT) 25 mg tablet Take 1 Tab by mouth nightly. 60 Tab 3    levothyroxine (SYNTHROID) 100 mcg tablet 120 mcg daily. 0    cholecalciferol, vitamin D3, (VITAMIN D3) 2,000 unit tab Take  by mouth daily.  TIOTROPIUM BROMIDE (SPIRIVA WITH HANDIHALER IN) Take 2 Puffs by inhalation daily.  aspirin delayed-release 81 mg tablet Take  by mouth daily.  hydroxychloroquine (PLAQUENIL) 200 mg tablet Take 200 mg by mouth two (2) times a day.  methotrexate (RHEUMATREX) 2.5 mg tablet Take  by mouth every Sunday. 6 capsules      folic acid (FOLVITE) 1 mg tablet Take  by mouth daily.  Oxygen nightly. 2 liters      predniSONE (DELTASONE) 10 mg tablet Take 1 Tab by mouth two (2) times a day.  30 Tab 0        Objective: Vitals:  Vitals:    09/11/18 0939   BP: 118/70   Pulse: 71   Resp: 18   Temp: 97.9 °F (36.6 °C)   TempSrc: Temporal   SpO2: 93%   Weight: 220 lb 3.2 oz (99.9 kg)   Height: 5' 1\" (1.549 m)   PainSc:   0 - No pain               Lab Data Reviewed:  No results found for: WBC, HCT, HGB, PLT, HCTEXT, HGBEXT, PLTEXT    No results found for: NA, K, CL, CO2, GLU, BUN, CREA, CA    No components found for: TROPQUANT    No results found for: PAMELA      No results found for: HBA1C, HGBE8, FUU6IBHT, EOU2ZGLE     No results found for: B12LT, FOL, RBCF    No results found for: PAMELA, ANARX, ANAIGG, XBANA    No results found for: CHOL, CHOLPOCT, CHOLX, CHLST, CHOLV, HDL, HDLPOC, LDL, LDLCPOC, LDLC, DLDLP, VLDLC, VLDL, TGLX, TRIGL, TRIGP, TGLPOCT, CHHD, CHHDX      CT Results (recent):  No results found for this or any previous visit. MRI Results (recent):    Results from [place] encounter on 05/16/18   MRI BRAIN W WO CONT   Narrative CLINICAL HISTORY: Dizziness, paresthesias and headaches    INDICATION: Dizziness, paresthesias and headache. COMPARISON: 4/19/2017    TECHNIQUE: MR examination of the brain includes axial and sagittal T1  pre-and-post contrast, axial T2, axial FLAIR, axial gradient echo, axial DWI,  coronal T1 postcontrast.  CONTRAST: The patient was then administered gadolinium-based contrast material  axial and coronal T1-weighted postcontrast enhanced imaging was obtained. FINDINGS:   There is no intracranial mass, hemorrhage or evidence of acute infarction. Intraosseous lesion right parietal bone is stable compared to the prior study. Minimal periventricular minimal scattered foci of increased T2 signal density in  the cerebral white matter. Trace fluid in the inferior mastoid air cell on the  left. There is no Chiari or syrinx. The pituitary and infundibulum are grossly  unremarkable. There is no skull base mass. Cerebellopontine angles are grossly  unremarkable.  The major intracranial vascular flow-voids are unremarkable. There is no abnormal parenchymal enhancement. There is no abnormal meningeal  enhancement. The cavernous sinuses are symmetric. Optic chiasm and infundibulum  grossly unremarkable. Orbits are grossly symmetric. Dural venous sinuses are  grossly patent. The brain architecture is normal. There is no evidence of  midline shift or mass-effect. There are no extra-axial fluid collections. The mastoid air cells are well pneumatized and clear. Impression IMPRESSION:  Stable examination. Minimal chronic microvascular ischemic change. No intracranial mass, hemorrhage  or evidence of acute infarction. IR Results (recent):  No results found for this or any previous visit. VAS/US Results (recent):  No results found for this or any previous visit. PHYSICAL EXAM:  General:    Alert, cooperative, no distress, appears stated age. Head:   Normocephalic, without obvious abnormality, atraumatic. Eyes:   Conjunctivae/corneas clear. PERRLA  Nose:  Nares normal. No drainage or sinus tenderness. Throat:    Lips, mucosa, and tongue normal.  No Thrush  Neck:  Supple, symmetrical,  no adenopathy, thyroid: non tender    no carotid bruit and no JVD. Paraspinal tenderness  Back:    Symmetric, bilateral tenderness. Lungs:   Clear to auscultation bilaterally. No Wheezing or Rhonchi. No rales. Chest wall:  No tenderness or deformity. No Accessory muscle use. Heart:   Regular rate and rhythm,  no murmur, rub or gallop. Abdomen:   Soft, non-tender. Not distended. Bowel sounds normal. No masses  Extremities: Extremities normal, atraumatic, No cyanosis. No edema. No clubbing  Skin:     Texture, turgor normal. No rashes or lesions. Not Jaundiced  Lymph nodes: Cervical, supraclavicular normal.  Psych:  Good insight. Not depressed. Anxious . NEUROLOGICAL EXAM:  Appearance:   The patient is well developed, well nourished, provides a coherent history and is in no acute distress. Mental Status: Oriented to time, place and person. Mood and affect appropriate. Cranial Nerves:   Intact visual fields. Fundi are benign. GITA, EOM's full, no nystagmus, no ptosis. Facial sensation is normal. Corneal reflexes are intact. Facial movement is symmetric. Hearing is normal bilaterally. Palate is midline with normal sternocleidomastoid and trapezius muscles are normal. Tongue is midline. Motor:  5/5 strength in upper and 5-/5 lower proximal and distal muscles. Normal bulk and tone. No fasciculations. Reflexes:   Deep tendon reflexes 2+/4 and symmetrical.   Sensory:    Dysesthesia to touch, pinprick and vibration. Gait:   Unsteady gait. Tremor:    Mild tremor noted. Cerebellar:  No cerebellar signs present. Neurovascular:  Normal heart sounds and regular rhythm, peripheral pulses intact, and no carotid bruits. Assesment  1. Anxiety    - diazePAM (VALIUM) 10 mg tablet; Take 1 Tab by mouth every twelve (12) hours as needed for Anxiety. Max Daily Amount: 20 mg. Dispense: 60 Tab; Refill: 3    2. Dizziness    - diazePAM (VALIUM) 10 mg tablet; Take 1 Tab by mouth every twelve (12) hours as needed for Anxiety. Max Daily Amount: 20 mg. Dispense: 60 Tab; Refill: 3    3. Migraine without status migrainosus, not intractable, unspecified migraine type    - verapamil ER (VERELAN) 120 mg ER capsule; Take 1 Cap by mouth daily. Dispense: 30 Cap; Refill: 6    4. Paresthesia    Stable  ___________________________________________________  PLAN: Medication and plan reviewed with patient      ICD-10-CM ICD-9-CM    1. Paresthesia R20.2 782.0    2. Anxiety F41.9 300.00 diazePAM (VALIUM) 10 mg tablet   3. Dizziness R42 780.4 diazePAM (VALIUM) 10 mg tablet   4. Migraine without status migrainosus, not intractable, unspecified migraine type G43.909 346.90 verapamil ER (VERELAN) 120 mg ER capsule     Follow-up Disposition:  Return in about 3 months (around 12/11/2018). ___________________________________________________    Total time spent with patient:  []15   []25   []35   [] __ minutes    Care Plan discussed with:    [x]Patient   []Family    []Care Manager   []Consultant/Specialist :    ___________________________________________________    Attending Physician: Shy Castaneda MD

## 2018-09-11 NOTE — MR AVS SNAPSHOT
Shawn George 
 
 
 [facility] [address] [address] 
[phone] Patient: Armando Davis MRN: [id number]:[id number] Visit Information Date & Time Provider Department Dept. Phone Encounter #  
 9/11/2018  9:40 AM MD Yasmin [facility] [phone] [id number] Follow-up Instructions Return in about 3 weeks (around 10/2/2018). Upcoming Health Maintenance Date Due DTaP/Tdap/Td series (1 - Tdap) 3/4/1961 ZOSTER VACCINE AGE 60> 1/4/2002 GLAUCOMA SCREENING Q2Y 3/4/2007 Bone Densitometry (Dexa) Screening 3/4/2007 Pneumococcal 65+ Low/Medium Risk (1 of 2 - PCV13) 3/4/2007 MEDICARE YEARLY EXAM 3/14/2018 Influenza Age 5 to Adult 8/1/2018 Allergies as of 9/11/2018  Review Complete On: 9/11/2018 By: Roxana Saha MD  
  
 Severity Noted Reaction Type Reactions Codeine  04/10/2017    Swelling Erythromycin  04/10/2017    Itching And swelling Lovastatin  04/10/2017    Swelling Minocin [Minocycline]  04/10/2017    Swelling Naproxen  08/09/2017    Drowsiness Neurontin [Gabapentin]  04/10/2017    Swelling Pcn [Penicillins]  04/10/2017    Swelling Sulfa (Sulfonamide Antibiotics)  04/10/2017    Itching Tramadol  08/09/2017    Drowsiness Current Immunizations  Never Reviewed No immunizations on file. Not reviewed this visit You Were Diagnosed With   
  
 Codes Comments Paresthesia    -  Primary ICD-10-CM: R20.2 ICD-9-CM: 782.0 Anxiety     ICD-10-CM: F41.9 ICD-9-CM: 300.00 Dizziness     ICD-10-CM: F73 ICD-9-CM: 780.4 Migraine without status migrainosus, not intractable, unspecified migraine type     ICD-10-CM: G43.909 ICD-9-CM: 346.90 Acute bilateral low back pain without sciatica     ICD-10-CM: M54.5 ICD-9-CM: 724.2, 338.19 Lumbar radiculopathy     ICD-10-CM: M54.16 
ICD-9-CM: 724.4 Vitals BP Pulse Temp Resp Height(growth percentile) 118/70 (BP 1 Location: Left arm, BP Patient Position: Sitting) 71 97.9 °F (36.6 °C) (Temporal) 18 5' 1\" (1.549 m) Weight(growth percentile) SpO2 BMI OB Status Smoking Status 220 lb 3.2 oz (99.9 kg) 93% 41.61 kg/m2 Menopause Former Smoker BMI and BSA Data Body Mass Index Body Surface Area  
 41.61 kg/m 2 2.07 m 2 Preferred Pharmacy Pharmacy Name Phone [facility] [address] [phone] Your Updated Medication List  
  
   
This list is accurate as of 9/11/18 10:22 AM.  Always use your most recent med list.  
  
  
  
  
 aspirin delayed-release 81 mg tablet Take  by mouth daily. diazePAM 10 mg tablet Commonly known as:  VALIUM Take 1 Tab by mouth every twelve (12) hours as needed for Anxiety. Max Daily Amount: 20 mg.  
  
 folic acid 1 mg tablet Commonly known as:  Google Take  by mouth daily. HYDROcodone-acetaminophen 7.5-325 mg per tablet Commonly known as:  Nelma Fer Take 1 Tab by mouth every eight (8) hours as needed for Pain. Max Daily Amount: 3 Tabs. hydroxychloroquine 200 mg tablet Commonly known as:  PLAQUENIL Take 200 mg by mouth two (2) times a day. levothyroxine 100 mcg tablet Commonly known as:  SYNTHROID  
120 mcg daily. meclizine 25 mg tablet Commonly known as:  ANTIVERT Take 1 Tab by mouth nightly. methotrexate 2.5 mg tablet Commonly known as:  Katie Shaver Take  by mouth every Sunday. 6 capsules  
  
 nortriptyline 10 mg capsule Commonly known as:  PAMELOR Take 1 Cap by mouth nightly. Oxygen  
nightly. 2 liters  
  
 predniSONE 10 mg tablet Commonly known as:  Berle Pouch Take 1 Tab by mouth two (2) times a day. pregabalin 75 mg capsule Commonly known as:  Park Marvel Take 1 Cap by mouth two (2) times a day. Max Daily Amount: 150 mg. [address] [place], [place] [address]way Take 2 Puffs by inhalation daily. valACYclovir 1 gram tablet Commonly known as:  VALTREX Take 1 Tab by mouth daily. verapamil  mg ER capsule Commonly known as:  Colt Cirri Take 1 Cap by mouth daily. VITAMIN D3 2,000 unit Tab Generic drug:  cholecalciferol (vitamin D3) Take  by mouth daily. Prescriptions Printed Refills  
 diazePAM (VALIUM) 10 mg tablet 3 Sig: Take 1 Tab by mouth every twelve (12) hours as needed for Anxiety. Max Daily Amount: 20 mg.  
 Class: Print Route: Oral  
 HYDROcodone-acetaminophen (NORCO) 7.5-325 mg per tablet 0 Sig: Take 1 Tab by mouth every eight (8) hours as needed for Pain. Max Daily Amount: 3 Tabs. Class: Print Route: Oral  
  
Prescriptions Sent to Pharmacy Refills  
 verapamil ER (VERELAN) 120 mg ER capsule 6 Sig: Take 1 Cap by mouth daily. Class: Normal  
 Pharmacy: [facility] [address] [facility] [address] [place] [address] Ph #: [phone] Route: Oral  
 nortriptyline (PAMELOR) 10 mg capsule 1 Sig: Take 1 Cap by mouth nightly. Class: Normal  
 Pharmacy: [facility] [address] [facility] [address] [place] [address] Ph #: [phone] Route: Oral  
  
We Performed the Following ALDOLASE [id number] CPT(R)] CK [id number] CPT(R)] REFERRAL TO PHYSICAL THERAPY [XSU53 Custom] SED RATE (ESR) V8569854 CPT(R)] TSH 3RD GENERATION [05715 CPT(R)] VITAMIN B12 [id number] CPT(R)] VITAMIN D, 25 HYDROXY [id number] CPT(R)] Follow-up Instructions Return in about 3 weeks (around 10/2/2018). To-Do List   
 09/11/2018 Imaging:  XR SPINE LUMB 2 OR 3 V Referral Information Referral ID Referred By Referred To  
  
 [id number] Gina FRANCES Not Available Visits Status Start Date End Date 1 New Request 9/11/18 9/11/19 If your referral has a status of pending review or denied, additional information will be sent to support the outcome of this decision. Introducing [facility]! [employer] introduces [employer] patient portal. Now you can access parts of your medical record, email your doctor's office, and request medication refills online. 1. In your internet browser, go to [email or web address] 2. Click on the First Time User? Click Here link in the Sign In box. You will see the New Member Sign Up page. 3. Enter your [employer] Access Code exactly as it appears below. You will not need to use this code after youve completed the sign-up process. If you do not sign up before the expiration date, you must request a new code. · [employer] Access Code: [email or web address] Expires: 12/10/2018  9:59 AM 
 
4. Enter the last four digits of your Social Security Number (xxxx) and Date of Birth (mm/dd/yyyy) as indicated and click Submit. You will be taken to the next sign-up page. 5. Create a [employer] ID. This will be your [employer] login ID and cannot be changed, so think of one that is secure and easy to remember. 6. Create a [employer] password. You can change your password at any time. 7. Enter your Password Reset Question and Answer. This can be used at a later time if you forget your password. 8. Enter your e-mail address. You will receive e-mail notification when new information is available in [address] 9. Click Sign Up. You can now view and download portions of your medical record. 10. Click the Download Summary menu link to download a portable copy of your medical information. If you have questions, please visit the Frequently Asked Questions section of the [employer] website. Remember, [employer] is NOT to be used for urgent needs. For medical emergencies, dial 911. Now available from your iPhone and Android! Please provide this summary of care documentation to your next provider. Your primary care clinician is listed as Tino Gonzales. If you have any questions after today's visit, please call [phone].

## 2018-09-11 NOTE — PROGRESS NOTES
Chief Complaint   Patient presents with    Migraine    Other     postherpatic neuralgia    Medication Refill     1. Have you been to the ER, urgent care clinic since your last visit? Hospitalized since your last visit? no    2. Have you seen or consulted any other health care providers outside of the [address] [place] [address] since your last visit? Include any pap smears or colon screening. no      Patient states she have weakness in both bilateral lower extremities.      Pill count not performed patient did not bring medications       Pill count verified with patient  Patient reports taking medication  Q   UDS obtained 3/6/18  Pain contract on file   not available for review  Script given for Diazepam, Norco

## 2018-09-12 LAB
25(OH)D3+25(OH)D2 SERPL-MCNC: 37.7 NG/ML (ref 30–100)
ALDOLASE SERPL-CCNC: 6 U/L (ref 3.3–10.3)
CK SERPL-CCNC: 55 U/L (ref 24–173)
ERYTHROCYTE [SEDIMENTATION RATE] IN BLOOD BY WESTERGREN METHOD: 12 MM/HR (ref 0–40)
TSH SERPL DL<=0.005 MIU/L-ACNC: 1.58 UIU/ML (ref 0.45–4.5)
VIT B12 SERPL-MCNC: 426 PG/ML (ref 232–1245)

## 2018-09-24 ENCOUNTER — TELEPHONE (OUTPATIENT)
Dept: NEUROLOGY | Age: 78
End: 2018-09-24

## 2018-09-24 NOTE — TELEPHONE ENCOUNTER
Patient was curious if she had thyroid test tomorrow because if she goes to her PCP tomorrow he may order.   I told her when she gets there they can send us a release so we can send it to them

## 2018-10-05 ENCOUNTER — OFFICE VISIT (OUTPATIENT)
Dept: NEUROLOGY | Age: 78
End: 2018-10-05

## 2018-10-05 VITALS
DIASTOLIC BLOOD PRESSURE: 79 MMHG | SYSTOLIC BLOOD PRESSURE: 126 MMHG | HEIGHT: 61 IN | WEIGHT: 214.8 LBS | OXYGEN SATURATION: 96 % | BODY MASS INDEX: 40.55 KG/M2 | RESPIRATION RATE: 16 BRPM | HEART RATE: 74 BPM | TEMPERATURE: 98.2 F

## 2018-10-05 DIAGNOSIS — M54.50 ACUTE BILATERAL LOW BACK PAIN WITHOUT SCIATICA: ICD-10-CM

## 2018-10-05 DIAGNOSIS — G62.9 NEUROPATHY: ICD-10-CM

## 2018-10-05 DIAGNOSIS — M54.16 LUMBAR RADICULOPATHY: ICD-10-CM

## 2018-10-05 DIAGNOSIS — Z76.0 MEDICATION REFILL: Primary | ICD-10-CM

## 2018-10-05 DIAGNOSIS — R20.9 SENSORY DISORDER: ICD-10-CM

## 2018-10-05 DIAGNOSIS — F41.1 GAD (GENERALIZED ANXIETY DISORDER): ICD-10-CM

## 2018-10-05 DIAGNOSIS — M79.605 PAIN IN BOTH LOWER EXTREMITIES: ICD-10-CM

## 2018-10-05 DIAGNOSIS — B02.29 PHN (POSTHERPETIC NEURALGIA): ICD-10-CM

## 2018-10-05 DIAGNOSIS — G43.009 MIGRAINE WITHOUT AURA AND WITHOUT STATUS MIGRAINOSUS, NOT INTRACTABLE: ICD-10-CM

## 2018-10-05 DIAGNOSIS — G50.8 TRIGEMINAL NEURITIS: ICD-10-CM

## 2018-10-05 DIAGNOSIS — M79.604 PAIN IN BOTH LOWER EXTREMITIES: ICD-10-CM

## 2018-10-05 DIAGNOSIS — Z79.891 USE OF OPIATES FOR THERAPEUTIC PURPOSES: ICD-10-CM

## 2018-10-05 RX ORDER — SCOLOPAMINE TRANSDERMAL SYSTEM 1 MG/1
1 PATCH, EXTENDED RELEASE TRANSDERMAL
COMMUNITY
End: 2018-10-05 | Stop reason: SDUPTHER

## 2018-10-05 RX ORDER — DICLOFENAC SODIUM 10 MG/G
GEL TOPICAL
Refills: 0 | COMMUNITY
Start: 2018-08-07 | End: 2022-10-26

## 2018-10-05 RX ORDER — PREGABALIN 75 MG/1
75 CAPSULE ORAL 2 TIMES DAILY
Qty: 60 CAP | Refills: 1 | Status: SHIPPED | OUTPATIENT
Start: 2018-10-05 | End: 2022-10-19 | Stop reason: SDUPTHER

## 2018-10-05 RX ORDER — SCOLOPAMINE TRANSDERMAL SYSTEM 1 MG/1
1 PATCH, EXTENDED RELEASE TRANSDERMAL
Qty: 10 PATCH | Refills: 2 | Status: SHIPPED | OUTPATIENT
Start: 2018-10-05

## 2018-10-05 RX ORDER — LISINOPRIL AND HYDROCHLOROTHIAZIDE 10; 12.5 MG/1; MG/1
TABLET ORAL
Refills: 0 | COMMUNITY
Start: 2018-09-25 | End: 2022-01-31

## 2018-10-05 NOTE — MR AVS SNAPSHOT
[address] [place] [address] 
[phone] Patient: Aurora Vargas MRN: [id number] Visit Information Date & Time Provider Department Dept. Phone Encounter #  
 10/5/2018  8:40 AM Gavin Dye MD [facility] at [address] [place] [address] [id number] Follow-up Instructions Return in about 2 months (around 12/5/2018). Upcoming Health Maintenance Date Due DTaP/Tdap/Td series (1 - Tdap) 3/4/1963 Shingrix Vaccine Age 50> (1 of 2) 3/4/1992 GLAUCOMA SCREENING Q2Y 3/4/2007 Bone Densitometry (Dexa) Screening 3/4/2007 Pneumococcal 65+ Low/Medium Risk (1 of 2 - PCV13) 3/4/2007 MEDICARE YEARLY EXAM 3/14/2018 Influenza Age 5 to Adult 8/1/2018 Allergies as of 10/5/2018  Review Complete On: 10/5/2018 By: Umesh Lacey MD  
  
 Severity Noted Reaction Type Reactions Codeine  04/10/2017    Swelling Erythromycin  04/10/2017    Itching And swelling Lovastatin  04/10/2017    Swelling Minocin [Minocycline]  04/10/2017    Swelling Naproxen  08/09/2017    Drowsiness Neurontin [Gabapentin]  04/10/2017    Swelling Pcn [Penicillins]  04/10/2017    Swelling Sulfa (Sulfonamide Antibiotics)  04/10/2017    Itching Tramadol  08/09/2017    Drowsiness Current Immunizations  Never Reviewed No immunizations on file. Not reviewed this visit You Were Diagnosed With   
  
 Codes Comments Medication refill    -  Primary ICD-10-CM: Z76.0 ICD-9-CM: V68.1 Pain in both lower extremities     ICD-10-CM: M79.604, M79.605 ICD-9-CM: 729.5 Sensory disorder     ICD-10-CM: R20.9 ICD-9-CM: 782.0 Neuropathy     ICD-10-CM: G62.9 ICD-9-CM: 355.9 Migraine without aura and without status migrainosus, not intractable     ICD-10-CM: G91.120 ICD-9-CM: 346.10 Acute bilateral low back pain without sciatica     ICD-10-CM: M54.5 ICD-9-CM: 724.2, 338.19 POORNIMA (generalized anxiety disorder)     ICD-10-CM: F41.1 ICD-9-CM: 300.02   
 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 
ICD-9-CM: 724.4 Use of opiates for therapeutic purposes     ICD-10-CM: Z79.891 ICD-9-CM: V58.69 Vitals BP Pulse Temp Resp Height(growth percentile) 126/79 (BP 1 Location: Left arm, BP Patient Position: Sitting) 74 98.2 °F (36.8 °C) (Temporal) 16 5' 1\" (1.549 m) Weight(growth percentile) SpO2 BMI OB Status Smoking Status 214 lb 12.8 oz (97.4 kg) 96% 40.59 kg/m2 Menopause Former Smoker BMI and BSA Data Body Mass Index Body Surface Area 40.59 kg/m 2 2.05 m 2 Preferred Pharmacy Pharmacy Name Phone [facility] [address] [place] [address] [facility] [phone] Your Updated Medication List  
  
   
This list is accurate as of 10/5/18  9:54 AM.  Always use your most recent med list.  
  
  
  
  
 aspirin delayed-release 81 mg tablet Take  by mouth daily. diazePAM 10 mg tablet Commonly known as:  VALIUM Take 1 Tab by mouth every twelve (12) hours as needed for Anxiety. Max Daily Amount: 20 mg.  
  
 diclofenac 1 % Gel Commonly known as:  VOLTAREN  
  
 folic acid 1 mg tablet Commonly known as:  Google Take  by mouth daily. HYDROcodone-acetaminophen 7.5-325 mg per tablet Commonly known as:  [address] Take 1 Tab by mouth every eight (8) hours as needed for Pain. Max Daily Amount: 3 Tabs. hydroxychloroquine 200 mg tablet Commonly known as:  PLAQUENIL Take 200 mg by mouth two (2) times a day. levothyroxine 100 mcg tablet Commonly known as:  SYNTHROID  
120 mcg daily. lisinopril-hydroCHLOROthiazide 10-12.5 mg per tablet Commonly known as:  PRINZIDE, ZESTORETIC  
take 1 tablet by mouth once daily  
  
 meclizine 25 mg tablet Commonly known as:  ANTIVERT Take 1 Tab by mouth nightly. methotrexate 2.5 mg tablet Commonly known as:  Arty Caldwell Take  by mouth every . 6 capsules  
  
 nortriptyline 10 mg capsule Commonly known as:  PAMELOR Take 1 Cap by mouth nightly. Oxygen  
nightly. 2 liters  
  
 predniSONE 10 mg tablet Commonly known as:  Sugar Rands Take 1 Tab by mouth two (2) times a day. pregabalin 75 mg capsule Commonly known as:  Hedwig Sadiq Take 1 Cap by mouth two (2) times a day. Max Daily Amount: 150 mg.  
  
 scopolamine 1 mg over 3 days Pt3d Commonly known as:  TRANSDERM-SCOP  
1 Patch by TransDERmal route every seventy-two (72) hours. [address] [place] [address] Take 2 Puffs by inhalation daily. valACYclovir 1 gram tablet Commonly known as:  VALTREX Take 1 Tab by mouth daily. verapamil  mg ER capsule Commonly known as:  Jenmadisyn Drown Take 1 Cap by mouth daily. VITAMIN D3 2,000 unit Tab Generic drug:  cholecalciferol (vitamin D3) Take  by mouth daily. Prescriptions Printed Refills  
 pregabalin (LYRICA) 75 mg capsule 1 Sig: Take 1 Cap by mouth two (2) times a day. Max Daily Amount: 150 mg.  
 Class: Print Route: Oral  
  
Prescriptions Sent to Pharmacy Refills  
 scopolamine (TRANSDERM-SCOP) 1 mg over 3 days pt3d 2 Si Patch by TransDERmal route every seventy-two (72) hours. Class: Normal  
 Pharmacy: [facility] DR ROSALBA NEW [address] [place] [address], [address] [place] [address] Ph #: [phone] Route: TransDERmal  
  
We Performed the Following [address] [place] [address] MONITORING [[id number] Custom] Follow-up Instructions Return in about 2 months (around 2018). To-Do List   
 10/05/2018 Neurology:  EMG NCV MOTOR WITH F/WAVE PER NERVE   
  
 10/05/2018 Imaging:  MRI LUMB SPINE WO CONT Patient Instructions All test results will be discussed at the next apppointment. MRI of the Head: About This Test 
What is it? MRI (magnetic resonance imaging) is a test that uses a magnetic field and pulses of radio wave energy to make pictures of the organs and structures inside the body. An MRI of the head can give your doctor information about your brain, eyes, ears, and nerves. When you have an MRI, you lie on a table and the table moves into the MRI machine. Why is this test done? An MRI of the head can help find problems such as tumors and infection. It also can check symptoms of a head injury or of diseases such as multiple sclerosis (MS) and stroke. How can you prepare for the test? 
Talk to your doctor about all your health conditions before the test. For example, tell your doctor if: 
· You are allergic to any medicines. · You are or might be pregnant. · You have a pacemaker, an artificial limb, any metal pins or metal parts in your body, metal heart valves, metal clips in your brain, metal implants in your ears, or any other implanted or prosthetic medical device. · You have an intrauterine device (IUD) in place. · You get nervous in confined spaces. You may need medicine to help you relax. · You wear a patch that contains medicine. · You have kidney disease. What happens before the test? 
· You will remove all metal objects, such as hearing aids, dentures, jewelry, watches, and hairpins. · You may need to take off some of your clothes. You will be given a gown to wear during the test. If you do leave some clothes on, make sure you take everything out of your pockets. · You may have contrast material (dye) put into your arm through a tube called an IV. Contrast material helps doctors see specific organs, blood vessels, and most tumors. What happens during the test? 
· You will lie on your back on a table that is part of the MRI scanner. Your head, chest, and arms may be held with straps to help you lie still. · The table will slide into the space that contains the magnet. A device called a coil may be placed over or wrapped around your head. · Inside the scanner you will hear a fan and feel air moving. You may hear tapping, thumping, or snapping noises. You may be given earplugs or headphones to reduce the noise. · You will be asked to hold still during the scan. You may be asked to hold your breath for short periods. · You may be alone in the scanning room, but a technologist will be watching you through a window and talking with you during the test. 
What else should you know about the test? 
· An MRI does not hurt. · You may feel warmth in the area being examined. This is normal. 
· A dye (contrast material) that contains gadolinium may be used in this test. Be sure to tell your doctor if: 
Soumya Bhakta are pregnant or think you may be pregnant. ¨ You have kidney problems. [place] [employer] had more than one test that used gadolinium. · The U.S. Food and Drug Administration (FDA) has safety warnings about gadolinium. But for most people, the benefit of its use in this test outweighs the risk. · If a dye is used, you may feel a quick sting or pinch and some coolness when the IV is started. The dye may give you a metallic taste in your mouth. Some people feel sick to their stomach or get a headache. · If you breastfeed and are concerned about whether the dye used in this test is safe, talk to your doctor. Most experts believe that very little dye passes into breast milk and even less is passed on to the baby. But if you prefer, you can store some of your breast milk ahead of time and use it for a day or two after the test. 
How long does the test take? · The test usually takes 30 to 60 minutes but can take as long as 2 hours. What happens after the test? 
· You will probably be able to go home right away, depending on the reason for the test. 
· You can go back to your usual activities right away. Follow-up care is a key part of your treatment and safety. Be sure to make and go to all appointments, and call your doctor if you are having problems. It's also a good idea to keep a list of the medicines you take. Ask your doctor when you can expect to have your test results. Where can you learn more? Go to http://jovan-catie.info/. Enter O269 in the search box to learn more about \"MRI of the Head: About This Test.\" Current as of: June 26, 2018 Content Version: 11.8 © 8966-2190 [employer] Care instructions adapted under license by [employer] (which disclaims liability or warranty for this information). If you have questions about a medical condition or this instruction, always ask your healthcare professional. [address] any warranty or liability for your use of this information. Electromyogram (EMG) and Nerve Conduction Studies: About These Tests What are they? An electromyogram (EMG) measures the electrical activity of your muscles when you are not using them (at rest) and when you tighten them (muscle contraction). Nerve conduction studies (NCS) measure how well and how fast the nerves can send electrical signals. EMG and nerve conduction studies are often done together. If they are done together, the nerve conduction studies are done before the EMG. Why are they done? You may need an EMG to find diseases that damage your muscles or nerves or to find why you cannot move your muscles (paralysis), why they feel weak, or why they twitch. You may need nerve conduction studies to find damage to the nerves that lead from the brain and spinal cord to the rest of the body (peripheral nervous system). Nerve conduction studies are often used to help find nerve disorders, such as carpal tunnel syndrome. How can you prepare for these tests?  
 · Tell your doctors ALL the medicines, vitamins, supplements, and herbal remedies you take. Some medicines can affect the test results. You may need to stop taking some medicines before you have this test.  
  · If you take aspirin or some other blood thinner, be sure to talk to your doctor. He or she will tell you if you should stop taking it before your test. Make sure that you understand exactly what your doctor wants you to do.  
  · Wear loose-fitting clothing. You may be given a hospital gown to wear.  
  · The electrodes for the test are attached to your skin. Your skin needs to be clean and free of sprays, oils, creams, and lotions. What happens during the tests? You lie on a table or bed or sit in a reclining chair so your muscles are relaxed. For an EMG: 
· Your doctor will insert a needle electrode into a muscle. This will record the electrical activity while the muscle is at rest. You may feel a quick, sharp pain when the needle electrode is put into a muscle. · Your doctor will ask you to tighten the same muscle slowly and steadily while the electrical activity is recorded. · Your doctor may move the electrode to a different area of the muscle or a different muscle. For nerve conduction studies: 
· Your doctor will attach two types of electrodes to your skin. ¨ One type of electrode is placed over a nerve and will give the nerve an electrical pulse. ¨ The other type of electrode is placed over the muscle that the nerve controls. It will record how long it takes the muscle to react to the electrical pulse. · You will be able to feel the electrical pulses. They are small shocks and are safe. What else should you know about these tests? · After an EMG, you may be sore and have a tingling feeling in your muscles for up to 2 days. You may have small bruises or swelling at the needle site. · For an EMG, you may be asked to sign a consent form.  Talk to your doctor about any concerns you have about the need for the test, its risks, how it will be done, or what the results will mean. How long do they take? · An EMG may take 30 to 60 minutes. · Nerve conduction tests may take from 15 minutes to 1 hour or more. It depends on how many nerves and muscles your doctor tests. What happens after these tests? · If any of the test areas are sore: ¨ Put ice or a cold pack on the area for 10 to 20 minutes at a time. Put a thin cloth between the ice and your skin. ¨ Take an over-the-counter pain medicine, such as acetaminophen (Tylenol), ibuprofen (Advil, Motrin), or naproxen (Aleve). Be safe with medicines. Read and follow all instructions on the label. · You will probably be able to go home right away. · You can go back to your usual activities right away. When should you call for help? Watch closely for changes in your health, and be sure to contact your doctor if: · Muscle pain from an EMG test gets worse or you have swelling, tenderness, or pus at any of the needle sites. · You have any problems that you think may be from the test. 
· You have any questions about the test or have not received your results. Follow-up care is a key part of your treatment and safety. Be sure to make and go to all appointments, and call your doctor if you are having problems. It's also a good idea to keep a list of the medicines you take. Ask your doctor when you can expect to have your test results. Where can you learn more? Go to http://jovan-catie.info/. Enter C845 in the search box to learn more about \"Electromyogram (EMG) and Nerve Conduction Studies: About These Tests. \" Current as of: June 4, 2018 Content Version: 11.8 © 9167-1199 [employer]. Care instructions adapted under license by [employer] (which disclaims liability or warranty for this information).  If you have questions about a medical condition or this instruction, always ask your healthcare professional. [employer] disclaims any warranty or liability for your use of this information. Introducing [facility]! [facility] introduces All Web Leads patient portal. Now you can access parts of your medical record, email your doctor's office, and request medication refills online. 1. In your internet browser, go to [email or web address] 2. Click on the First Time User? Click Here link in the Sign In box. You will see the New Member Sign Up page. 3. Enter your All Web Leads Access Code exactly as it appears below. You will not need to use this code after youve completed the sign-up process. If you do not sign up before the expiration date, you must request a new code. · [employer] Web Leads Access Code: [email or web address] Expires: 12/10/2018  9:59 AM 
 
4. Enter the last four digits of your Social Security Number (xxxx) and Date of Birth (mm/dd/yyyy) as indicated and click Submit. You will be taken to the next sign-up page. 5. Create a All [employer]s ID. This will be your [employer] Web [employer]s login ID and cannot be changed, so think of one that is secure and easy to remember. 6. Create a [employer] password. You can change your password at any time. 7. Enter your Password Reset Question and Answer. This can be used at a later time if you forget your password. 8. Enter your e-mail address. You will receive e-mail notification when new information is available in [address] 9. Click Sign Up. You can now view and download portions of your medical record. 10. Click the Download Summary menu link to download a portable copy of your medical information. If you have questions, please visit the Frequently Asked Questions section of the [employer] website. Remember, All Web [employer]s is NOT to be used for urgent needs. For medical emergencies, dial 911. Now available from your iPhone and Android! Please provide this summary of care documentation to your next provider. Your primary care clinician is listed as Danyelle Lagos. If you have any questions after today's visit, please call [phone].

## 2018-10-05 NOTE — PROGRESS NOTES
Neurology Progress Note    NAME:  Aspen Santos   :   1940   MRN:   [id number]     Date/Time:  10/5/2018  Subjective: Aspen Santos is a 70 y.o. female here today for follow-up for pain, numbness or tingling sensation leg pain and back pain, anxiety, test results. Patient was accompanied by her daughter [de-identified]. She says she still having the back pain, back pain is continuous, sharp in nature, burning sensation that goes down to the legs causing weakness, numbness and tingling sensation of the left. Patient currently is in physical therapy which she says has helped a little bit according to patient, she also getting dry needling needling treatment for her back. She says she is also having leg pain, she says the pain is sometimes accompanied by heaviness of the legs, she will feel as if her legs are swollen, she however denies incontinence. Says her dizziness waxes and wanes, has not been very frequent, however when she experiences headache is always accompanied with dizziness  Denies loss of consciousness, dysphagia or odynophagia. Blood work reviewed with patient was unremarkable, x-ray of the lumbar spine showed lumbar spondylosis with degeneration. I will obtain MRI of the lumbosacral spine, EMG nerve conduction of the extremities. Patient to continue with her physical therapy.   Review of Systems - General ROS: positive for  - fatigue and sleep disturbance  Psychological ROS: positive for - anxiety, depression and sleep disturbances  Ophthalmic ROS: positive for - blurry vision and uses glasses  ENT ROS: positive for - headaches, tinnitus, vertigo and visual changes  Allergy and Immunology ROS: negative  Hematological and Lymphatic ROS: negative  Endocrine ROS: negative  Respiratory ROS: no cough, shortness of breath, or wheezing  Cardiovascular ROS: no chest pain or dyspnea on exertion  Gastrointestinal ROS: no abdominal pain, change in bowel habits, or black or bloody stools  Genito-Urinary ROS: no dysuria, trouble voiding, or hematuria  Musculoskeletal ROS: positive for - gait disturbance, joint pain, joint stiffness, joint swelling, muscle pain, muscular weakness and pain in back - bilateral and leg - bilateral  Neurological ROS: positive for - dizziness, gait disturbance, headaches, numbness/tingling and weakness  Dermatological ROS: negative      Medications reviewed:  Current Outpatient Prescriptions   Medication Sig Dispense Refill    lisinopril-hydroCHLOROthiazide (PRINZIDE, ZESTORETIC) 10-12.5 mg per tablet take 1 tablet by mouth once daily  0    scopolamine (TRANSDERM-SCOP) 1 mg over 3 days pt3d 1 Patch by TransDERmal route every seventy-two (72) hours. 10 Patch 2    pregabalin (LYRICA) 75 mg capsule Take 1 Cap by mouth two (2) times a day. Max Daily Amount: 150 mg. 60 Cap 1    verapamil ER (VERELAN) 120 mg ER capsule Take 1 Cap by mouth daily. 30 Cap 6    diazePAM (VALIUM) 10 mg tablet Take 1 Tab by mouth every twelve (12) hours as needed for Anxiety. Max Daily Amount: 20 mg. 60 Tab 3    nortriptyline (PAMELOR) 10 mg capsule Take 1 Cap by mouth nightly. 30 Cap 1    HYDROcodone-acetaminophen (NORCO) 7.5-325 mg per tablet Take 1 Tab by mouth every eight (8) hours as needed for Pain. Max Daily Amount: 3 Tabs. 15 Tab 0    valACYclovir (VALTREX) 1 gram tablet Take 1 Tab by mouth daily. 30 Tab 1    levothyroxine (SYNTHROID) 100 mcg tablet 120 mcg daily. 0    cholecalciferol, vitamin D3, (VITAMIN D3) 2,000 unit tab Take  by mouth daily.  TIOTROPIUM BROMIDE (SPIRIVA WITH HANDIHALER IN) Take 2 Puffs by inhalation daily.  aspirin delayed-release 81 mg tablet Take  by mouth daily.  hydroxychloroquine (PLAQUENIL) 200 mg tablet Take 200 mg by mouth two (2) times a day.  methotrexate (RHEUMATREX) 2.5 mg tablet Take  by mouth every Sunday. 6 capsules      folic acid (FOLVITE) 1 mg tablet Take  by mouth daily.  Oxygen nightly.  2 liters      diclofenac (VOLTAREN) 1 % gel 0    predniSONE (DELTASONE) 10 mg tablet Take 1 Tab by mouth two (2) times a day. 30 Tab 0    meclizine (ANTIVERT) 25 mg tablet Take 1 Tab by mouth nightly. 60 Tab 3        Objective:   Vitals:  Vitals:    10/05/18 0909   BP: 126/79   Pulse: 74   Resp: 16   Temp: 98.2 °F (36.8 °C)   TempSrc: Temporal   SpO2: 96%   Weight: 214 lb 12.8 oz (97.4 kg)   Height: 5' 1\" (1.549 m)   PainSc:   5   PainLoc: Generalized       Lab Data Reviewed:  No results found for: WBC, HCT, HGB, PLT, HCTEXT, HGBEXT, PLTEXT, HCTEXT, HGBEXT, PLTEXT    No results found for: NA, K, CL, CO2, GLU, BUN, CREA, CA    No components found for: TROPQUANT    No results found for: PAMELA      No results found for: HBA1C, HGBE8, BCO4GOGI, YPC4OVWT, UGG0LYUA     Lab Results   Component Value Date/Time    Vitamin B12 426 09/11/2018 10:38 AM       No results found for: PAMELA, ANARX, ANAIGG, XBANA    No results found for: CHOL, CHOLPOCT, CHOLX, CHLST, CHOLV, HDL, HDLPOC, LDL, LDLCPOC, LDLC, DLDLP, VLDLC, VLDL, TGLX, TRIGL, TRIGP, TGLPOCT, CHHD, CHHDX      CT Results (recent):  No results found for this or any previous visit. MRI Results (recent):    Results from [place] encounter on 05/16/18   MRI BRAIN W WO CONT   Narrative CLINICAL HISTORY: Dizziness, paresthesias and headaches    INDICATION: Dizziness, paresthesias and headache. COMPARISON: 4/19/2017    TECHNIQUE: MR examination of the brain includes axial and sagittal T1  pre-and-post contrast, axial T2, axial FLAIR, axial gradient echo, axial DWI,  coronal T1 postcontrast.  CONTRAST: The patient was then administered gadolinium-based contrast material  axial and coronal T1-weighted postcontrast enhanced imaging was obtained. FINDINGS:   There is no intracranial mass, hemorrhage or evidence of acute infarction. Intraosseous lesion right parietal bone is stable compared to the prior study.   Minimal periventricular minimal scattered foci of increased T2 signal density in  the cerebral white matter. Trace fluid in the inferior mastoid air cell on the  left. There is no Chiari or syrinx. The pituitary and infundibulum are grossly  unremarkable. There is no skull base mass. Cerebellopontine angles are grossly  unremarkable. The major intracranial vascular flow-voids are unremarkable. There is no abnormal parenchymal enhancement. There is no abnormal meningeal  enhancement. The cavernous sinuses are symmetric. Optic chiasm and infundibulum  grossly unremarkable. Orbits are grossly symmetric. Dural venous sinuses are  grossly patent. The brain architecture is normal. There is no evidence of  midline shift or mass-effect. There are no extra-axial fluid collections. The mastoid air cells are well pneumatized and clear. Impression IMPRESSION:  Stable examination. Minimal chronic microvascular ischemic change. No intracranial mass, hemorrhage  or evidence of acute infarction. IR Results (recent):  No results found for this or any previous visit. VAS/US Results (recent):  No results found for this or any previous visit. PHYSICAL EXAM:  General:    Alert, cooperative, no distress, appears stated age. Head:   Normocephalic, without obvious abnormality, atraumatic. Eyes:   Conjunctivae/corneas clear. PERRLA  Nose:  Nares normal. No drainage or sinus tenderness. Throat:    Lips, mucosa, and tongue normal.  No Thrush  Neck:  Supple, symmetrical,  no adenopathy, thyroid: non tender    no carotid bruit and no JVD. Paraspinal tenderness  Back:    Symmetric,  bilateral tenderness. Lungs:   Clear to auscultation bilaterally. No Wheezing or Rhonchi. No rales. Chest wall:  No tenderness or deformity. No Accessory muscle use. Heart:   Regular rate and rhythm,  no murmur, rub or gallop. Abdomen:   Soft, non-tender. Not distended. Bowel sounds normal. No masses  Extremities: Extremities normal, atraumatic, No cyanosis. Edema+.  No clubbing  Skin:     Texture, turgor normal. No rashes or lesions. Not Jaundiced  Lymph nodes: Cervical, supraclavicular normal.  Psych:  Good insight. Not depressed. Anxious . NEUROLOGICAL EXAM:  Appearance: The patient is well developed, well nourished, provides a coherent history and is in no acute distress. Mental Status: Oriented to time, place and person. Mood and affect appropriate. Cranial Nerves:   Intact visual fields. Fundi are benign. GITA, EOM's full, no nystagmus, no ptosis. Facial sensation is normal. Corneal reflexes are intact. Facial movement is symmetric. Hearing is normal bilaterally. Palate is midline with normal sternocleidomastoid and trapezius muscles are normal. Tongue is midline. Motor:  5/5 strength in upper and lower proximal and distal muscles. Normal bulk and tone. No fasciculations. Reflexes:   Deep tendon reflexes 2+/4 and symmetrical.   Sensory:   Dysesthesia to touch, pinprick and vibration. Gait:  Unsteady gait. Tremor:   No tremor noted. Cerebellar:  No cerebellar signs present. Neurovascular:  Normal heart sounds and regular rhythm, peripheral pulses intact, and no carotid bruits. Assesment  1. Medication refill    - scopolamine (TRANSDERM-SCOP) 1 mg over 3 days pt3d; 1 Patch by TransDERmal route every seventy-two (72) hours. 2. Pain in both lower extremities  Physical therapy    3. Sensory disorder  EMG/NCS all ext  4. Neuropathy  EMG/NCS    5. Migraine without aura and without status migrainosus, not intractable  Continue management    6. Acute bilateral low back pain without sciatica  Continue management  MRI Lumbosacral spine    7. POORNIMA (generalized anxiety disorder)  Continue management    8. PHN (postherpetic neuralgia)  Stable    9. Trigeminal neuritis  Continue management    ___________________________________________________  PLAN:      ICD-10-CM ICD-9-CM    1. Medication refill Z76.0 V68.1 scopolamine (TRANSDERM-SCOP) 1 mg over 3 days pt3d   2.  Pain in both lower extremities M79.604 729.5 MRI LUMB SPINE WO CONT    M79.605  COMPLIANCE DRUG SCREEN/PRESCRIPTION MONITORING   3. Sensory disorder R20.9 782.0 EMG NCV MOTOR WITH F/WAVE PER NERVE      COMPLIANCE DRUG SCREEN/PRESCRIPTION MONITORING   4. Neuropathy G62.9 355.9 MRI LUMB SPINE WO CONT      EMG NCV MOTOR WITH F/WAVE PER NERVE      COMPLIANCE DRUG SCREEN/PRESCRIPTION MONITORING   5. Migraine without aura and without status migrainosus, not intractable G43.009 346.10 COMPLIANCE DRUG SCREEN/PRESCRIPTION MONITORING   6. Acute bilateral low back pain without sciatica M54.5 724.2 MRI LUMB SPINE WO CONT     338.19 COMPLIANCE DRUG SCREEN/PRESCRIPTION MONITORING   7. POORNIMA (generalized anxiety disorder) F41.1 300.02 COMPLIANCE DRUG SCREEN/PRESCRIPTION MONITORING   8. PHN (postherpetic neuralgia) B02.29 053.19 pregabalin (LYRICA) 75 mg capsule      COMPLIANCE DRUG SCREEN/PRESCRIPTION MONITORING   9. Trigeminal neuritis G50.8 350. 1 pregabalin (LYRICA) 75 mg capsule      COMPLIANCE DRUG SCREEN/PRESCRIPTION MONITORING   10. Lumbar radiculopathy M54.16 724.4 MRI LUMB SPINE WO CONT      EMG NCV MOTOR WITH F/WAVE PER NERVE      COMPLIANCE DRUG SCREEN/PRESCRIPTION MONITORING   11. Use of opiates for therapeutic purposes Z79.891 V58.69 COMPLIANCE DRUG SCREEN/PRESCRIPTION MONITORING     Follow-up Disposition:  Return in about 2 months (around 12/5/2018).            ___________________________________________________    Total time spent with patient:  []15   []25   []35   [] __ minutes    Care Plan discussed with:    [x]Patient   []Family    []Care Manager   []Consultant/Specialist :    ___________________________________________________    Attending Physician: Mirtha Cedeño MD

## 2018-10-05 NOTE — PROGRESS NOTES
Chief Complaint   Patient presents with    Migraine    Results     labs and MRI    Medication Refill     1. Have you been to the ER, urgent care clinic since your last visit? Hospitalized since your last visit? no    2. Have you seen or consulted any other health care providers outside of the [address] [place] [address] since your last visit? Include any pap smears or colon screening.  Dr. Desean Vang count not performed patient did not bring medications       Pill count verified with patient  Patient reports taking medication    UDS obtained and sent  Pain contract on file   not available for review  Script given for Lyrica

## 2018-10-05 NOTE — PATIENT INSTRUCTIONS
All test results will be discussed at the next apppointment. MRI of the Head: About This Test  What is it? MRI (magnetic resonance imaging) is a test that uses a magnetic field and pulses of radio wave energy to make pictures of the organs and structures inside the body. An MRI of the head can give your doctor information about your brain, eyes, ears, and nerves. When you have an MRI, you lie on a table and the table moves into the MRI machine. Why is this test done? An MRI of the head can help find problems such as tumors and infection. It also can check symptoms of a head injury or of diseases such as multiple sclerosis (MS) and stroke. How can you prepare for the test?  Talk to your doctor about all your health conditions before the test. For example, tell your doctor if:  · You are allergic to any medicines. · You are or might be pregnant. · You have a pacemaker, an artificial limb, any metal pins or metal parts in your body, metal heart valves, metal clips in your brain, metal implants in your ears, or any other implanted or prosthetic medical device. · You have an intrauterine device (IUD) in place. · You get nervous in confined spaces. You may need medicine to help you relax. · You wear a patch that contains medicine. · You have kidney disease. What happens before the test?  · You will remove all metal objects, such as hearing aids, dentures, jewelry, watches, and hairpins. · You may need to take off some of your clothes. You will be given a gown to wear during the test. If you do leave some clothes on, make sure you take everything out of your pockets. · You may have contrast material (dye) put into your arm through a tube called an IV. Contrast material helps doctors see specific organs, blood vessels, and most tumors. What happens during the test?  · You will lie on your back on a table that is part of the MRI scanner.  Your head, chest, and arms may be held with straps to help you lie still.  · The table will slide into the space that contains the magnet. A device called a coil may be placed over or wrapped around your head. · Inside the scanner you will hear a fan and feel air moving. You may hear tapping, thumping, or snapping noises. You may be given earplugs or headphones to reduce the noise. · You will be asked to hold still during the scan. You may be asked to hold your breath for short periods. · You may be alone in the scanning room, but a technologist will be watching you through a window and talking with you during the test.  What else should you know about the test?  · An MRI does not hurt. · You may feel warmth in the area being examined. This is normal.  · A dye (contrast material) that contains gadolinium may be used in this test. Be sure to tell your doctor if:  Ten [place] are pregnant or think you may be pregnant. ¨ You have kidney problems. Mal Comas had more than one test that used gadolinium. · The U.S. Food and Drug Administration (FDA) has safety warnings about gadolinium. But for most people, the benefit of its use in this test outweighs the risk. · If a dye is used, you may feel a quick sting or pinch and some coolness when the IV is started. The dye may give you a metallic taste in your mouth. Some people feel sick to their stomach or get a headache. · If you breastfeed and are concerned about whether the dye used in this test is safe, talk to your doctor. Most experts believe that very little dye passes into breast milk and even less is passed on to the baby. But if you prefer, you can store some of your breast milk ahead of time and use it for a day or two after the test.  How long does the test take? · The test usually takes 30 to 60 minutes but can take as long as 2 hours. What happens after the test?  · You will probably be able to go home right away, depending on the reason for the test.  · You can go back to your usual activities right away.   Follow-up care is a key part of your treatment and safety. Be sure to make and go to all appointments, and call your doctor if you are having problems. It's also a good idea to keep a list of the medicines you take. Ask your doctor when you can expect to have your test results. Where can you learn more? Go to http://jovan-catie.info/. Enter V925 in the search box to learn more about \"MRI of the Head: About This Test.\"  Current as of: June 26, 2018  Content Version: 11.8  © 1620-7275 [employer]. Care instructions adapted under license by [employer] (which disclaims liability or warranty for this information). If you have questions about a medical condition or this instruction, always ask your healthcare professional. [address] any warranty or liability for your use of this information. Electromyogram (EMG) and Nerve Conduction Studies: About These Tests  What are they? An electromyogram (EMG) measures the electrical activity of your muscles when you are not using them (at rest) and when you tighten them (muscle contraction). Nerve conduction studies (NCS) measure how well and how fast the nerves can send electrical signals. EMG and nerve conduction studies are often done together. If they are done together, the nerve conduction studies are done before the EMG. Why are they done? You may need an EMG to find diseases that damage your muscles or nerves or to find why you cannot move your muscles (paralysis), why they feel weak, or why they twitch. You may need nerve conduction studies to find damage to the nerves that lead from the brain and spinal cord to the rest of the body (peripheral nervous system). Nerve conduction studies are often used to help find nerve disorders, such as carpal tunnel syndrome. How can you prepare for these tests? · Tell your doctors ALL the medicines, vitamins, supplements, and herbal remedies you take.  Some medicines can affect the test results. You may need to stop taking some medicines before you have this test.     · If you take aspirin or some other blood thinner, be sure to talk to your doctor. He or she will tell you if you should stop taking it before your test. Make sure that you understand exactly what your doctor wants you to do.     · Wear loose-fitting clothing. You may be given a hospital gown to wear.     · The electrodes for the test are attached to your skin. Your skin needs to be clean and free of sprays, oils, creams, and lotions. What happens during the tests? You lie on a table or bed or sit in a reclining chair so your muscles are relaxed. For an EMG:  · Your doctor will insert a needle electrode into a muscle. This will record the electrical activity while the muscle is at rest. You may feel a quick, sharp pain when the needle electrode is put into a muscle. · Your doctor will ask you to tighten the same muscle slowly and steadily while the electrical activity is recorded. · Your doctor may move the electrode to a different area of the muscle or a different muscle. For nerve conduction studies:  · Your doctor will attach two types of electrodes to your skin. ¨ One type of electrode is placed over a nerve and will give the nerve an electrical pulse. ¨ The other type of electrode is placed over the muscle that the nerve controls. It will record how long it takes the muscle to react to the electrical pulse. · You will be able to feel the electrical pulses. They are small shocks and are safe. What else should you know about these tests? · After an EMG, you may be sore and have a tingling feeling in your muscles for up to 2 days. You may have small bruises or swelling at the needle site. · For an EMG, you may be asked to sign a consent form. Talk to your doctor about any concerns you have about the need for the test, its risks, how it will be done, or what the results will mean.   How long do they take?  · An EMG may take 30 to 60 minutes. · Nerve conduction tests may take from 15 minutes to 1 hour or more. It depends on how many nerves and muscles your doctor tests. What happens after these tests? · If any of the test areas are sore:  ¨ Put ice or a cold pack on the area for 10 to 20 minutes at a time. Put a thin cloth between the ice and your skin. ¨ Take an over-the-counter pain medicine, such as acetaminophen (Tylenol), ibuprofen (Advil, Motrin), or naproxen (Aleve). Be safe with medicines. Read and follow all instructions on the label. · You will probably be able to go home right away. · You can go back to your usual activities right away. When should you call for help? Watch closely for changes in your health, and be sure to contact your doctor if:  · Muscle pain from an EMG test gets worse or you have swelling, tenderness, or pus at any of the needle sites. · You have any problems that you think may be from the test.  · You have any questions about the test or have not received your results. Follow-up care is a key part of your treatment and safety. Be sure to make and go to all appointments, and call your doctor if you are having problems. It's also a good idea to keep a list of the medicines you take. Ask your doctor when you can expect to have your test results. Where can you learn more? Go to http://jovan-catie.info/. Enter A435 in the search box to learn more about \"Electromyogram (EMG) and Nerve Conduction Studies: About These Tests. \"  Current as of: June 4, 2018  Content Version: 11.8  © 2089-1216 [employer]. Care instructions adapted under license by [employer] (which disclaims liability or warranty for this information). If you have questions about a medical condition or this instruction, always ask your healthcare professional. [address] any warranty or liability for your use of this information.

## 2018-10-12 LAB — DRUGS UR: NORMAL

## 2018-10-17 ENCOUNTER — HOSPITAL ENCOUNTER (OUTPATIENT)
Dept: MRI IMAGING | Age: 78
Discharge: HOME | End: 2018-10-17
Attending: PSYCHIATRY & NEUROLOGY
Payer: MEDICARE

## 2018-10-17 DIAGNOSIS — M79.604 PAIN IN BOTH LOWER EXTREMITIES: ICD-10-CM

## 2018-10-17 DIAGNOSIS — M54.50 ACUTE BILATERAL LOW BACK PAIN WITHOUT SCIATICA: ICD-10-CM

## 2018-10-17 DIAGNOSIS — M79.605 PAIN IN BOTH LOWER EXTREMITIES: ICD-10-CM

## 2018-10-17 DIAGNOSIS — G62.9 NEUROPATHY: ICD-10-CM

## 2018-10-17 DIAGNOSIS — M54.16 LUMBAR RADICULOPATHY: ICD-10-CM

## 2018-10-17 PROCEDURE — 72148 MRI LUMBAR SPINE W/O DYE: CPT

## 2018-11-06 ENCOUNTER — OFFICE VISIT (OUTPATIENT)
Dept: NEUROLOGY | Age: 78
End: 2018-11-06

## 2018-11-06 DIAGNOSIS — M54.50 ACUTE BILATERAL LOW BACK PAIN WITHOUT SCIATICA: ICD-10-CM

## 2018-11-06 DIAGNOSIS — G62.9 NEUROPATHY: Primary | ICD-10-CM

## 2018-11-06 DIAGNOSIS — R20.2 PARESTHESIA: ICD-10-CM

## 2018-11-06 DIAGNOSIS — M54.16 LUMBAR RADICULOPATHY: ICD-10-CM

## 2018-11-06 NOTE — PROGRESS NOTES
[address], [address], [address]  p: [phone]  f: [phone]    Test Date:  2018    Patient: Guilherme Bass : 1940 Physician:    Sex: Female Height: 5' 7\" Ref Phys:    ID#: [id number] Weight: 213 lbs. Technician: Alem Hayes     Patient Complaints:  b/l le numbness and tingling sensation, back pain    Medications: See chart      Patient History / Exam: This is a 82-year-old right-handed white female who is been evaluated for back pain, numbness and tingling sensation of the lower extremity, lower extremity weakness. NCV & EMG Findings:  Evaluation of the left Sup Fibular sensory and the right Sup Fibular sensory nerves showed no response (14 cm). The right sural sensory nerve showed no response (Calf). All remaining nerves (as indicated in the following tables) were within normal limits. Left vs. Right side comparison data for the tibial motor nerve indicates abnormal L-R latency difference (1.9 ms). All remaining left vs. right side differences were within normal limits. All F Wave latencies were within normal limits. All F Wave left vs. right side latency differences were within normal limits. All examined muscles (as indicated in the following table) showed no evidence of electrical instability. Impression: Abnormal study. 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 Etiology yet to be determined. Lumbar radiculopathy cannot be excluded.       Recommendations: Neuroimaging of the lumbosacral spine suggested.      ___________________________          Nerve Conduction Studies  Anti Sensory Summary Table     Stim Site NR Peak (ms) Norm Peak (ms) P-T Amp (µV) Norm P-T Amp Site1 Site2 Delta-P (ms) Dist (cm) Temo (m/s) Norm Temo (m/s)   Left Sup Fibular Anti Sensory (Ant Lat Mall)  32.2°C   14 cm NR  <4.4  >5.0 14 cm Ant Lat Mall  14.0  >32 Right Sup Fibular Anti Sensory (Ant Lat Mall)  32.7°C   14 cm NR  <4.4  >5.0 14 cm Ant Lat Mall  14.0  >32   Left Sural Anti Sensory (Lat Mall)  32.2°C   Calf    0.8 <4.0 132.3 >5.0 Calf Lat Mall 0.8 14.0 175 >35   Right Sural Anti Sensory (Lat Mall)  32.6°C   Calf NR  <4.0  >5.0 Calf Lat Mall  14.0  >35     Motor Summary Table     Stim Site NR Onset (ms) Norm Onset (ms) O-P Amp (mV) Norm O-P Amp Site1 Site2 Delta-0 (ms) Dist (cm) Temo (m/s) Norm Temo (m/s)   Left Fibular Motor (Ext Dig Brev)  30.7°C   Ankle    2.6 <6.1 2.5 >2.5 B Fib Ankle 7.8 32.0 41 >38   B Fib    10.4  1.4  Poplt B Fib 1.4 10.0 71 >40   Poplt    11.8  2.0          Right Fibular Motor (Ext Dig Brev)  32.4°C   Ankle    3.4 <6.1 2.7 >2.5 B Fib Ankle 7.1 32.0 45 >38   B Fib    10.5  1.9  Poplt B Fib 1.5 10.0 67 >40   Poplt    12.0  2.4          Left Tibial Motor (Abd Rondon Brev)  31.7°C   Ankle    5.5 <6.1 8.1 >3.0 Knee Ankle 8.9 38.0 43 >35   Knee    14.4  3.8          Right Tibial Motor (Abd Rondon Brev)  32.6°C   Ankle    3.6 <6.1 10.9 >3.0 Knee Ankle 9.8 38.0 39 >35   Knee    13.4  2.6            F Wave Studies     NR F-Lat (ms) Lat Norm (ms) L-R F-Lat (ms) L-R Lat Norm   Left Tibial (Abd Hallucis)  32°C      49.22 <61 0.10 <5.7   Right Tibial (Abd Hallucis)  32.7°C      49.32 <61 0.10 <5.7     EMG+     Side Muscle Nerve Root Ins Act Fibs Psw Amp Dur Poly Recrt Int [place] Comment   Right VastusMed Femoral L2-4 Nml Nml Nml Nml Nml 0 Nml Nml    Right VastusLat Femoral L2-4 Nml Nml Nml Nml Nml 0 Nml Nml    Right QuadratusFem QuadFemoris L4-5, S1 Nml Nml Nml Nml Nml 0 Nml Nml    Right AntTibialis Dp Br Fibular L4-5 Nml Nml Nml Nml Nml 0 Nml Nml    Right Fibularis Long Sup Br Fibular L5-S1 Nml Nml Nml Nml Nml 0 Nml Nml        Nerve Conduction Studies  Anti Sensory Left/Right Comparison     Stim Site L Lat (ms) R Lat (ms) L-R Lat (ms) L Amp (µV) R Amp (µV) L-R Amp (%) Site1 Site2 L Temo (m/s) R Temo (m/s) L-R Temo (m/s)   Sup Fibular Anti Sensory (Ant Lat Mall)  32.2°C   14 cm       14 cm Ant Lat Mall      Sural Anti Sensory (Lat Mall)  32.2°C   Calf 0.8   132.3   Calf Lat Mall 175       Motor Left/Right Comparison     Stim Site L Lat (ms) R Lat (ms) L-R Lat (ms) L Amp (mV) R Amp (mV) L-R Amp (%) Site1 Site2 L Temo (m/s) R Temo (m/s) L-R Temo (m/s)   Fibular Motor (Ext Dig Brev)  30.7°C   Ankle 2.6 3.4 0.8 2.5 2.7 7.4 B Fib Ankle 41 45 4   B Fib 10.4 10.5 0.1 1.4 1.9 26.3 Poplt B Fib 71 67 4   Poplt 11.8 12.0 0.2 2.0 2.4 16.7        Tibial Motor (Abd Rondon Brev)  31.7°C   Ankle 5.5 3.6 1.9 8.1 10.9 25.7 Knee Ankle 43 39 4   Knee 14.4 13.4 1.0 3.8 2.6 31.6              Waveforms:

## 2018-11-21 ENCOUNTER — OFFICE VISIT (OUTPATIENT)
Dept: NEUROLOGY | Age: 78
End: 2018-11-21

## 2018-11-21 VITALS
HEART RATE: 71 BPM | SYSTOLIC BLOOD PRESSURE: 124 MMHG | TEMPERATURE: 98.2 F | RESPIRATION RATE: 18 BRPM | OXYGEN SATURATION: 99 % | BODY MASS INDEX: 39.46 KG/M2 | DIASTOLIC BLOOD PRESSURE: 64 MMHG | WEIGHT: 209 LBS | HEIGHT: 61 IN

## 2018-11-21 DIAGNOSIS — R20.2 PARESTHESIA: ICD-10-CM

## 2018-11-21 DIAGNOSIS — M54.50 BILATERAL LOW BACK PAIN WITHOUT SCIATICA, UNSPECIFIED CHRONICITY: ICD-10-CM

## 2018-11-21 DIAGNOSIS — M79.18 MYOFASCIAL MUSCLE PAIN: ICD-10-CM

## 2018-11-21 DIAGNOSIS — M48.061 SPINAL STENOSIS OF LUMBAR REGION, UNSPECIFIED WHETHER NEUROGENIC CLAUDICATION PRESENT: ICD-10-CM

## 2018-11-21 DIAGNOSIS — R42 DIZZINESS: ICD-10-CM

## 2018-11-21 DIAGNOSIS — G62.9 POLYNEUROPATHY: Primary | ICD-10-CM

## 2018-11-21 RX ORDER — ALCOHOL 2.38 KG/3.79L
1 GEL TOPICAL DAILY
Qty: 30 CAP | Refills: 3 | Status: SHIPPED | OUTPATIENT
Start: 2018-11-21 | End: 2022-10-26

## 2018-11-21 NOTE — PROGRESS NOTES
Neurology Progress Note    NAME:  Cesario Benz   :   1940   MRN:   [id number]     Date/Time:  2018  Subjective: Cesario Benz is a 70 y.o. female here today for follow-up for pain, numbness or tingling sensation leg pain and back pain, anxiety, test results. Patient was accompanied by her daughter Martha Kaur. She says she still having the back pain, back pain is continuous, sharp in nature, burning sensation that goes down to the legs causing weakness, numbness and tingling sensation of the left. Patient just finished  physical therapy which she says has helped a little bit according to patient, she also getting dry needling needling treatment for her back. She says she is also having leg pain, she says the pain is sometimes accompanied by heaviness of the legs, she will feel as if her legs are swollen, she however denies incontinence. Says her dizziness waxes and wanes, has not been very frequent, however when she experiences headache is always accompanied with dizziness. Says her back appears to be be swelling  and pain. Lumbar brace and physical therapy  recommended  Denies loss of consciousness, dysphagia or odynophagia. Blood work reviewed with patient was unremarkable, x-ray of the lumbar spine showed lumbar spondylosis with degeneration.   EMG/Nerve conduction reviewed with patient and daughter showed bilateral sensory polyneuropathy, MRI Lumbosacral showed multifocal moderate stenosis and low lying cord  Review of Systems - General ROS: positive for  - fatigue and sleep disturbance  Psychological ROS: positive for - anxiety, depression and sleep disturbances  Ophthalmic ROS: positive for - blurry vision and uses glasses  ENT ROS: positive for - headaches, tinnitus, vertigo and visual changes  Allergy and Immunology ROS: negative  Hematological and Lymphatic ROS: negative  Endocrine ROS: negative  Respiratory ROS: no cough, shortness of breath, or wheezing  Cardiovascular ROS: no chest pain or dyspnea on exertion  Gastrointestinal ROS: no abdominal pain, change in bowel habits, or black or bloody stools  Genito-Urinary ROS: no dysuria, trouble voiding, or hematuria  Musculoskeletal ROS: positive for - gait disturbance, joint pain, joint stiffness, joint swelling, muscle pain, muscular weakness and pain in back - bilateral and leg - bilateral  Neurological ROS: positive for - dizziness, gait disturbance, headaches, numbness/tingling and weakness  Dermatological ROS: negative    Medications reviewed:  Current Outpatient Medications   Medication Sig Dispense Refill    lisinopril-hydroCHLOROthiazide (PRINZIDE, ZESTORETIC) 10-12.5 mg per tablet take 1 tablet by mouth once daily  0    scopolamine (TRANSDERM-SCOP) 1 mg over 3 days pt3d 1 Patch by TransDERmal route every seventy-two (72) hours. 10 Patch 2    pregabalin (LYRICA) 75 mg capsule Take 1 Cap by mouth two (2) times a day. Max Daily Amount: 150 mg. 60 Cap 1    verapamil ER (VERELAN) 120 mg ER capsule Take 1 Cap by mouth daily. 30 Cap 6    diazePAM (VALIUM) 10 mg tablet Take 1 Tab by mouth every twelve (12) hours as needed for Anxiety. Max Daily Amount: 20 mg. 60 Tab 3    nortriptyline (PAMELOR) 10 mg capsule Take 1 Cap by mouth nightly. 30 Cap 1    HYDROcodone-acetaminophen (NORCO) 7.5-325 mg per tablet Take 1 Tab by mouth every eight (8) hours as needed for Pain. Max Daily Amount: 3 Tabs. 15 Tab 0    valACYclovir (VALTREX) 1 gram tablet Take 1 Tab by mouth daily. 30 Tab 1    levothyroxine (SYNTHROID) 100 mcg tablet 120 mcg daily. 0    cholecalciferol, vitamin D3, (VITAMIN D3) 2,000 unit tab Take  by mouth daily.  TIOTROPIUM BROMIDE (SPIRIVA WITH HANDIHALER IN) Take 2 Puffs by inhalation daily.  aspirin delayed-release 81 mg tablet Take  by mouth daily.  hydroxychloroquine (PLAQUENIL) 200 mg tablet Take 200 mg by mouth two (2) times a day.  methotrexate (RHEUMATREX) 2.5 mg tablet Take  by mouth every Sunday.  6 capsules      folic acid (FOLVITE) 1 mg tablet Take  by mouth daily.  Oxygen nightly. 2 liters      diclofenac (VOLTAREN) 1 % gel   0    predniSONE (DELTASONE) 10 mg tablet Take 1 Tab by mouth two (2) times a day. 30 Tab 0    meclizine (ANTIVERT) 25 mg tablet Take 1 Tab by mouth nightly. 60 Tab 3        Objective:   Vitals:  Vitals:    11/21/18 0815   BP: 124/64   Pulse: 71   Resp: 18   Temp: 98.2 °F (36.8 °C)   TempSrc: Temporal   SpO2: 99%   Weight: 209 lb (94.8 kg)   Height: 5' 1\" (1.549 m)   PainSc:   2   PainLoc: Back     Lab Data Reviewed:  No results found for: WBC, HCT, HGB, PLT, HCTEXT, HGBEXT, PLTEXT    No results found for: NA, K, CL, CO2, GLU, BUN, CREA, CA    No components found for: TROPQUANT    No results found for: PAMELA      No results found for: HBA1C, HGBE8, YVA2FNFF, BIY4LHKE     Lab Results   Component Value Date/Time    Vitamin B12 426 09/11/2018 10:38 AM       No results found for: PAMELA, ANARX, ANAIGG, XBANA    No results found for: CHOL, CHOLPOCT, CHOLX, CHLST, CHOLV, HDL, HDLPOC, LDL, LDLCPOC, LDLC, DLDLP, VLDLC, VLDL, TGLX, TRIGL, TRIGP, TGLPOCT, CHHD, CHHDX      CT Results (recent):  No results found for this or any previous visit. MRI Results (recent):  Results from [place] encounter on 10/17/18   MRI LUMB SPINE WO CONT    Narrative EXAM:  MRI LUMB SPINE WO CONT    INDICATION:  radiculopathy, low back pain. Acute bilateral lower back pain. Bilateral leg and feet swelling. COMPARISON: None    TECHNIQUE: MR imaging of the lumbar spine was performed using the following  sequences: sagittal T1, T2, STIR;  axial T1, T2.     CONTRAST:  None. FINDINGS:    At T11-12 and T12-L1 as well as T10-11 there is bilateral facet arthropathy. The  central canal measures 10 mm anterior to posterior is minimum. Mild disc bulges  are present at T11-12 and T12-L1. There is normal alignment of the lumbar spine. Vertebral body heights are  maintained.  Marrow signal is normal.    The conus medullaris terminates at L2. Signal and caliber of the distal spinal  cord are within normal limits. The paraspinal soft tissues are within normal limits. L1-L2:  No herniation or stenosis. Broad disc bulge. Facet arthropathy is  asymmetric to the left. Central canal measures 12 mm anterior to posterior. No  significant neural foraminal stenosis (series 601, image 21). L2-L3:  No herniation or stenosis. Broad disc bulge. Bilateral facet  arthropathy. Central canal measures 13 mm anterior to posterior. Minimal  right-sided neural foraminal stenosis (axial image 16). L3-L4:  Broad disc bulge, bilateral ligamentum flavum hypertrophy and facet  arthropathy. Central canal measures 6.7 mm anterior to posterior. Mild bilateral  neural foraminal stenosis. (Series 601, image 12). L4-L5:  Broad disc bulge. Bilateral facet arthropathy. Central canal measures  12.1 mm anterior to posterior. Moderate bilateral neural foraminal stenosis. (Reference series 601, image 8). L5-S1:  No herniation or stenosis. Nonspecific edema in the soft tissues posterior to the L2, L3, and L4 levels. Impression IMPRESSION:    Multifactorial central canal stenosis L3-4  Moderate bilateral L5-S1 neural foraminal stenosis  Low-lying cord, without obvious fatty filum or mass. IR Results (recent):  No results found for this or any previous visit. VAS/US Results (recent):  No results found for this or any previous visit. PHYSICAL EXAM:  General:    Alert, cooperative, no distress, appears stated age. Head:   Normocephalic, without obvious abnormality, atraumatic. Eyes:   Conjunctivae/corneas clear. PERRLA  Nose:  Nares normal. No drainage or sinus tenderness. Throat:    Lips, mucosa, and tongue normal.  No Thrush  Neck:  Supple, symmetrical,  no adenopathy, thyroid: non tender    no carotid bruit and no JVD. Paraspinal tenderness  Back:    Symmetric,  bilateral tenderness.   Lungs:   Clear to auscultation bilaterally. No Wheezing or Rhonchi. No rales. Chest wall:  No tenderness or deformity. No Accessory muscle use. Heart:   Regular rate and rhythm,  no murmur, rub or gallop. Abdomen:   Soft, non-tender. Not distended. Bowel sounds normal. No masses  Extremities: Extremities normal, atraumatic, No cyanosis. No edema. No clubbing  Skin:     Texture, turgor normal. No rashes or lesions. Not Jaundiced  Lymph nodes: Cervical, supraclavicular normal.  Psych:  Good insight. Not depressed. Not anxious or agitated. NEUROLOGICAL EXAM:  Appearance: The patient is well developed, well nourished, provides a coherent history and is in no acute distress. Mental Status: Oriented to time, place and person. Mood and affect appropriate. Cranial Nerves:   Intact visual fields. Fundi are benign. GITA, EOM's full, no nystagmus, no ptosis. Facial sensation is normal. Corneal reflexes are intact. Facial movement is symmetric. Hearing is normal bilaterally. Palate is midline with normal sternocleidomastoid and trapezius muscles are normal. Tongue is midline. Motor:  5/5 strength in upper and 5-/5 lower proximal and distal muscles. Normal bulk and tone. No fasciculations. Reflexes:   Deep tendon reflexes 2+/4 and symmetrical.   Sensory:   Dysesthesia to touch, pinprick and vibration. Gait:  Normal gait. Tremor:   No tremor noted. Cerebellar:  No cerebellar signs present. Neurovascular:  Normal heart sounds and regular rhythm, peripheral pulses intact, and no carotid bruits. Assesment  1. Polyneuropathy  Continue management    2. Spinal stenosis of lumbar region, unspecified whether neurogenic claudication present  Physical therapy    3. Paresthesia  Stable    4. Dizziness  Stable    5. Myofascial muscle pain  Physical therapy    6.  Bilateral low back pain without sciatica, unspecified chronicity  Physical therapy    ___________________________________________________  PLAN:Medication  and plan reviewed with patient      ICD-10-CM ICD-9-CM    1. Polyneuropathy G62.9 356.9    2. Spinal stenosis of lumbar region, unspecified whether neurogenic claudication present M48.061 724.02    3. Paresthesia R20.2 782.0    4. Dizziness R42 780.4    5. Myofascial muscle pain M79.18 729.1    6.  Bilateral low back pain without sciatica, unspecified chronicity M54.5 724.2      Follow-up Disposition:  Return in about 3 months (around 2/21/2019).           ___________________________________________________    Total time spent with patient:  []15   []25   []35   [] __ minutes    Care Plan discussed with:    [x]Patient   []Family    []Care Manager   []Consultant/Specialist :    ___________________________________________________    Attending Physician: Grace Gómez MD

## 2018-11-21 NOTE — PROGRESS NOTES
Chief Complaint   Patient presents with    Neuropathy    Results     EMG     1. Have you been to the ER, urgent care clinic since your last visit? Hospitalized since your last visit? No    2. Have you seen or consulted any other health care providers outside of the [address] [place] [address] since your last visit? Include any pap smears or colon screening.  Dr. Hao Lacey

## 2018-11-26 ENCOUNTER — TELEPHONE (OUTPATIENT)
Dept: NEUROLOGY | Age: 78
End: 2018-11-26

## 2018-11-26 NOTE — TELEPHONE ENCOUNTER
Patient calling stating that she received a bill form [facility] stating that she is responsible for nearly $300 for her lab visit. Patient stated she has never had to pay for labwork ever with her insurance. Also, her pharmacy stated that the Rx written by Dr. Batool Shahid was not being covered in full as well. Stated it would be $200 a month for medication. Patient asked if we could resubmit or amend Rx so that Medicare will cover.

## 2018-11-28 ENCOUNTER — TELEPHONE (OUTPATIENT)
Dept: NEUROLOGY | Age: 78
End: 2018-11-28

## 2018-11-29 DIAGNOSIS — G62.9 NEUROPATHY: ICD-10-CM

## 2018-11-29 DIAGNOSIS — E53.8 FOLIC ACID DEFICIENCY: Primary | ICD-10-CM

## 2018-11-29 RX ORDER — FOLIC ACID 1 MG/1
1 TABLET ORAL DAILY
Qty: 30 TAB | Refills: 3 | Status: SHIPPED | OUTPATIENT
Start: 2018-11-29

## 2019-02-13 ENCOUNTER — OFFICE VISIT (OUTPATIENT)
Dept: NEUROLOGY | Age: 79
End: 2019-02-13

## 2019-02-13 VITALS
RESPIRATION RATE: 18 BRPM | SYSTOLIC BLOOD PRESSURE: 123 MMHG | OXYGEN SATURATION: 99 % | WEIGHT: 207.8 LBS | TEMPERATURE: 97.2 F | BODY MASS INDEX: 39.23 KG/M2 | HEIGHT: 61 IN | DIASTOLIC BLOOD PRESSURE: 82 MMHG | HEART RATE: 80 BPM

## 2019-02-13 DIAGNOSIS — G43.909 MIGRAINE WITHOUT STATUS MIGRAINOSUS, NOT INTRACTABLE, UNSPECIFIED MIGRAINE TYPE: ICD-10-CM

## 2019-02-13 DIAGNOSIS — G89.29 CHRONIC BILATERAL LOW BACK PAIN WITHOUT SCIATICA: ICD-10-CM

## 2019-02-13 DIAGNOSIS — G62.9 POLYNEUROPATHY: Primary | ICD-10-CM

## 2019-02-13 DIAGNOSIS — R21 RASH: ICD-10-CM

## 2019-02-13 DIAGNOSIS — M54.50 CHRONIC BILATERAL LOW BACK PAIN WITHOUT SCIATICA: ICD-10-CM

## 2019-02-13 DIAGNOSIS — R42 DIZZINESS: ICD-10-CM

## 2019-02-13 DIAGNOSIS — F41.9 ANXIETY: ICD-10-CM

## 2019-02-13 RX ORDER — DOXYCYCLINE HYCLATE 100 MG
TABLET ORAL
COMMUNITY
End: 2019-04-15 | Stop reason: ALTCHOICE

## 2019-02-13 RX ORDER — VERAPAMIL HYDROCHLORIDE 120 MG/1
120 CAPSULE, EXTENDED RELEASE ORAL DAILY
Qty: 30 CAP | Refills: 6 | Status: SHIPPED | OUTPATIENT
Start: 2019-02-13 | End: 2020-03-05

## 2019-02-13 RX ORDER — DIAZEPAM 10 MG/1
10 TABLET ORAL
Qty: 60 TAB | Refills: 3 | Status: SHIPPED | OUTPATIENT
Start: 2019-02-13 | End: 2019-07-15 | Stop reason: SDUPTHER

## 2019-02-13 RX ORDER — CLOBETASOL PROPIONATE 0.5 MG/G
CREAM TOPICAL
Refills: 0 | COMMUNITY
Start: 2019-02-11 | End: 2022-10-26

## 2019-02-13 NOTE — PROGRESS NOTES
Chief Complaint   Patient presents with    Neurologic Problem     Polyneuropathy    Medication Refill     1. Have you been to the ER, urgent care clinic since your last visit? Hospitalized since your last visit? [facility]    2. Have you seen or consulted any other health care providers outside of the [address] [place] [address] since your last visit? Include any pap smears or colon screening.  [facility]        Pill count =        Pill count verified with patient  Patient reports taking medication  Q   UDS obtained and sent =  Pain contract =  [facility] made available for             Review  Script given for

## 2019-02-13 NOTE — PROGRESS NOTES
Neurology Progress Note    NAME:  Alex Johnson   :   1940   MRN:   [id number]     Date/Time:  2019  Subjective: Alex Johnson is a 70 y.o. female here today for follow-up for pain, numbness or tingling sensation leg pain and back pain, anxiety, neuropathy rashes pain. Patient says she finished physical therapy, which has helped, however, she is still having back pain. At this time, I recommended that patient uses back brace, at least 3 hours in a day. She says she still having the back pain but not as much as before, back pain is continuous, sharp in nature, burning sensation that goes down to the legs causing weakness, numbness and tingling sensation of the left. She says she is also having leg pain, she says the pain is sometimes accompanied by heaviness of the legs, she will feel as if her legs are swollen, she however denies incontinence. Says her dizziness waxes and wanes, has not been very frequent, however when she experiences headache is always accompanied with dizziness. Says her back appears to be be swelling  and pain. Patient says she developed this rashes on the body, not sure what is coming from that is allergy. I asked patient to follow-up with her primary care physician to evaluate for rashes. I will obtain blood work for CK, aldolase, CBC with differential, ESR. Denies loss of consciousness, dysphagia or odynophagia.   Review of Systems - General ROS: positive for  - fatigue and sleep disturbance  Psychological ROS: positive for - anxiety, depression and sleep disturbances  Ophthalmic ROS: positive for - blurry vision and uses glasses  ENT ROS: positive for - headaches, tinnitus, vertigo and visual changes  Allergy and Immunology ROS: negative  Hematological and Lymphatic ROS: negative  Endocrine ROS: negative  Respiratory ROS: no cough, shortness of breath, or wheezing  Cardiovascular ROS: no chest pain or dyspnea on exertion  Gastrointestinal ROS: no abdominal pain, change in bowel habits, or black or bloody stools  Genito-Urinary ROS: no dysuria, trouble voiding, or hematuria  Musculoskeletal ROS: positive for - gait disturbance, joint pain, joint stiffness, joint swelling, muscle pain, muscular weakness and pain in back - bilateral and leg - bilateral  Neurological ROS: positive for - dizziness, gait disturbance, headaches, numbness/tingling and weakness  Dermatological ROS: negative    Medications reviewed:  Current Outpatient Medications   Medication Sig Dispense Refill    verapamil ER (VERELAN) 120 mg ER capsule Take 1 Cap by mouth daily. 30 Cap 6    diazePAM (VALIUM) 10 mg tablet Take 1 Tab by mouth every twelve (12) hours as needed for Anxiety. Max Daily Amount: 20 mg. 60 Tab 3    folic acid (FOLVITE) 1 mg tablet Take 1 Tab by mouth daily. 30 Tab 3    OTHER Lumbar brace  Lumbar stenosis 1 Units 0    lisinopril-hydroCHLOROthiazide (PRINZIDE, ZESTORETIC) 10-12.5 mg per tablet take 1 tablet by mouth once daily  0    scopolamine (TRANSDERM-SCOP) 1 mg over 3 days pt3d 1 Patch by TransDERmal route every seventy-two (72) hours. 10 Patch 2    nortriptyline (PAMELOR) 10 mg capsule Take 1 Cap by mouth nightly. 30 Cap 1    HYDROcodone-acetaminophen (NORCO) 7.5-325 mg per tablet Take 1 Tab by mouth every eight (8) hours as needed for Pain. Max Daily Amount: 3 Tabs. 15 Tab 0    valACYclovir (VALTREX) 1 gram tablet Take 1 Tab by mouth daily. 30 Tab 1    levothyroxine (SYNTHROID) 100 mcg tablet 120 mcg daily. 0    cholecalciferol, vitamin D3, (VITAMIN D3) 2,000 unit tab Take  by mouth daily.  TIOTROPIUM BROMIDE (SPIRIVA WITH HANDIHALER IN) Take 2 Puffs by inhalation daily.  aspirin delayed-release 81 mg tablet Take  by mouth daily.  hydroxychloroquine (PLAQUENIL) 200 mg tablet Take 200 mg by mouth two (2) times a day.  methotrexate (RHEUMATREX) 2.5 mg tablet Take  by mouth every Sunday. 6 capsules      Oxygen nightly.  2 liters      clobetasol (TEMOVATE) 0.05 % topical cream apply to affected area twice a day externally  0    doxycycline (VIBRA-TABS) 100 mg tablet doxycycline hyclate 100 mg tablet      levomefolate-B6-meB12-algal oil (METANX, ALGAL OIL,) 3 mg-35 mg-2 mg -90.314 mg cap Take 1 Cap by mouth daily. 30 Cap 3    diclofenac (VOLTAREN) 1 % gel   0    pregabalin (LYRICA) 75 mg capsule Take 1 Cap by mouth two (2) times a day. Max Daily Amount: 150 mg. 60 Cap 1    predniSONE (DELTASONE) 10 mg tablet Take 1 Tab by mouth two (2) times a day. 30 Tab 0    meclizine (ANTIVERT) 25 mg tablet Take 1 Tab by mouth nightly. 60 Tab 3        Objective:   Vitals:  Vitals:    02/13/19 1044   BP: 123/82   Pulse: 80   Resp: 18   Temp: 97.2 °F (36.2 °C)   TempSrc: Temporal   SpO2: 99%   Weight: 207 lb 12.8 oz (94.3 kg)   Height: 5' 1\" (1.549 m)   PainSc:   5   PainLoc: Back       Lab Data Reviewed:  Lab Results   Component Value Date/Time    WBC 9.0 02/13/2019 11:52 AM    HCT 40.8 02/13/2019 11:52 AM    HGB 13.1 02/13/2019 11:52 AM    PLATELET 705 45/25/7661 11:52 AM       No results found for: NA, K, CL, CO2, GLU, BUN, CREA, CA    No components found for: TROPQUANT    No results found for: PAMELA      No results found for: HBA1C, HGBE8, DHZ8CAVU, TPL1BDVX, NHT3BWFF     Lab Results   Component Value Date/Time    Vitamin B12 426 09/11/2018 10:38 AM       No results found for: PAMELA, ANARX, ANAIGG, XBANA    No results found for: CHOL, CHOLPOCT, CHOLX, CHLST, CHOLV, HDL, HDLPOC, LDL, LDLCPOC, LDLC, DLDLP, VLDLC, VLDL, TGLX, TRIGL, TRIGP, TGLPOCT, CHHD, CHHDX      CT Results (recent):  No results found for this or any previous visit. MRI Results (recent):  Results from [place] encounter on 10/17/18   MRI LUMB SPINE WO CONT    Narrative EXAM:  MRI LUMB SPINE WO CONT    INDICATION:  radiculopathy, low back pain. Acute bilateral lower back pain. Bilateral leg and feet swelling.     COMPARISON: None    TECHNIQUE: MR imaging of the lumbar spine was performed using the following  sequences: sagittal T1, T2, STIR;  axial T1, T2.     CONTRAST:  None. FINDINGS:    At T11-12 and T12-L1 as well as T10-11 there is bilateral facet arthropathy. The  central canal measures 10 mm anterior to posterior is minimum. Mild disc bulges  are present at T11-12 and T12-L1. There is normal alignment of the lumbar spine. Vertebral body heights are  maintained. Marrow signal is normal.    The conus medullaris terminates at L2. Signal and caliber of the distal spinal  cord are within normal limits. The paraspinal soft tissues are within normal limits. L1-L2:  No herniation or stenosis. Broad disc bulge. Facet arthropathy is  asymmetric to the left. Central canal measures 12 mm anterior to posterior. No  significant neural foraminal stenosis (series 601, image 21). L2-L3:  No herniation or stenosis. Broad disc bulge. Bilateral facet  arthropathy. Central canal measures 13 mm anterior to posterior. Minimal  right-sided neural foraminal stenosis (axial image 16). L3-L4:  Broad disc bulge, bilateral ligamentum flavum hypertrophy and facet  arthropathy. Central canal measures 6.7 mm anterior to posterior. Mild bilateral  neural foraminal stenosis. (Series 601, image 12). L4-L5:  Broad disc bulge. Bilateral facet arthropathy. Central canal measures  12.1 mm anterior to posterior. Moderate bilateral neural foraminal stenosis. (Reference series 601, image 8). L5-S1:  No herniation or stenosis. Nonspecific edema in the soft tissues posterior to the L2, L3, and L4 levels. Impression IMPRESSION:    Multifactorial central canal stenosis L3-4  Moderate bilateral L5-S1 neural foraminal stenosis  Low-lying cord, without obvious fatty filum or mass. IR Results (recent):  No results found for this or any previous visit. VAS/US Results (recent):  No results found for this or any previous visit.     PHYSICAL EXAM:  General:    Alert, cooperative, no distress, appears stated age. Head:   Normocephalic, without obvious abnormality, atraumatic. Eyes:   Conjunctivae/corneas clear. PERRLA  Nose:  Nares normal. No drainage or sinus tenderness. Throat:    Lips, mucosa, and tongue normal.  No Thrush  Neck:  Supple, symmetrical,  no adenopathy, thyroid: non tender    no carotid bruit and no JVD. Paraspinal tenderness  Back:    Symmetric, diffuse tenderness. Lungs:   Clear to auscultation bilaterally. No Wheezing or Rhonchi. No rales. Chest wall:  No tenderness or deformity. No Accessory muscle use. Heart:   Regular rate and rhythm,  no murmur, rub or gallop. Abdomen:   Soft, non-tender. Not distended. Bowel sounds normal. No masses  Extremities: Extremities normal, atraumatic, No cyanosis. No edema. No clubbing  Skin:     Texture, turgor normal. No rashes or lesions. Not Jaundiced  Lymph nodes: Cervical, supraclavicular normal.  Psych:  Good insight. Not depressed. Anxious . NEUROLOGICAL EXAM:  Appearance: The patient is well developed, well nourished, provides a coherent history and is in no acute distress. Mental Status: Oriented to time, place and person. Mood and affect appropriate. Cranial Nerves:   Intact visual fields. Fundi are benign. GITA, EOM's full, no nystagmus, no ptosis. Facial sensation is normal. Corneal reflexes are intact. Facial movement is symmetric. Hearing is normal bilaterally. Palate is midline with normal sternocleidomastoid and trapezius muscles are normal. Tongue is midline. Motor:   5- /5 strength in upper extremity and 4+/5 lower extremity r proximal and distal muscles. Normal bulk and tone. No fasciculations. Reflexes:   Deep tendon reflexes 1+/4 and symmetrical.   Sensory:    Decreased sensation to touch, pinprick and vibration bilateral lower extremity up to the knee upper extremity in glove distribution. Gait:   Slightly unsteady gait. Tremor:   No tremor noted. Cerebellar:  No cerebellar signs present. Neurovascular:  Normal heart sounds and regular rhythm, peripheral pulses intact, and no carotid bruits. Assesment  1. Migraine without status migrainosus, not intractable, unspecified migraine type    - verapamil ER (VERELAN) 120 mg ER capsule; Take 1 Cap by mouth daily. Dispense: 30 Cap; Refill: 6    2. Anxiety  - diazePAM (VALIUM) 10 mg tablet; Take 1 Tab by mouth every twelve (12) hours as needed for Anxiety. Max Daily Amount: 20 mg. Dispense: 60 Tab; Refill: 3    3. Dizziness    - diazePAM (VALIUM) 10 mg tablet; Take 1 Tab by mouth every twelve (12) hours as needed for Anxiety. Max Daily Amount: 20 mg. Dispense: 60 Tab; Refill: 3    4. Polyneuropathy  Continue management    5. Chronic bilateral low back pain without sciatica  Physical therapy    ___________________________________________________  PLAN: Medication plan discussed with patient      ICD-10-CM ICD-9-CM    1. Polyneuropathy G62.9 356.9 SED RATE, AUTOMATED      CK      ALDOLASE   2. Migraine without status migrainosus, not intractable, unspecified migraine type G43.909 346.90 verapamil ER (VERELAN) 120 mg ER capsule      CBC WITH AUTOMATED DIFF   3. Anxiety F41.9 300.00 diazePAM (VALIUM) 10 mg tablet   4. Dizziness R42 780.4 diazePAM (VALIUM) 10 mg tablet   5. Chronic bilateral low back pain without sciatica M54.5 724.2 CK    G89.29 338.29 ALDOLASE   6.  Rash R21 782.1 SED RATE, AUTOMATED      CBC WITH AUTOMATED DIFF     Follow-up Disposition:  Return in about 2 months (around 4/13/2019).           ___________________________________________________    Total time spent with patient:  []15   []25   []35   [] __ minutes    Care Plan discussed with:    []Patient   []Family    []Care Manager   []Consultant/Specialist :    ___________________________________________________    Attending Physician: Thompson Jade MD

## 2019-02-14 LAB
ALDOLASE SERPL-CCNC: 6.1 U/L (ref 3.3–10.3)
BASOPHILS # BLD AUTO: 0 X10E3/UL (ref 0–0.2)
BASOPHILS NFR BLD AUTO: 0 %
CK SERPL-CCNC: 39 U/L (ref 24–173)
EOSINOPHIL # BLD AUTO: 0.3 X10E3/UL (ref 0–0.4)
EOSINOPHIL NFR BLD AUTO: 3 %
ERYTHROCYTE [DISTWIDTH] IN BLOOD BY AUTOMATED COUNT: 14.4 % (ref 12.3–15.4)
ERYTHROCYTE [SEDIMENTATION RATE] IN BLOOD BY WESTERGREN METHOD: 22 MM/HR (ref 0–40)
HCT VFR BLD AUTO: 40.8 % (ref 34–46.6)
HGB BLD-MCNC: 13.1 G/DL (ref 11.1–15.9)
IMM GRANULOCYTES # BLD AUTO: 0.1 X10E3/UL (ref 0–0.1)
IMM GRANULOCYTES NFR BLD AUTO: 1 %
LYMPHOCYTES # BLD AUTO: 1.7 X10E3/UL (ref 0.7–3.1)
LYMPHOCYTES NFR BLD AUTO: 19 %
MCH RBC QN AUTO: 32.8 PG (ref 26.6–33)
MCHC RBC AUTO-ENTMCNC: 32.1 G/DL (ref 31.5–35.7)
MCV RBC AUTO: 102 FL (ref 79–97)
MONOCYTES # BLD AUTO: 1.2 X10E3/UL (ref 0.1–0.9)
MONOCYTES NFR BLD AUTO: 13 %
NEUTROPHILS # BLD AUTO: 5.7 X10E3/UL (ref 1.4–7)
NEUTROPHILS NFR BLD AUTO: 64 %
PLATELET # BLD AUTO: 268 X10E3/UL (ref 150–379)
RBC # BLD AUTO: 3.99 X10E6/UL (ref 3.77–5.28)
WBC # BLD AUTO: 9 X10E3/UL (ref 3.4–10.8)

## 2019-04-15 ENCOUNTER — OFFICE VISIT (OUTPATIENT)
Dept: NEUROLOGY | Age: 79
End: 2019-04-15

## 2019-04-15 VITALS
DIASTOLIC BLOOD PRESSURE: 77 MMHG | RESPIRATION RATE: 18 BRPM | HEART RATE: 73 BPM | SYSTOLIC BLOOD PRESSURE: 154 MMHG | WEIGHT: 213.8 LBS | TEMPERATURE: 98.1 F | BODY MASS INDEX: 40.37 KG/M2 | OXYGEN SATURATION: 97 % | HEIGHT: 61 IN

## 2019-04-15 DIAGNOSIS — M25.571 ACUTE BILATERAL ANKLE PAIN: ICD-10-CM

## 2019-04-15 DIAGNOSIS — R58 ECCHYMOSIS: ICD-10-CM

## 2019-04-15 DIAGNOSIS — G43.009 MIGRAINE WITHOUT AURA AND WITHOUT STATUS MIGRAINOSUS, NOT INTRACTABLE: ICD-10-CM

## 2019-04-15 DIAGNOSIS — Z79.891 USE OF OPIATES FOR THERAPEUTIC PURPOSES: ICD-10-CM

## 2019-04-15 DIAGNOSIS — R42 DIZZINESS: ICD-10-CM

## 2019-04-15 DIAGNOSIS — G62.9 POLYNEUROPATHY: Primary | ICD-10-CM

## 2019-04-15 DIAGNOSIS — M25.572 ACUTE BILATERAL ANKLE PAIN: ICD-10-CM

## 2019-04-15 NOTE — PROGRESS NOTES
Neurology Progress Note NAME:  Manuel Hernandez :   1940 MRN:   [id number] Date/Time:  4/15/2019 Subjective: Manuel Hernandez is a 70 y.o. female here today for follow-up for pain, numbness or tingling sensation leg pain and back pain, anxiety, neuropathy, test results. Patient was accompanied by her daughter to the visit. Patient says she has been doing fairly well since she finished physical therapy, which has helped, however, she is still having mild back pain. I told patient to continue using the back brace, at least 3 hours in a day. She says she still having the back pain but not as much as before, back pain is continuous, sharp in nature, burning sensation that goes down to the legs causing weakness, numbness and tingling sensation of the left. She says she is also having leg pain, she says the pain is sometimes accompanied by heaviness of the legs, but is much better. Says her dizziness waxes and wanes, has not been very frequent, however when she experiences headache is always accompanied with dizziness. Says her back appears to be be swelling  and pain. Patient says she developed this rashes on the body, not sure what is coming from that is allergy. Blood work was reviewed with patient which was unremarkable, no evidence of inflammation to cause the swelling in the legs. Denies loss of consciousness, dysphagia or odynophagia. Review of Systems - General ROS: positive for  - fatigue and sleep disturbance Psychological ROS: positive for - anxiety, depression and sleep disturbances Ophthalmic ROS: positive for - blurry vision and uses glasses ENT ROS: positive for - headaches, tinnitus, vertigo and visual changes Allergy and Immunology ROS: negative Hematological and Lymphatic ROS: negative Endocrine ROS: negative Respiratory ROS: no cough, shortness of breath, or wheezing Cardiovascular ROS: no chest pain or dyspnea on exertion Gastrointestinal ROS: no abdominal pain, change in bowel habits, or black or bloody stools Genito-Urinary ROS: no dysuria, trouble voiding, or hematuria Musculoskeletal ROS: positive for - gait disturbance, joint pain, joint stiffness, joint swelling, muscle pain, muscular weakness and pain in back - bilateral and leg - bilateral 
Neurological ROS: positive for - dizziness, gait disturbance, headaches, numbness/tingling and weakness Dermatological ROS: negative Medications reviewed: 
Current Outpatient Medications Medication Sig Dispense Refill  verapamil ER (VERELAN) 120 mg ER capsule Take 1 Cap by mouth daily. 30 Cap 6  
 diazePAM (VALIUM) 10 mg tablet Take 1 Tab by mouth every twelve (12) hours as needed for Anxiety. Max Daily Amount: 20 mg. 60 Tab 3  clobetasol (TEMOVATE) 0.05 % topical cream apply to affected area twice a day externally  0  
 folic acid (FOLVITE) 1 mg tablet Take 1 Tab by mouth daily. 30 Tab 3  
 OTHER Lumbar brace Lumbar stenosis 1 Units 0  
 scopolamine (TRANSDERM-SCOP) 1 mg over 3 days pt3d 1 Patch by TransDERmal route every seventy-two (72) hours. 10 Patch 2  
 valACYclovir (VALTREX) 1 gram tablet Take 1 Tab by mouth daily. 30 Tab 1  
 levothyroxine (SYNTHROID) 100 mcg tablet 120 mcg daily. 0  
 cholecalciferol, vitamin D3, (VITAMIN D3) 2,000 unit tab Take  by mouth daily.  TIOTROPIUM BROMIDE (SPIRIVA WITH HANDIHALER IN) Take 2 Puffs by inhalation daily.  aspirin delayed-release 81 mg tablet Take  by mouth daily.  hydroxychloroquine (PLAQUENIL) 200 mg tablet Take 200 mg by mouth two (2) times a day.  methotrexate (RHEUMATREX) 2.5 mg tablet Take  by mouth every Sunday. 6 capsules  Oxygen nightly. 2 liters  levomefolate-B6-meB12-algal oil (METANX, ALGAL OIL,) 3 mg-35 mg-2 mg -90.314 mg cap Take 1 Cap by mouth daily.  30 Cap 3  
 lisinopril-hydroCHLOROthiazide (PRINZIDE, ZESTORETIC) 10-12.5 mg per tablet take 1 tablet by mouth once daily  0  
 diclofenac (VOLTAREN) 1 % gel   0  pregabalin (LYRICA) 75 mg capsule Take 1 Cap by mouth two (2) times a day. Max Daily Amount: 150 mg. 60 Cap 1  
 nortriptyline (PAMELOR) 10 mg capsule Take 1 Cap by mouth nightly. 30 Cap 1  
 HYDROcodone-acetaminophen (NORCO) 7.5-325 mg per tablet Take 1 Tab by mouth every eight (8) hours as needed for Pain. Max Daily Amount: 3 Tabs. 15 Tab 0  predniSONE (DELTASONE) 10 mg tablet Take 1 Tab by mouth two (2) times a day. 30 Tab 0  
 meclizine (ANTIVERT) 25 mg tablet Take 1 Tab by mouth nightly. 60 Tab 3 Objective:  
Vitals: 
Vitals:  
 04/15/19 1114 BP: 154/77 Pulse: 73 Resp: 18 Temp: 98.1 °F (36.7 °C) TempSrc: Temporal  
SpO2: 97% Weight: 213 lb 12.8 oz (97 kg) Height: 5' 1\" (1.549 m) PainSc:   2 PainLoc: Leg Lab Data Reviewed: 
Lab Results Component Value Date/Time WBC 9.0 02/13/2019 11:52 AM  
 HCT 40.8 02/13/2019 11:52 AM  
 HGB 13.1 02/13/2019 11:52 AM  
 PLATELET 235 09/79/6125 11:52 AM  
 
 
No results found for: NA, K, CL, CO2, GLU, BUN, CREA, CA No components found for: TROPQUANT No results found for: PAMELA No results found for: HBA1C, HGBE8, RRO7EPCC, GNN8CPCA Lab Results Component Value Date/Time Vitamin B12 426 09/11/2018 10:38 AM  
 
 
No results found for: PAMELA, Corie Benes No results found for: CHOL, CHOLPOCT, CHOLX, CHLST, CHOLV, HDL, HDLPOC, LDL, LDLCPOC, LDLC, DLDLP, VLDLC, VLDL, TGLX, TRIGL, TRIGP, TGLPOCT, CHHD, CHHDX 
 
 
CT Results (recent): No results found for this or any previous visit. MRI Results (recent): 
Results from Hospital Encounter encounter on 10/17/18 MRI LUMB SPINE WO CONT Narrative EXAM:  MRI LUMB SPINE WO CONT INDICATION:  radiculopathy, low back pain. Acute bilateral lower back pain. Bilateral leg and feet swelling. COMPARISON: None TECHNIQUE: MR imaging of the lumbar spine was performed using the following 
sequences: sagittal T1, T2, STIR;  axial T1, T2.  
 
CONTRAST:  None. FINDINGS: 
 
At T11-12 and T12-L1 as well as T10-11 there is bilateral facet arthropathy. The 
central canal measures 10 mm anterior to posterior is minimum. Mild disc bulges 
are present at T11-12 and T12-L1. There is normal alignment of the lumbar spine. Vertebral body heights are 
maintained. Marrow signal is normal. 
 
The conus medullaris terminates at L2. Signal and caliber of the distal spinal 
cord are within normal limits. The paraspinal soft tissues are within normal limits. L1-L2:  No herniation or stenosis. Broad disc bulge. Facet arthropathy is 
asymmetric to the left. Central canal measures 12 mm anterior to posterior. No 
significant neural foraminal stenosis (series 601, image 21). L2-L3:  No herniation or stenosis. Broad disc bulge. Bilateral facet 
arthropathy. Central canal measures 13 mm anterior to posterior. Minimal 
right-sided neural foraminal stenosis (axial image 16). L3-L4:  Broad disc bulge, bilateral ligamentum flavum hypertrophy and facet 
arthropathy. Central canal measures 6.7 mm anterior to posterior. Mild bilateral 
neural foraminal stenosis. (Series 601, image 12). L4-L5:  Broad disc bulge. Bilateral facet arthropathy. Central canal measures 12.1 mm anterior to posterior. Moderate bilateral neural foraminal stenosis. (Reference series 601, image 8). L5-S1:  No herniation or stenosis. Nonspecific edema in the soft tissues posterior to the L2, L3, and L4 levels. Impression IMPRESSION: 
 
Multifactorial central canal stenosis L3-4 Moderate bilateral L5-S1 neural foraminal stenosis Low-lying cord, without obvious fatty filum or mass. IR Results (recent): No results found for this or any previous visit. VAS/US Results (recent): No results found for this or any previous visit.  
 
PHYSICAL EXAM: 
 General:    Alert, cooperative, no distress, appears stated age. Head:   Normocephalic, without obvious abnormality, atraumatic. Eyes:   Conjunctivae/corneas clear. PERRLA Nose:  Nares normal. No drainage or sinus tenderness. Throat:    Lips, mucosa, and tongue normal.  No Thrush Neck:  Supple, symmetrical,  no adenopathy, thyroid: non tender 
  no carotid bruit and no JVD. Paraspinal tenderness Back:    Symmetric, bilateral tenderness. Lungs:   Clear to auscultation bilaterally. No Wheezing or Rhonchi. No rales. Chest wall:  No tenderness or deformity. No Accessory muscle use. Heart:   Regular rate and rhythm,  no murmur, rub or gallop. Abdomen:   Soft, non-tender. Not distended. Bowel sounds normal. No masses Extremities: Extremities normal, atraumatic, No cyanosis. No edema. No clubbing Skin:     Texture, turgor normal. No rashes or lesions. Not Jaundiced Lymph nodes: Cervical, supraclavicular normal. 
Psych:  Good insight. Not depressed. Not anxious or agitated. NEUROLOGICAL EXAM: 
Appearance: The patient is well developed, well nourished, provides a coherent history and is in no acute distress. Mental Status: Oriented to time, place and person. Mood and affect appropriate. Cranial Nerves:   Intact visual fields. Fundi are benign. GITA, EOM's full, no nystagmus, no ptosis. Facial sensation is normal. Corneal reflexes are intact. Facial movement is symmetric. Hearing is normal bilaterally. Palate is midline with normal sternocleidomastoid and trapezius muscles are normal. Tongue is midline. Motor:  5/5 strength in upper extremity and 5-5 lower extremity proximal and distal muscles. Normal bulk and tone. No fasciculations. Reflexes:   Deep tendon reflexes 2+/4 and symmetrical.  
Sensory:    Dysesthesia to touch, pinprick and vibration. Gait:  Normal gait. Tremor:   No tremor noted. Cerebellar:  No cerebellar signs present. Neurovascular:  Normal heart sounds and regular rhythm, peripheral pulses intact, and no carotid bruits. Assesment 1. Polyneuropathy Continue management 2. Migraine without aura and without status migrainosus, not intractable Continue management 3. Acute bilateral ankle pain Physical therapy 4. Dizziness Continue management 5. Ecchymosis Follow-up with primary care physician 
 
___________________________________________________ PLAN: Medication and plan discussed with patient ICD-10-CM ICD-9-CM 1. Polyneuropathy G62.9 356.9 2. Migraine without aura and without status migrainosus, not intractable G43.009 346.10   
3. Acute bilateral ankle pain M25.571 719.47   
 M25.572 338.19   
4. Dizziness R42 780.4 5. Ecchymosis R58 459.89 Follow-up and Dispositions · Return in about 3 months (around 7/15/2019). 
  
 
 
 
  
___________________________________________________ Total time spent with patient:  []15   []25   []35   [] __ minutes Care Plan discussed with: 
  []Patient   []Family    []Care Manager   []Consultant/Specialist : 
 
___________________________________________________ Attending Physician: Marli Richard MD

## 2019-04-15 NOTE — PROGRESS NOTES
Chief Complaint Patient presents with  Neurologic Problem Polyneuropathy 1. Have you been to the ER, urgent care clinic since your last visit? Hospitalized since your last visit? no 
 
2. Have you seen or consulted any other health care providers outside of the [address] [place] [address] since your last visit? Include any pap smears or colon screening. No 
 
Pill count =       
Pill count verified with patient Patient reports taking medication  Q  
UDS obtained and sent = 
Pain contract = 
[facility] made available for             Review Script given for

## 2019-04-20 LAB — DRUGS UR: NORMAL

## 2019-07-15 ENCOUNTER — OFFICE VISIT (OUTPATIENT)
Dept: NEUROLOGY | Age: 79
End: 2019-07-15

## 2019-07-15 VITALS
HEART RATE: 68 BPM | BODY MASS INDEX: 39.69 KG/M2 | SYSTOLIC BLOOD PRESSURE: 120 MMHG | WEIGHT: 210.2 LBS | RESPIRATION RATE: 18 BRPM | HEIGHT: 61 IN | OXYGEN SATURATION: 95 % | DIASTOLIC BLOOD PRESSURE: 78 MMHG | TEMPERATURE: 97.4 F

## 2019-07-15 DIAGNOSIS — R42 DIZZINESS: ICD-10-CM

## 2019-07-15 DIAGNOSIS — G89.29 CHRONIC BILATERAL LOW BACK PAIN WITHOUT SCIATICA: ICD-10-CM

## 2019-07-15 DIAGNOSIS — F41.9 ANXIETY: ICD-10-CM

## 2019-07-15 DIAGNOSIS — M54.50 CHRONIC BILATERAL LOW BACK PAIN WITHOUT SCIATICA: ICD-10-CM

## 2019-07-15 DIAGNOSIS — M25.572 BILATERAL ANKLE PAIN, UNSPECIFIED CHRONICITY: ICD-10-CM

## 2019-07-15 DIAGNOSIS — G62.9 POLYNEUROPATHY: Primary | ICD-10-CM

## 2019-07-15 DIAGNOSIS — G43.009 MIGRAINE WITHOUT AURA AND WITHOUT STATUS MIGRAINOSUS, NOT INTRACTABLE: ICD-10-CM

## 2019-07-15 DIAGNOSIS — M25.571 BILATERAL ANKLE PAIN, UNSPECIFIED CHRONICITY: ICD-10-CM

## 2019-07-15 RX ORDER — DIAZEPAM 10 MG/1
10 TABLET ORAL
Qty: 60 TAB | Refills: 3 | Status: SHIPPED | OUTPATIENT
Start: 2019-07-15 | End: 2020-01-13 | Stop reason: SDUPTHER

## 2019-07-15 NOTE — PROGRESS NOTES
Neurology Progress Note    NAME:  Shannan Oro   :   1940   MRN:   [id number]     Date/Time:  7/15/2019  Subjective: Shannan Oro is a 70 y.o. female here today for follow-up for pain, numbness or tingling sensation leg pain and back pain, anxiety, neuropathy, dizziness. Patient noted some ankle pain right was in the but her back pain has improved with the physical therapy back brace. Patient advised to follow-up with podiatrist.  She says she still having the back pain but not as much as before, back pain is intermittent, sharp in nature, burning sensation that goes down to the legs causing weakness, numbness and tingling sensation of the left. Says her dizziness waxes and wanes, has not been very frequent, however when she experiences headache is always accompanied with dizziness. Denies loss of consciousness, dysphagia or odynophagia.   Review of Systems - General ROS: positive for  - fatigue and sleep disturbance  Psychological ROS: positive for - anxiety, depression and sleep disturbances  Ophthalmic ROS: positive for - blurry vision and uses glasses  ENT ROS: positive for - headaches, tinnitus, vertigo and visual changes  Allergy and Immunology ROS: negative  Hematological and Lymphatic ROS: negative  Endocrine ROS: negative  Respiratory ROS: no cough, shortness of breath, or wheezing  Cardiovascular ROS: no chest pain or dyspnea on exertion  Gastrointestinal ROS: no abdominal pain, change in bowel habits, or black or bloody stools  Genito-Urinary ROS: no dysuria, trouble voiding, or hematuria  Musculoskeletal ROS: positive for - gait disturbance, joint pain, joint stiffness, joint swelling, muscle pain, muscular weakness and pain in back - bilateral and leg - bilateral  Neurological ROS: positive for - dizziness, gait disturbance, headaches, numbness/tingling and weakness  Dermatological ROS: negative      Medications reviewed:  Current Outpatient Medications   Medication Sig Dispense Refill  verapamil ER (VERELAN) 120 mg ER capsule Take 1 Cap by mouth daily. 30 Cap 6    diazePAM (VALIUM) 10 mg tablet Take 1 Tab by mouth every twelve (12) hours as needed for Anxiety. Max Daily Amount: 20 mg. 60 Tab 3    folic acid (FOLVITE) 1 mg tablet Take 1 Tab by mouth daily. 30 Tab 3    OTHER Lumbar brace  Lumbar stenosis 1 Units 0    levothyroxine (SYNTHROID) 75 mcg tablet Take  by mouth daily. 0    cholecalciferol, vitamin D3, (VITAMIN D3) 2,000 unit tab Take  by mouth daily.  TIOTROPIUM BROMIDE (SPIRIVA WITH HANDIHALER IN) Take 2 Puffs by inhalation daily.  aspirin delayed-release 81 mg tablet Take  by mouth daily.  hydroxychloroquine (PLAQUENIL) 200 mg tablet Take 200 mg by mouth two (2) times a day.  methotrexate (RHEUMATREX) 2.5 mg tablet Take  by mouth every Sunday. 6 capsules      Oxygen nightly. 2 liters      clobetasol (TEMOVATE) 0.05 % topical cream apply to affected area twice a day externally  0    levomefolate-B6-meB12-algal oil (METANX, ALGAL OIL,) 3 mg-35 mg-2 mg -90.314 mg cap Take 1 Cap by mouth daily. 30 Cap 3    lisinopril-hydroCHLOROthiazide (PRINZIDE, ZESTORETIC) 10-12.5 mg per tablet take 1 tablet by mouth once daily  0    diclofenac (VOLTAREN) 1 % gel   0    scopolamine (TRANSDERM-SCOP) 1 mg over 3 days pt3d 1 Patch by TransDERmal route every seventy-two (72) hours. 10 Patch 2    pregabalin (LYRICA) 75 mg capsule Take 1 Cap by mouth two (2) times a day. Max Daily Amount: 150 mg. 60 Cap 1    nortriptyline (PAMELOR) 10 mg capsule Take 1 Cap by mouth nightly. 30 Cap 1    HYDROcodone-acetaminophen (NORCO) 7.5-325 mg per tablet Take 1 Tab by mouth every eight (8) hours as needed for Pain. Max Daily Amount: 3 Tabs. 15 Tab 0    predniSONE (DELTASONE) 10 mg tablet Take 1 Tab by mouth two (2) times a day. 30 Tab 0    valACYclovir (VALTREX) 1 gram tablet Take 1 Tab by mouth daily. 30 Tab 1    meclizine (ANTIVERT) 25 mg tablet Take 1 Tab by mouth nightly.  60 Tab 3        Objective:   Vitals:  Vitals:    07/15/19 1139   BP: 120/78   Pulse: 68   Resp: 18   Temp: 97.4 °F (36.3 °C)   TempSrc: Temporal   SpO2: 95%   Weight: 210 lb 3.2 oz (95.3 kg)   Height: 5' 1\" (1.549 m)   PainSc:   0 - No pain       Lab Data Reviewed:  Lab Results   Component Value Date/Time    WBC 9.0 02/13/2019 11:52 AM    HCT 40.8 02/13/2019 11:52 AM    HGB 13.1 02/13/2019 11:52 AM    PLATELET 523 52/86/6923 11:52 AM       No results found for: NA, K, CL, CO2, GLU, BUN, CREA, CA    No components found for: TROPQUANT    No results found for: PAMELA      No results found for: HBA1C, HGBE8, GLH4JDQE, BXZ7PMCO     Lab Results   Component Value Date/Time    Vitamin B12 426 09/11/2018 10:38 AM       No results found for: PAMELA, ANARX, ANAIGG, XBANA    No results found for: CHOL, CHOLPOCT, CHOLX, CHLST, CHOLV, HDL, HDLPOC, LDL, LDLCPOC, LDLC, DLDLP, VLDLC, VLDL, TGLX, TRIGL, TRIGP, TGLPOCT, CHHD, CHHDX      CT Results (recent):  No results found for this or any previous visit. MRI Results (recent):  Results from [place] encounter on 10/17/18   MRI LUMB SPINE WO CONT    Narrative EXAM:  MRI LUMB SPINE WO CONT    INDICATION:  radiculopathy, low back pain. Acute bilateral lower back pain. Bilateral leg and feet swelling. COMPARISON: None    TECHNIQUE: MR imaging of the lumbar spine was performed using the following  sequences: sagittal T1, T2, STIR;  axial T1, T2.     CONTRAST:  None. FINDINGS:    At T11-12 and T12-L1 as well as T10-11 there is bilateral facet arthropathy. The  central canal measures 10 mm anterior to posterior is minimum. Mild disc bulges  are present at T11-12 and T12-L1. There is normal alignment of the lumbar spine. Vertebral body heights are  maintained. Marrow signal is normal.    The conus medullaris terminates at L2. Signal and caliber of the distal spinal  cord are within normal limits. The paraspinal soft tissues are within normal limits.     L1-L2:  No herniation or stenosis. Broad disc bulge. Facet arthropathy is  asymmetric to the left. Central canal measures 12 mm anterior to posterior. No  significant neural foraminal stenosis (series 601, image 21). L2-L3:  No herniation or stenosis. Broad disc bulge. Bilateral facet  arthropathy. Central canal measures 13 mm anterior to posterior. Minimal  right-sided neural foraminal stenosis (axial image 16). L3-L4:  Broad disc bulge, bilateral ligamentum flavum hypertrophy and facet  arthropathy. Central canal measures 6.7 mm anterior to posterior. Mild bilateral  neural foraminal stenosis. (Series 601, image 12). L4-L5:  Broad disc bulge. Bilateral facet arthropathy. Central canal measures  12.1 mm anterior to posterior. Moderate bilateral neural foraminal stenosis. (Reference series 601, image 8). L5-S1:  No herniation or stenosis. Nonspecific edema in the soft tissues posterior to the L2, L3, and L4 levels. Impression IMPRESSION:    Multifactorial central canal stenosis L3-4  Moderate bilateral L5-S1 neural foraminal stenosis  Low-lying cord, without obvious fatty filum or mass. IR Results (recent):  No results found for this or any previous visit. VAS/US Results (recent):  No results found for this or any previous visit. PHYSICAL EXAM:  General:    Alert, cooperative, no distress, appears stated age. Head:   Normocephalic, without obvious abnormality, atraumatic. Eyes:   Conjunctivae/corneas clear. PERRLA  Nose:  Nares normal. No drainage or sinus tenderness. Throat:    Lips, mucosa, and tongue normal.  No Thrush  Neck:  Supple, symmetrical,  no adenopathy, thyroid: non tender    no carotid bruit and no JVD. Back:    Symmetric, bilateral tenderness. Lungs:   Clear to auscultation bilaterally. No Wheezing or Rhonchi. No rales. Chest wall:  No tenderness or deformity. No Accessory muscle use. Heart:   Regular rate and rhythm,  no murmur, rub or gallop. Abdomen:   Soft, non-tender.  Not distended. Bowel sounds normal. No masses  Extremities: Extremities normal, atraumatic, No cyanosis. No edema. No clubbing  Skin:     Texture, turgor normal. No rashes or lesions. Not Jaundiced  Lymph nodes: Cervical, supraclavicular normal.  Psych:  Good insight. Not depressed. Anxious. NEUROLOGICAL EXAM:  Appearance: The patient is well developed, well nourished, provides a coherent history and is in no acute distress. Mental Status: Oriented to time, place and person. Mood and affect appropriate. Cranial Nerves:   Intact visual fields. Fundi are benign. GITA, EOM's full, no nystagmus, no ptosis. Facial sensation is normal. Corneal reflexes are intact. Facial movement is symmetric. Hearing is normal bilaterally. Palate is midline with normal sternocleidomastoid and trapezius muscles are normal. Tongue is midline. Motor:  5/5 strength in upper and lower proximal and distal muscles. Normal bulk and tone. No fasciculations. Reflexes:   Deep tendon reflexes 2+/4 and symmetrical.   Sensory:    Dysesthesia to touch, pinprick and vibration. Gait:  Normal gait. Tremor:    Mild tremor noted. Cerebellar:  No cerebellar signs present. Neurovascular:  Normal heart sounds and regular rhythm, peripheral pulses intact, and no carotid bruits. Assesment  1. Anxiety    - diazePAM (VALIUM) 10 mg tablet; Take 1 Tab by mouth every twelve (12) hours as needed for Anxiety. Max Daily Amount: 20 mg. Dispense: 60 Tab; Refill: 3    2. Dizziness    - diazePAM (VALIUM) 10 mg tablet; Take 1 Tab by mouth every twelve (12) hours as needed for Anxiety. Max Daily Amount: 20 mg. Dispense: 60 Tab; Refill: 3    3. Polyneuropathy  Continue management    4. Migraine without aura and without status migrainosus, not intractable  Stable    5. Chronic bilateral low back pain without sciatica  Continue mangement    6.  Bilateral ankle pain, unspecified chronicity  Refer to Podiatrist    ___________________________________________________  PLAN:Medication and plan discussed with patient      ICD-10-CM ICD-9-CM    1. Polyneuropathy G62.9 356.9    2. Anxiety F41.9 300.00 diazePAM (VALIUM) 10 mg tablet   3. Dizziness R42 780.4 diazePAM (VALIUM) 10 mg tablet   4. Migraine without aura and without status migrainosus, not intractable G43.009 346.10    5. Chronic bilateral low back pain without sciatica M54.5 724.2     G89.29 338.29    6.  Bilateral ankle pain, unspecified chronicity M25.571 719.47     M25.572             ___________________________________________________    Attending Physician: Peggy Louie MD

## 2019-07-15 NOTE — PROGRESS NOTES
Chief Complaint   Patient presents with    Neurologic Problem     Polyneuropathy    Medication Refill     1. Have you been to the ER, urgent care clinic since your last visit? Hospitalized since your last visit? Urgent Care    2. Have you seen or consulted any other health care providers outside of the [address] [place] [address] since your last visit? Include any pap smears or colon screening.  Urgent Care      Pill count not performed patient did not bring medications        Pill count not verified with patient  Patient reports taking medication    UDS obtained and sent 4/15/19  Pain contract on file   made available for Dr. Gianfranco Rodriguez  review  Script given for Valium

## 2019-08-09 ENCOUNTER — TELEPHONE (OUTPATIENT)
Dept: NEUROLOGY | Age: 79
End: 2019-08-09

## 2019-08-09 NOTE — TELEPHONE ENCOUNTER
Spoke with the patient she stated needed to speak with Dr. Sukhjinder Santiago, tried to ask if there was anything that the nurse could help her with. Patient stated she wanted to speak with Dr. Sukhjinder Santiago only. Please call.

## 2019-08-14 RX ORDER — FUROSEMIDE 40 MG/1
40 TABLET ORAL DAILY
Qty: 30 TAB | Refills: 1 | Status: CANCELLED | OUTPATIENT
Start: 2019-08-14

## 2019-10-14 ENCOUNTER — OFFICE VISIT (OUTPATIENT)
Dept: NEUROLOGY | Age: 79
End: 2019-10-14

## 2019-10-14 VITALS
OXYGEN SATURATION: 93 % | SYSTOLIC BLOOD PRESSURE: 128 MMHG | HEART RATE: 67 BPM | BODY MASS INDEX: 39.8 KG/M2 | TEMPERATURE: 98.1 F | WEIGHT: 210.8 LBS | HEIGHT: 61 IN | DIASTOLIC BLOOD PRESSURE: 74 MMHG | RESPIRATION RATE: 18 BRPM

## 2019-10-14 DIAGNOSIS — M54.50 CHRONIC BILATERAL LOW BACK PAIN WITHOUT SCIATICA: ICD-10-CM

## 2019-10-14 DIAGNOSIS — G43.009 MIGRAINE WITHOUT AURA AND WITHOUT STATUS MIGRAINOSUS, NOT INTRACTABLE: ICD-10-CM

## 2019-10-14 DIAGNOSIS — F41.1 GAD (GENERALIZED ANXIETY DISORDER): ICD-10-CM

## 2019-10-14 DIAGNOSIS — R20.2 PARESTHESIA: ICD-10-CM

## 2019-10-14 DIAGNOSIS — Z79.891 USE OF OPIATES FOR THERAPEUTIC PURPOSES: ICD-10-CM

## 2019-10-14 DIAGNOSIS — G62.9 POLYNEUROPATHY: Primary | ICD-10-CM

## 2019-10-14 DIAGNOSIS — G89.29 CHRONIC BILATERAL LOW BACK PAIN WITHOUT SCIATICA: ICD-10-CM

## 2019-10-14 DIAGNOSIS — G45.9 TIA (TRANSIENT ISCHEMIC ATTACK): ICD-10-CM

## 2019-10-14 DIAGNOSIS — M35.9 AUTOIMMUNE DISEASE (HCC): ICD-10-CM

## 2019-10-14 NOTE — PROGRESS NOTES
Chief Complaint   Patient presents with    Neurologic Problem     Polyneuropathy     1. Have you been to the ER, urgent care clinic since your last visit? Hospitalized since your last visit? Urgent Care    2. Have you seen or consulted any other health care providers outside of the [address] [place] [address] since your last visit? Include any pap smears or colon screening.  Urgent Care

## 2019-10-14 NOTE — PROGRESS NOTES
Neurology Progress Note    NAME:  Cristiane Gonzales   :   1940   MRN:   [id number]     Date/Time:  10/14/2019  Subjective: Cristiane Gonzales is a 70 y.o. female here today for follow-up for pain, numbness or tingling sensation leg pain and back pain, anxiety, neuropathy, dizziness. Patient was accompanied by daughter to the visit. Says she is doing fairly well in terms of her health, however, she started to get to the loss of her . Due to her other medical conditions, patient has disability papers to be filled out. She says she still having the back pain but not as much as before, back pain is intermittent, sharp in nature, burning sensation that goes down to the legs causing weakness, numbness and tingling sensation of the left. Says her dizziness waxes and wanes, has not been very frequent, however when she experiences headache is always accompanied with dizziness. Denies loss of consciousness, dysphagia or odynophagia.   Review of Systems - General ROS: positive for  - fatigue and sleep disturbance  Psychological ROS: positive for - anxiety, depression and sleep disturbances  Ophthalmic ROS: positive for - blurry vision and uses glasses  ENT ROS: positive for - headaches, tinnitus, vertigo and visual changes  Allergy and Immunology ROS: negative  Hematological and Lymphatic ROS: negative  Endocrine ROS: negative  Respiratory ROS: no cough, shortness of breath, or wheezing  Cardiovascular ROS: no chest pain or dyspnea on exertion  Gastrointestinal ROS: no abdominal pain, change in bowel habits, or black or bloody stools  Genito-Urinary ROS: no dysuria, trouble voiding, or hematuria  Musculoskeletal ROS: positive for - gait disturbance, joint pain, joint stiffness, joint swelling, muscle pain, muscular weakness and pain in back - bilateral and leg - bilateral  Neurological ROS: positive for - dizziness, gait disturbance, headaches, numbness/tingling and weakness  Dermatological ROS: negative    Medications reviewed:  Current Outpatient Medications   Medication Sig Dispense Refill    diazePAM (VALIUM) 10 mg tablet Take 1 Tab by mouth every twelve (12) hours as needed for Anxiety. Max Daily Amount: 20 mg. 60 Tab 3    verapamil ER (VERELAN) 120 mg ER capsule Take 1 Cap by mouth daily. 30 Cap 6    folic acid (FOLVITE) 1 mg tablet Take 1 Tab by mouth daily. 30 Tab 3    OTHER Lumbar brace  Lumbar stenosis 1 Units 0    levothyroxine (SYNTHROID) 75 mcg tablet Take  by mouth daily. 0    cholecalciferol, vitamin D3, (VITAMIN D3) 2,000 unit tab Take  by mouth daily.  TIOTROPIUM BROMIDE (SPIRIVA WITH HANDIHALER IN) Take 2 Puffs by inhalation daily.  aspirin delayed-release 81 mg tablet Take  by mouth daily.  hydroxychloroquine (PLAQUENIL) 200 mg tablet Take 200 mg by mouth two (2) times a day.  methotrexate (RHEUMATREX) 2.5 mg tablet Take  by mouth every Sunday. 6 capsules      Oxygen nightly. 2 liters      clobetasol (TEMOVATE) 0.05 % topical cream apply to affected area twice a day externally  0    levomefolate-B6-meB12-algal oil (METANX, ALGAL OIL,) 3 mg-35 mg-2 mg -90.314 mg cap Take 1 Cap by mouth daily. 30 Cap 3    lisinopril-hydroCHLOROthiazide (PRINZIDE, ZESTORETIC) 10-12.5 mg per tablet take 1 tablet by mouth once daily  0    diclofenac (VOLTAREN) 1 % gel   0    scopolamine (TRANSDERM-SCOP) 1 mg over 3 days pt3d 1 Patch by TransDERmal route every seventy-two (72) hours. 10 Patch 2    pregabalin (LYRICA) 75 mg capsule Take 1 Cap by mouth two (2) times a day. Max Daily Amount: 150 mg. 60 Cap 1    nortriptyline (PAMELOR) 10 mg capsule Take 1 Cap by mouth nightly. 30 Cap 1    HYDROcodone-acetaminophen (NORCO) 7.5-325 mg per tablet Take 1 Tab by mouth every eight (8) hours as needed for Pain. Max Daily Amount: 3 Tabs. 15 Tab 0    predniSONE (DELTASONE) 10 mg tablet Take 1 Tab by mouth two (2) times a day.  30 Tab 0    valACYclovir (VALTREX) 1 gram tablet Take 1 Tab by mouth daily. 30 Tab 1    meclizine (ANTIVERT) 25 mg tablet Take 1 Tab by mouth nightly. 60 Tab 3        Objective:   Vitals:  Vitals:    10/14/19 1129   BP: 128/74   Pulse: 67   Resp: 18   Temp: 98.1 °F (36.7 °C)   TempSrc: Temporal   SpO2: 93%   Weight: 210 lb 12.8 oz (95.6 kg)   Height: 5' 1\" (1.549 m)   PainSc:   3   PainLoc: Back       Lab Data Reviewed:  Lab Results   Component Value Date/Time    WBC 9.0 02/13/2019 11:52 AM    HCT 40.8 02/13/2019 11:52 AM    HGB 13.1 02/13/2019 11:52 AM    PLATELET 623 24/20/5411 11:52 AM       No results found for: NA, K, CL, CO2, GLU, BUN, CREA, CA    No components found for: TROPQUANT    No results found for: PAMELA      No results found for: HBA1C, HGBE8, [id number], [id number]     Lab Results   Component Value Date/Time    Vitamin B12 426 09/11/2018 10:38 AM       No results found for: PAMELA, ANARX, ANAIGG, XBANA    No results found for: CHOL, CHOLPOCT, CHOLX, CHLST, CHOLV, HDL, HDLPOC, HDLP, LDL, LDLCPOC, LDLC, DLDLP, VLDLC, VLDL, TGLX, TRIGL, TRIGP, TGLPOCT, CHHD, CHHDX      CT Results (recent):  No results found for this or any previous visit. MRI Results (recent):  Results from [place] encounter on 10/17/18   MRI LUMB SPINE WO CONT    Narrative EXAM:  MRI LUMB SPINE WO CONT    INDICATION:  radiculopathy, low back pain. Acute bilateral lower back pain. Bilateral leg and feet swelling. COMPARISON: None    TECHNIQUE: MR imaging of the lumbar spine was performed using the following  sequences: sagittal T1, T2, STIR;  axial T1, T2.     CONTRAST:  None. FINDINGS:    At T11-12 and T12-L1 as well as T10-11 there is bilateral facet arthropathy. The  central canal measures 10 mm anterior to posterior is minimum. Mild disc bulges  are present at T11-12 and T12-L1. There is normal alignment of the lumbar spine. Vertebral body heights are  maintained. Marrow signal is normal.    The conus medullaris terminates at L2.  Signal and caliber of the distal spinal  cord are within normal limits. The paraspinal soft tissues are within normal limits. L1-L2:  No herniation or stenosis. Broad disc bulge. Facet arthropathy is  asymmetric to the left. Central canal measures 12 mm anterior to posterior. No  significant neural foraminal stenosis (series 601, image 21). L2-L3:  No herniation or stenosis. Broad disc bulge. Bilateral facet  arthropathy. Central canal measures 13 mm anterior to posterior. Minimal  right-sided neural foraminal stenosis (axial image 16). L3-L4:  Broad disc bulge, bilateral ligamentum flavum hypertrophy and facet  arthropathy. Central canal measures 6.7 mm anterior to posterior. Mild bilateral  neural foraminal stenosis. (Series 601, image 12). L4-L5:  Broad disc bulge. Bilateral facet arthropathy. Central canal measures  12.1 mm anterior to posterior. Moderate bilateral neural foraminal stenosis. (Reference series 601, image 8). L5-S1:  No herniation or stenosis. Nonspecific edema in the soft tissues posterior to the L2, L3, and L4 levels. Impression IMPRESSION:    Multifactorial central canal stenosis L3-4  Moderate bilateral L5-S1 neural foraminal stenosis  Low-lying cord, without obvious fatty filum or mass. IR Results (recent):  No results found for this or any previous visit. VAS/US Results (recent):  No results found for this or any previous visit. PHYSICAL EXAM:  General:    Alert, cooperative, no distress, appears stated age. Head:   Normocephalic, without obvious abnormality, atraumatic. Eyes:   Conjunctivae/corneas clear. PERRLA  Nose:  Nares normal. No drainage or sinus tenderness. Throat:    Lips, mucosa, and tongue normal.  No Thrush  Neck:  Supple, symmetrical,  no adenopathy, thyroid: non tender    no carotid bruit and no JVD. Back:    Symmetric, bilateral tenderness. Lungs:   Clear to auscultation bilaterally. No Wheezing or Rhonchi. No rales. Chest wall:  No tenderness or deformity.  No Accessory muscle use. Heart:   Regular rate and rhythm,  no murmur, rub or gallop. Abdomen:   Soft, non-tender. Not distended. Bowel sounds normal. No masses  Extremities: Extremities normal, atraumatic, No cyanosis. No edema. No clubbing  Skin:     Texture, turgor normal. No rashes or lesions. Not Jaundiced  Lymph nodes: Cervical, supraclavicular normal.  Psych:  Good insight. Not depressed. Not anxious or agitated. NEUROLOGICAL EXAM:  Appearance: The patient is well developed, well nourished, provides a coherent history and is in no acute distress. Mental Status: Oriented to time, place and person. Mood and affect appropriate. Cranial Nerves:   Intact visual fields. Fundi are benign. GITA, EOM's full, no nystagmus, no ptosis. Facial sensation is normal. Corneal reflexes are intact. Facial movement is symmetric. Hearing is normal bilaterally. Palate is midline with normal sternocleidomastoid and trapezius muscles are normal. Tongue is midline. Motor:  5/5 strength in upper extremity and 5-/5 lower extremity proximal and distal muscles. Normal bulk and tone. No fasciculations. Reflexes:   Deep tendon reflexes 2+/4 and symmetrical.   Sensory:    Dysesthesia to touch, pinprick and vibration. Gait:   Slightly unsteady gait. Tremor:   No tremor noted. Cerebellar:  No cerebellar signs present. Neurovascular:  Normal heart sounds and regular rhythm, peripheral pulses intact, and no carotid bruits. Assesment  1. Polyneuropathy  Stable    2. Migraine without aura and without status migrainosus, not intractable  Stable    3. POORNIMA (generalized anxiety disorder)  Stable    4. Chronic bilateral low back pain without sciatica  Stable    5. Paresthesia  Stable    6. TIA (transient ischemic attack)  Stable    7. Autoimmune disease [facility])  Following rheumatology    ___________________________________________________  PLAN: Medication and plan discussed with patient      ICD-10-CM ICD-9-CM    1. Polyneuropathy G62.9 356.9    2. Migraine without aura and without status migrainosus, not intractable G43.009 346.10    3. POORNIMA (generalized anxiety disorder) F41.1 300.02    4. Chronic bilateral low back pain without sciatica M54.5 724.2     G89.29 338.29    5. Paresthesia R20.2 782.0    6. TIA (transient ischemic attack) G45.9 435.9    7. Autoimmune disease ([address] M35.9 279.49      Follow-up and Dispositions    · Return in about 3 months (around 1/14/2020).                ___________________________________________________    Attending Physician: Lois Frances MD

## 2019-10-19 LAB — DRUGS UR: NORMAL

## 2019-10-23 ENCOUNTER — HOSPITAL ENCOUNTER (OUTPATIENT)
Dept: GENERAL RADIOLOGY | Age: 79
Discharge: HOME | End: 2019-10-23
Attending: INTERNAL MEDICINE
Payer: MEDICARE

## 2019-10-23 DIAGNOSIS — J44.9 COPD (CHRONIC OBSTRUCTIVE PULMONARY DISEASE) (HCC): ICD-10-CM

## 2019-10-23 PROCEDURE — 71046 X-RAY EXAM CHEST 2 VIEWS: CPT

## 2020-01-13 ENCOUNTER — OFFICE VISIT (OUTPATIENT)
Dept: NEUROLOGY | Age: 80
End: 2020-01-13

## 2020-01-13 VITALS
HEART RATE: 68 BPM | OXYGEN SATURATION: 98 % | WEIGHT: 216.6 LBS | SYSTOLIC BLOOD PRESSURE: 146 MMHG | DIASTOLIC BLOOD PRESSURE: 72 MMHG | TEMPERATURE: 97.9 F | RESPIRATION RATE: 18 BRPM | HEIGHT: 61 IN | BODY MASS INDEX: 40.89 KG/M2

## 2020-01-13 DIAGNOSIS — G89.29 CHRONIC BILATERAL LOW BACK PAIN WITHOUT SCIATICA: ICD-10-CM

## 2020-01-13 DIAGNOSIS — M54.50 CHRONIC BILATERAL LOW BACK PAIN WITHOUT SCIATICA: ICD-10-CM

## 2020-01-13 DIAGNOSIS — R42 DIZZINESS: ICD-10-CM

## 2020-01-13 DIAGNOSIS — M35.9 AUTOIMMUNE DISEASE (HCC): ICD-10-CM

## 2020-01-13 DIAGNOSIS — G45.9 TIA (TRANSIENT ISCHEMIC ATTACK): ICD-10-CM

## 2020-01-13 DIAGNOSIS — G43.009 MIGRAINE WITHOUT AURA AND WITHOUT STATUS MIGRAINOSUS, NOT INTRACTABLE: ICD-10-CM

## 2020-01-13 DIAGNOSIS — G62.9 POLYNEUROPATHY: Primary | ICD-10-CM

## 2020-01-13 DIAGNOSIS — F41.9 ANXIETY: ICD-10-CM

## 2020-01-13 RX ORDER — DIAZEPAM 10 MG/1
10 TABLET ORAL
Qty: 60 TAB | Refills: 3 | Status: SHIPPED | OUTPATIENT
Start: 2020-01-13 | End: 2020-08-27

## 2020-01-13 RX ORDER — FUROSEMIDE 40 MG/1
40 TABLET ORAL DAILY
Qty: 90 TAB | Refills: 1 | Status: SHIPPED | OUTPATIENT
Start: 2020-01-13 | End: 2020-07-08 | Stop reason: DRUGHIGH

## 2020-01-13 RX ORDER — CHLORTHALIDONE 25 MG/1
TABLET ORAL
COMMUNITY
Start: 2020-01-06 | End: 2022-01-31

## 2020-01-13 RX ORDER — PREDNISONE 10 MG/1
10 TABLET ORAL 2 TIMES DAILY
Qty: 30 TAB | Refills: 0 | Status: SHIPPED | OUTPATIENT
Start: 2020-01-13 | End: 2020-03-31 | Stop reason: SDUPTHER

## 2020-01-13 NOTE — PROGRESS NOTES
Chief Complaint   Patient presents with    Neurologic Problem     Polyneuropathy    Medication Refill     1. Have you been to the ER, urgent care clinic since your last visit? Hospitalized since your last visit? Urgent Care    2. Have you seen or consulted any other health care providers outside of the [address] [place] [address] since your last visit? Include any pap smears or colon screening.  Urgent Care    Pill count not performed patient did not bring medications      Pill not count verified with patient  Patient reports taking medication  10/14/19  UDS obtained and sent   Pain contract on file   made available for Dr. Jennifer Borrego  review  Script given for Valium

## 2020-01-13 NOTE — PROGRESS NOTES
Neurology Progress Note    NAME:  Bharat Nava   :   1940   MRN:   [id number]     Date/Time:  2020  Subjective:     Neurology Progress Note    NAME:  Bharat Nava   :   1940   MRN:   [id number]     Date/Time:  2020  Subjective: Bharat Nava is a 70 y.o. female here today for follow-up for pain, numbness or tingling sensation leg pain and back pain, anxiety, neuropathy, dizziness. Patient was accompanied by daughter to the visit. Says she says she still experiencing swelling of both legs, saw her primary care physician who started her on hydrochlorothiazide but it did not help. At this time, I will discontinue hydrochlorothiazide, and start patient on Lasix as she tolerated it before. Patient was advised to eat bananas and then given potassium chloride for replacement 10 mg p.o. daily  She says she did not have any falls since last visit. She says she still having the back pain but not as much as before, back pain is intermittent, sharp in nature, burning sensation that goes down to the legs causing weakness, numbness and tingling sensation of the left. Says her dizziness waxes and wanes, has not been very frequent, however when she experiences headache is always accompanied with dizziness. Headache has been very minimal.  Denies loss of consciousness, dysphagia or odynophagia.   Review of Systems - General ROS: positive for  - fatigue and sleep disturbance  Psychological ROS: positive for - anxiety, depression and sleep disturbances  Ophthalmic ROS: positive for - blurry vision and uses glasses  ENT ROS: positive for - headaches, tinnitus, vertigo and visual changes  Allergy and Immunology ROS: negative  Hematological and Lymphatic ROS: negative  Endocrine ROS: negative  Respiratory ROS: no cough, shortness of breath, or wheezing  Cardiovascular ROS: no chest pain or dyspnea on exertion  Gastrointestinal ROS: no abdominal pain, change in bowel habits, or black or bloody stools  Genito-Urinary ROS: no dysuria, trouble voiding, or hematuria  Musculoskeletal ROS: positive for - gait disturbance, joint pain, joint stiffness, joint swelling, muscle pain, muscular weakness and pain in back - bilateral and leg - bilateral  Neurological ROS: positive for - dizziness, gait disturbance, headaches, numbness/tingling and weakness  Dermatological ROS: negative       Medications reviewed:  Current Outpatient Medications   Medication Sig Dispense Refill    diazePAM (VALIUM) 10 mg tablet Take 1 Tab by mouth every twelve (12) hours as needed for Anxiety. Max Daily Amount: 20 mg. 60 Tab 3    predniSONE (DELTASONE) 10 mg tablet Take 10 mg by mouth two (2) times a day. 30 Tab 0    furosemide (LASIX) 40 mg tablet Take 1 Tab by mouth daily. 90 Tab 1    verapamil ER (VERELAN) 120 mg ER capsule Take 1 Cap by mouth daily. 30 Cap 6    folic acid (FOLVITE) 1 mg tablet Take 1 Tab by mouth daily. 30 Tab 3    OTHER Lumbar brace  Lumbar stenosis 1 Units 0    levothyroxine (SYNTHROID) 75 mcg tablet Take  by mouth daily. 0    cholecalciferol, vitamin D3, (VITAMIN D3) 2,000 unit tab Take  by mouth daily.  TIOTROPIUM BROMIDE (SPIRIVA WITH HANDIHALER IN) Take 2 Puffs by inhalation daily.  aspirin delayed-release 81 mg tablet Take  by mouth daily.  hydroxychloroquine (PLAQUENIL) 200 mg tablet Take 200 mg by mouth two (2) times a day.  methotrexate (RHEUMATREX) 2.5 mg tablet Take  by mouth every Sunday. 6 capsules      Oxygen nightly. 2 liters      chlorthalidone (HYGROTEN) 25 mg tablet       clobetasol (TEMOVATE) 0.05 % topical cream apply to affected area twice a day externally  0    levomefolate-B6-meB12-algal oil (METANX, ALGAL OIL,) 3 mg-35 mg-2 mg -90.314 mg cap Take 1 Cap by mouth daily.  30 Cap 3    lisinopril-hydroCHLOROthiazide (PRINZIDE, ZESTORETIC) 10-12.5 mg per tablet take 1 tablet by mouth once daily  0    diclofenac (VOLTAREN) 1 % gel   0    scopolamine (TRANSDERM-SCOP) 1 mg over 3 days pt3d 1 Patch by TransDERmal route every seventy-two (72) hours. 10 Patch 2    pregabalin (LYRICA) 75 mg capsule Take 1 Cap by mouth two (2) times a day. Max Daily Amount: 150 mg. 60 Cap 1    nortriptyline (PAMELOR) 10 mg capsule Take 1 Cap by mouth nightly. 30 Cap 1    HYDROcodone-acetaminophen (NORCO) 7.5-325 mg per tablet Take 1 Tab by mouth every eight (8) hours as needed for Pain. Max Daily Amount: 3 Tabs. 15 Tab 0    valACYclovir (VALTREX) 1 gram tablet Take 1 Tab by mouth daily. 30 Tab 1    meclizine (ANTIVERT) 25 mg tablet Take 1 Tab by mouth nightly. 60 Tab 3        Objective:   Vitals:  Vitals:    01/13/20 1041   BP: 146/72   Pulse: 68   Resp: 18   Temp: 97.9 °F (36.6 °C)   TempSrc: Temporal   SpO2: 98%   Weight: 216 lb 9.6 oz (98.2 kg)   Height: 5' 1\" (1.549 m)   PainSc:   6   PainLoc: Head       Lab Data Reviewed:  Lab Results   Component Value Date/Time    WBC 9.0 02/13/2019 11:52 AM    HCT 40.8 02/13/2019 11:52 AM    HGB 13.1 02/13/2019 11:52 AM    PLATELET 941 83/76/9186 11:52 AM       No results found for: NA, K, CL, CO2, GLU, BUN, CREA, CA    No components found for: TROPQUANT    No results found for: PAMELA      No results found for: HBA1C, HGBE8, [id number], LPU1LRPJ     Lab Results   Component Value Date/Time    Vitamin B12 426 09/11/2018 10:38 AM       No results found for: PAMELA, ANARX, ANAIGG, XBANA    No results found for: CHOL, CHOLPOCT, CHOLX, CHLST, CHOLV, HDL, HDLPOC, HDLP, LDL, LDLCPOC, LDLC, DLDLP, VLDLC, VLDL, TGLX, TRIGL, TRIGP, TGLPOCT, CHHD, CHHDX      CT Results (recent):  No results found for this or any previous visit. MRI Results (recent):  Results from [place] encounter on 10/17/18   MRI LUMB SPINE WO CONT    Narrative EXAM:  MRI LUMB SPINE WO CONT    INDICATION:  radiculopathy, low back pain. Acute bilateral lower back pain. Bilateral leg and feet swelling.     COMPARISON: None    TECHNIQUE: MR imaging of the lumbar spine was performed using the following  sequences: sagittal T1, T2, STIR;  axial T1, T2.     CONTRAST:  None. FINDINGS:    At T11-12 and T12-L1 as well as T10-11 there is bilateral facet arthropathy. The  central canal measures 10 mm anterior to posterior is minimum. Mild disc bulges  are present at T11-12 and T12-L1. There is normal alignment of the lumbar spine. Vertebral body heights are  maintained. Marrow signal is normal.    The conus medullaris terminates at L2. Signal and caliber of the distal spinal  cord are within normal limits. The paraspinal soft tissues are within normal limits. L1-L2:  No herniation or stenosis. Broad disc bulge. Facet arthropathy is  asymmetric to the left. Central canal measures 12 mm anterior to posterior. No  significant neural foraminal stenosis (series 601, image 21). L2-L3:  No herniation or stenosis. Broad disc bulge. Bilateral facet  arthropathy. Central canal measures 13 mm anterior to posterior. Minimal  right-sided neural foraminal stenosis (axial image 16). L3-L4:  Broad disc bulge, bilateral ligamentum flavum hypertrophy and facet  arthropathy. Central canal measures 6.7 mm anterior to posterior. Mild bilateral  neural foraminal stenosis. (Series 601, image 12). L4-L5:  Broad disc bulge. Bilateral facet arthropathy. Central canal measures  12.1 mm anterior to posterior. Moderate bilateral neural foraminal stenosis. (Reference series 601, image 8). L5-S1:  No herniation or stenosis. Nonspecific edema in the soft tissues posterior to the L2, L3, and L4 levels. Impression IMPRESSION:    Multifactorial central canal stenosis L3-4  Moderate bilateral L5-S1 neural foraminal stenosis  Low-lying cord, without obvious fatty filum or mass. IR Results (recent):  No results found for this or any previous visit. VAS/US Results (recent):  No results found for this or any previous visit.     PHYSICAL EXAM:  General:    Alert, cooperative, no distress, appears stated age. Head:   Normocephalic, without obvious abnormality, atraumatic. Eyes:   Conjunctivae/corneas clear. PERRLA  Nose:  Nares normal. No drainage or sinus tenderness. Throat:    Lips, mucosa, and tongue normal.  No Thrush  Neck:  Supple, symmetrical,  no adenopathy, thyroid: non tender    no carotid bruit and no JVD. Back:    Symmetric, bilateral tenderness. Lungs:   Clear to auscultation bilaterally. No Wheezing or Rhonchi. No rales. Chest wall:  No tenderness or deformity. No Accessory muscle use. Heart:   Regular rate and rhythm,  no murmur, rub or gallop. Abdomen:   Soft, non-tender. Not distended. Bowel sounds normal. No masses  Extremities: Extremities normal, atraumatic, No cyanosis. No edema. No clubbing  Skin:     Texture, turgor normal. No rashes or lesions. Not Jaundiced  Lymph nodes: Cervical, supraclavicular normal.  Psych:  Good insight. Not depressed. Anxious. NEUROLOGICAL EXAM:  Appearance: The patient is well developed, well nourished, provides a coherent history and is in no acute distress. Mental Status: Oriented to time, place and person. Mood and affect appropriate. Cranial Nerves:   Intact visual fields. Fundi are benign. GITA, EOM's full, no nystagmus, no ptosis. Facial sensation is normal. Corneal reflexes are intact. Facial movement is symmetric. Hearing is normal bilaterally. Palate is midline with normal sternocleidomastoid and trapezius muscles are normal. Tongue is midline. Motor:  5/5 strength in upper and lower proximal and distal muscles. Normal bulk and tone. No fasciculations. Reflexes:   Deep tendon reflexes 2+/4 and symmetrical.   Sensory:    Decreased sensation  to touch, pinprick and vibration bilateral lower extremity up to the knee upper extremity in glove distribution. Gait:  Normal gait. Tremor:    Mild tremor noted. Cerebellar:  No cerebellar signs present.    Neurovascular:  Normal heart sounds and regular rhythm, peripheral pulses intact, and no carotid bruits. Assesment  1. Anxiety    - diazePAM (VALIUM) 10 mg tablet; Take 1 Tab by mouth every twelve (12) hours as needed for Anxiety. Max Daily Amount: 20 mg. Dispense: 60 Tab; Refill: 3    2. Dizziness    - diazePAM (VALIUM) 10 mg tablet; Take 1 Tab by mouth every twelve (12) hours as needed for Anxiety. Max Daily Amount: 20 mg. Dispense: 60 Tab; Refill: 3    3. Polyneuropathy  Continue management    4. Migraine without aura and without status migrainosus, not intractable  Stable    5. Chronic bilateral low back pain without sciatica  Physical therapy    6. TIA (transient ischemic attack)  Stable    7. Autoimmune disease [facility])  Following rheumatology    ___________________________________________________  PLAN: Medication and plan discussed with patient and daughter      ICD-10-CM ICD-9-CM    1. Polyneuropathy G62.9 356.9    2. Anxiety F41.9 300.00 diazePAM (VALIUM) 10 mg tablet   3. Dizziness R42 780.4 diazePAM (VALIUM) 10 mg tablet   4. Migraine without aura and without status migrainosus, not intractable G43.009 346.10    5. Chronic bilateral low back pain without sciatica M54.5 724.2     G89.29 338.29    6. TIA (transient ischemic attack) G45.9 435.9    7. [facility] M35.9 279.49      Follow-up and Dispositions    · Return in about 3 months (around 4/13/2020).              ___________________________________________________    Attending Physician: Marli Richard MD

## 2020-03-05 DIAGNOSIS — G43.909 MIGRAINE WITHOUT STATUS MIGRAINOSUS, NOT INTRACTABLE, UNSPECIFIED MIGRAINE TYPE: ICD-10-CM

## 2020-03-05 RX ORDER — VERAPAMIL HYDROCHLORIDE 120 MG/1
CAPSULE, EXTENDED RELEASE ORAL
Qty: 30 CAP | Refills: 1 | Status: SHIPPED | OUTPATIENT
Start: 2020-03-05 | End: 2020-05-07

## 2020-03-31 RX ORDER — PREDNISONE 10 MG/1
TABLET ORAL
Qty: 30 TAB | Refills: 0 | Status: SHIPPED | OUTPATIENT
Start: 2020-03-31 | End: 2022-01-31

## 2020-03-31 RX ORDER — PREDNISONE 10 MG/1
10 TABLET ORAL 2 TIMES DAILY
Qty: 30 TAB | Refills: 0 | Status: SHIPPED | OUTPATIENT
Start: 2020-03-31 | End: 2020-03-31

## 2020-05-07 DIAGNOSIS — G43.909 MIGRAINE WITHOUT STATUS MIGRAINOSUS, NOT INTRACTABLE, UNSPECIFIED MIGRAINE TYPE: ICD-10-CM

## 2020-05-07 RX ORDER — VERAPAMIL HYDROCHLORIDE 120 MG/1
CAPSULE, EXTENDED RELEASE ORAL
Qty: 30 CAP | Refills: 1 | Status: SHIPPED | OUTPATIENT
Start: 2020-05-07 | End: 2020-05-29

## 2020-05-21 ENCOUNTER — TELEPHONE (OUTPATIENT)
Dept: NEUROLOGY | Age: 80
End: 2020-05-21

## 2020-05-21 NOTE — TELEPHONE ENCOUNTER
----- Message from Merline Ambrosia sent at 5/21/2020  1:36 PM EDT -----  Regarding: [facility]/MD/TELEPHONE  Contact: [phone]  Caller's first and last name: Shelley Iglesias  Reason for call: Request for pathology report  Callback required yes/no and why: Yes  Best contact number(s): [phone]  Details to clarify the request: Patient would like the pathology report confirming that she has lupus. Per patient please mail to the address on file.

## 2020-05-26 NOTE — TELEPHONE ENCOUNTER
Patient's call returned, ID verified times 2. Patient stated she Dr. Lyla Ceja found the Lupus and she was diagnosed in 2015. Patient made aware Dr. Lyla Ceja does not have any past records. Patient made aware to have her rheumatologist may have to retest her for Lupus. Patient stated she has been getting check every three months to see how her Lupus is doing.

## 2020-05-29 DIAGNOSIS — G43.909 MIGRAINE WITHOUT STATUS MIGRAINOSUS, NOT INTRACTABLE, UNSPECIFIED MIGRAINE TYPE: ICD-10-CM

## 2020-05-29 RX ORDER — VERAPAMIL HYDROCHLORIDE 120 MG/1
CAPSULE, EXTENDED RELEASE ORAL
Qty: 30 CAP | Refills: 1 | Status: SHIPPED | OUTPATIENT
Start: 2020-05-29 | End: 2020-06-23

## 2020-06-23 DIAGNOSIS — G43.909 MIGRAINE WITHOUT STATUS MIGRAINOSUS, NOT INTRACTABLE, UNSPECIFIED MIGRAINE TYPE: ICD-10-CM

## 2020-06-23 RX ORDER — VERAPAMIL HYDROCHLORIDE 120 MG/1
CAPSULE, EXTENDED RELEASE ORAL
Qty: 30 CAP | Refills: 1 | Status: SHIPPED | OUTPATIENT
Start: 2020-06-23 | End: 2020-07-08 | Stop reason: SDUPTHER

## 2020-07-06 ENCOUNTER — TELEPHONE (OUTPATIENT)
Dept: NEUROLOGY | Age: 80
End: 2020-07-06

## 2020-07-06 NOTE — TELEPHONE ENCOUNTER
Returned call, ID verified times 2. Patient was made aware her appointment on 7/8/20 at [address] [facility]. Patient stated she did not know where she was supposed to go. Patient stated she would like to change her appointment to a telephone visit due to her immune system is low.

## 2020-07-08 ENCOUNTER — OFFICE VISIT (OUTPATIENT)
Dept: NEUROLOGY | Age: 80
End: 2020-07-08

## 2020-07-08 VITALS — WEIGHT: 216 LBS | BODY MASS INDEX: 40.81 KG/M2

## 2020-07-08 DIAGNOSIS — G43.909 MIGRAINE WITHOUT STATUS MIGRAINOSUS, NOT INTRACTABLE, UNSPECIFIED MIGRAINE TYPE: ICD-10-CM

## 2020-07-08 DIAGNOSIS — F41.1 GAD (GENERALIZED ANXIETY DISORDER): ICD-10-CM

## 2020-07-08 DIAGNOSIS — R20.2 PARESTHESIA: ICD-10-CM

## 2020-07-08 DIAGNOSIS — R41.82 ALTERED MENTAL STATUS, UNSPECIFIED ALTERED MENTAL STATUS TYPE: ICD-10-CM

## 2020-07-08 DIAGNOSIS — M35.9 AUTOIMMUNE DISEASE (HCC): ICD-10-CM

## 2020-07-08 DIAGNOSIS — G43.019 INTRACTABLE MIGRAINE WITHOUT AURA AND WITHOUT STATUS MIGRAINOSUS: ICD-10-CM

## 2020-07-08 DIAGNOSIS — G62.9 POLYNEUROPATHY: ICD-10-CM

## 2020-07-08 DIAGNOSIS — G45.9 TIA (TRANSIENT ISCHEMIC ATTACK): ICD-10-CM

## 2020-07-08 DIAGNOSIS — R42 DIZZINESS: Primary | ICD-10-CM

## 2020-07-08 DIAGNOSIS — G44.85 PRIMARY STABBING HEADACHE: ICD-10-CM

## 2020-07-08 RX ORDER — FUROSEMIDE 20 MG/1
20 TABLET ORAL DAILY
Qty: 30 TAB | Refills: 2 | Status: SHIPPED | OUTPATIENT
Start: 2020-07-08 | End: 2020-09-30

## 2020-07-08 RX ORDER — VERAPAMIL HYDROCHLORIDE 120 MG/1
CAPSULE, EXTENDED RELEASE ORAL
Qty: 30 CAP | Refills: 3 | Status: SHIPPED | OUTPATIENT
Start: 2020-07-08 | End: 2020-10-14 | Stop reason: SDUPTHER

## 2020-07-08 RX ORDER — POTASSIUM CHLORIDE 750 MG/1
10 TABLET, EXTENDED RELEASE ORAL DAILY
Qty: 30 TAB | Refills: 2 | Status: SHIPPED | OUTPATIENT
Start: 2020-07-08 | End: 2020-09-29

## 2020-07-08 NOTE — PROGRESS NOTES
Neurology Progress Note    NAME:  Jonel Carlson   :   1940   MRN:   [id number]     Date/Time:  2020  Subjective: Jonel Carlson is a 68 y.o. female here today for follow-up for pain, numbness or tingling sensation leg pain and back pain, anxiety, neuropathy, dizziness. Patient says about 4 weeks ago she had episode of being very dizzy, confused, after having a sharp pain on the left side of the head. The pain lasted for minutes to seconds. Patient says she was unable to get up and move around because of the weakness. She was home by herself, she says she had to be in bed for nearly 4 days when she started gradually getting up and moving around. She noted a once in a while she experiences a sharp head pain with dizziness but has not gotten any worse since. She says she monitors her blood pressure, and noted that the blood pressure appeared to be running low, systolic between 80 and 96, diastolic remains at 70. Because of the episode, I will obtain MRI of the brain with and without gadolinium to evaluate for CVA versus vascular malformation. I will also obtain EEG. I will adjust patient's Lasix to 20 mg p.o. daily and there potassium chloride 10 mEq daily. She says she occasionally experiences back pain, back pain is intermittent, sharp in nature, burning sensation that goes down to the legs causing weakness, numbness and tingling sensation of the left. Headache and dizziness have been more frequent lately and patient says she experiences a lot of pain in her joints. She however denies any fall, dysphagia or odynophagia.   Review of Systems - General ROS: positive for  - fatigue and sleep disturbance  Psychological ROS: positive for - anxiety, depression and sleep disturbances  Ophthalmic ROS: positive for - blurry vision and uses glasses  ENT ROS: positive for - headaches, tinnitus, vertigo and visual changes  Allergy and Immunology ROS: negative  Hematological and Lymphatic ROS: negative  Endocrine ROS: negative  Respiratory ROS: no cough, shortness of breath, or wheezing  Cardiovascular ROS: no chest pain or dyspnea on exertion  Gastrointestinal ROS: no abdominal pain, change in bowel habits, or black or bloody stools  Genito-Urinary ROS: no dysuria, trouble voiding, or hematuria  Musculoskeletal ROS: positive for - gait disturbance, joint pain, joint stiffness, joint swelling, muscle pain, muscular weakness and pain in back - bilateral and leg - bilateral  Neurological ROS: positive for - dizziness, gait disturbance, headaches, numbness/tingling and weakness  Dermatological ROS: negative       Medications reviewed:  Current Outpatient Medications   Medication Sig Dispense Refill    tiotropium-olodateroL (STIOLTO RESPIMAT) 2.5-2.5 mcg/actuation inhaler Stiolto Respimat 2.5 mcg-2.5 mcg/actuation solution for inhalation      ascorbic acid (VITAMIN C PO) Take  by mouth as needed.  furosemide (LASIX) 20 mg tablet Take 1 Tab by mouth daily. 30 Tab 2    potassium chloride (KLOR-CON) 10 mEq tablet Take 1 Tab by mouth daily. 30 Tab 2    verapamil ER (VERELAN) 120 mg ER capsule TAKE 1 CAPSULE BY MOUTH EVERY DAY 30 Cap 3    diazePAM (VALIUM) 10 mg tablet Take 1 Tab by mouth every twelve (12) hours as needed for Anxiety. Max Daily Amount: 20 mg. 60 Tab 3    folic acid (FOLVITE) 1 mg tablet Take 1 Tab by mouth daily. 30 Tab 3    OTHER Lumbar brace  Lumbar stenosis 1 Units 0    levothyroxine (SYNTHROID) 75 mcg tablet Take  by mouth daily. 0    cholecalciferol, vitamin D3, (VITAMIN D3) 2,000 unit tab Take  by mouth daily.  aspirin delayed-release 81 mg tablet Take  by mouth daily.  hydroxychloroquine (PLAQUENIL) 200 mg tablet Take 200 mg by mouth daily.  methotrexate (RHEUMATREX) 2.5 mg tablet Take  by mouth every Sunday. 6 capsules      Oxygen nightly.  2 liters      predniSONE (DELTASONE) 10 mg tablet TAKE 1 TABLET BY MOUTH TWICE A DAY 30 Tab 0    chlorthalidone (HYGROTEN) 25 mg tablet       clobetasol (TEMOVATE) 0.05 % topical cream apply to affected area twice a day externally  0    levomefolate-B6-meB12-algal oil (METANX, ALGAL OIL,) 3 mg-35 mg-2 mg -90.314 mg cap Take 1 Cap by mouth daily. 30 Cap 3    lisinopril-hydroCHLOROthiazide (PRINZIDE, ZESTORETIC) 10-12.5 mg per tablet take 1 tablet by mouth once daily  0    diclofenac (VOLTAREN) 1 % gel   0    scopolamine (TRANSDERM-SCOP) 1 mg over 3 days pt3d 1 Patch by TransDERmal route every seventy-two (72) hours. 10 Patch 2    pregabalin (LYRICA) 75 mg capsule Take 1 Cap by mouth two (2) times a day. Max Daily Amount: 150 mg. 60 Cap 1    nortriptyline (PAMELOR) 10 mg capsule Take 1 Cap by mouth nightly. 30 Cap 1    HYDROcodone-acetaminophen (NORCO) 7.5-325 mg per tablet Take 1 Tab by mouth every eight (8) hours as needed for Pain. Max Daily Amount: 3 Tabs. 15 Tab 0    valACYclovir (VALTREX) 1 gram tablet Take 1 Tab by mouth daily. 30 Tab 1    meclizine (ANTIVERT) 25 mg tablet Take 1 Tab by mouth nightly. 60 Tab 3    TIOTROPIUM BROMIDE (SPIRIVA WITH HANDIHALER IN) Take 2 Puffs by inhalation daily.           Objective:   Vitals:  Vitals:    07/08/20 0849   Weight: 216 lb (98 kg)   PainSc:   0 - No pain         Lab Data Reviewed:  Lab Results   Component Value Date/Time    WBC 9.0 02/13/2019 11:52 AM    HCT 40.8 02/13/2019 11:52 AM    HGB 13.1 02/13/2019 11:52 AM    PLATELET 822 02/94/2531 11:52 AM       No results found for: NA, K, CL, CO2, GLU, BUN, CREA, CA    No components found for: TROPQUANT    No results found for: PAMELA      No results found for: HBA1C, HGBE8, [id number], [id number]     Lab Results   Component Value Date/Time    Vitamin B12 426 09/11/2018 10:38 AM       No results found for: PAMELA, ANARX, ANAIGG, XBANA    No results found for: CHOL, CHOLPOCT, CHOLX, CHLST, CHOLV, HDL, HDLPOC, HDLP, LDL, LDLCPOC, LDLC, DLDLP, VLDLC, VLDL, TGLX, TRIGL, TRIGP, TGLPOCT, CHHD, CHHDX      CT Results (recent):  No results found for this or any previous visit. MRI Results (recent):  Results from [place] encounter on 10/17/18   MRI LUMB SPINE WO CONT    Narrative EXAM:  MRI LUMB SPINE WO CONT    INDICATION:  radiculopathy, low back pain. Acute bilateral lower back pain. Bilateral leg and feet swelling. COMPARISON: None    TECHNIQUE: MR imaging of the lumbar spine was performed using the following  sequences: sagittal T1, T2, STIR;  axial T1, T2.     CONTRAST:  None. FINDINGS:    At T11-12 and T12-L1 as well as T10-11 there is bilateral facet arthropathy. The  central canal measures 10 mm anterior to posterior is minimum. Mild disc bulges  are present at T11-12 and T12-L1. There is normal alignment of the lumbar spine. Vertebral body heights are  maintained. Marrow signal is normal.    The conus medullaris terminates at L2. Signal and caliber of the distal spinal  cord are within normal limits. The paraspinal soft tissues are within normal limits. L1-L2:  No herniation or stenosis. Broad disc bulge. Facet arthropathy is  asymmetric to the left. Central canal measures 12 mm anterior to posterior. No  significant neural foraminal stenosis (series 601, image 21). L2-L3:  No herniation or stenosis. Broad disc bulge. Bilateral facet  arthropathy. Central canal measures 13 mm anterior to posterior. Minimal  right-sided neural foraminal stenosis (axial image 16). L3-L4:  Broad disc bulge, bilateral ligamentum flavum hypertrophy and facet  arthropathy. Central canal measures 6.7 mm anterior to posterior. Mild bilateral  neural foraminal stenosis. (Series 601, image 12). L4-L5:  Broad disc bulge. Bilateral facet arthropathy. Central canal measures  12.1 mm anterior to posterior. Moderate bilateral neural foraminal stenosis. (Reference series 601, image 8). L5-S1:  No herniation or stenosis. Nonspecific edema in the soft tissues posterior to the L2, L3, and L4 levels.       Impression IMPRESSION:    Multifactorial central canal stenosis L3-4  Moderate bilateral L5-S1 neural foraminal stenosis  Low-lying cord, without obvious fatty filum or mass. IR Results (recent):  No results found for this or any previous visit. VAS/US Results (recent):  No results found for this or any previous visit. PHYSICAL EXAM:  Deferred    NEUROLOGICAL EXAM: Deferred    Assesment  1. Dizziness    - MRI BRAIN W WO CONT; Future  - EEG; Future    2. Primary stabbing headache    - MRI BRAIN W WO CONT; Future    3. Intractable migraine without aura and without status migrainosus    - MRI BRAIN W WO CONT; Future    4. Polyneuropathy  Continue management    5. TIA (transient ischemic attack)    - MRI BRAIN W WO CONT; Future    6. Paresthesia    - MRI BRAIN W WO CONT; Future    7. Autoimmune disease [facility])  Following rheumatology    8. POORNIMA (generalized anxiety disorder)  Continue management    9. Migraine without status migrainosus, not intractable, unspecified migraine type    - verapamil ER (VERELAN) 120 mg ER capsule; TAKE 1 CAPSULE BY MOUTH EVERY DAY  Dispense: 30 Cap; Refill: 3    10. Altered mental status, unspecified altered mental status type    - EEG; Future    ___________________________________________________  PLAN: Medication and plan discussed with patient      ICD-10-CM ICD-9-CM    1. Dizziness R42 780.4 MRI BRAIN W WO CONT      EEG   2. Primary stabbing headache G44.85 339.85 MRI BRAIN W WO CONT   3. Intractable migraine without aura and without status migrainosus G43.019 346.11 MRI BRAIN W WO CONT   4. Polyneuropathy G62.9 356.9    5. TIA (transient ischemic attack) G45.9 435.9 MRI BRAIN W WO CONT   6. Paresthesia R20.2 782.0 MRI BRAIN W WO CONT   7. Autoimmune disease ([facility] M35.9 279.49    8. POORNIMA (generalized anxiety disorder) F41.1 300.02    9. Migraine without status migrainosus, not intractable, unspecified migraine type G43.909 346.90 verapamil ER (VERELAN) 120 mg ER capsule   10.  Altered mental status, unspecified altered mental status type R41.82 780.97 EEG     Follow-up and Dispositions    · Return in about 2 months (around 9/8/2020).        About 25 minutes was spent with patient    ___________________________________________________    Attending Physician: Roselia Smith MD

## 2020-07-08 NOTE — PATIENT INSTRUCTIONS
All test results will be discussed at the next virtual visit     MRI of the Head: About This Test  What is it? MRI (magnetic resonance imaging) is a test that uses a magnetic field and pulses of radio wave energy to make pictures of the organs and structures inside the body. An MRI of the head can give your doctor information about your brain, eyes, ears, and nerves. When you have an MRI, you lie on a table and the table moves into the MRI machine. Why is this test done? An MRI of the head can help find problems such as tumors and infection. It also can check symptoms of a head injury or of diseases such as multiple sclerosis (MS) and stroke. How do you prepare for the test?  In general, there's nothing you have to do before this test, unless your doctor tells you to. Tell your doctor if you get nervous in tight spaces. You may get a medicine to help you relax. If you think you'll get this medicine, be sure you have someone to take you home. What happens during the test?  · You may have contrast material (dye) put into your arm through a tube called an IV. · You will lie on a table that's part of the MRI scanner. · The table will slide into the space that contains the magnet. · Inside the scanner, you will hear a fan and feel air moving. You may hear tapping, thumping, or snapping noises. You may be given earplugs or headphones to reduce the noise. · You will be asked to hold still during the scan. You may be asked to hold your breath for short periods. · You may be alone in the scanning room. But a technologist will watch through a window and talk with you during the test.  How long does the test take? The test usually takes 30 to 60 minutes but can take as long as 2 hours. How does having an MRI of the head feel? You won't have pain from the magnetic field or radio waves used for the MRI test. But you may be tired or sore from lying in one position for a long time.   If a contrast material is used, you may feel some coolness when it is put into your IV. In rare cases, you may feel:  · Tingling in the mouth if you have metal dental fillings. · Warmth in the area being checked. This is normal. Tell the technologist if you have nausea, vomiting, a headache, dizziness, pain, burning, or breathing problems. What are the risks of an MRI of the head? There are no known harmful effects from the strong magnetic field used for an MRI. But the magnet is very powerful. It may affect any metal implants or other medical devices you have. Risks from contrast material  Contrast material that contains gadolinium may be used in this test. But for most people, the benefit of its use in this test outweighs the risk. Be sure to tell your doctor if you have kidney problems or are pregnant. There is a slight chance of an allergic reaction if contrast material is used during the test. But most reactions are mild and can be treated using medicine. If you breastfeed and are concerned about whether the contrast material used in this test is safe, talk to your doctor. Most experts believe that very little dye passes into breast milk and even less is passed on to the baby. But if you are concerned, you can stop breastfeeding for up to 24 hours after the test. During this time, you can give your baby breast milk that you stored before the test. Don't use the breast milk you pump in the 24 hours after the test. Throw it out. What happens after the test?  · You will probably be able to go home right away. It depends on the reason for the test.  · You can go back to your usual activities right away. Follow-up care is a key part of your treatment and safety. Be sure to make and go to all appointments, and call your doctor if you are having problems. It's also a good idea to keep a list of the medicines you take. Ask your doctor when you can expect to have your test results. Where can you learn more?   Go to http://www.gray.com/  Enter D013 in the search box to learn more about \"MRI of the Head: About This Test.\"  Current as of: December 9, 2019               Content Version: 12.5  © 2011-9235 Healthwise, [facility]. Care instructions adapted under license by [employer] (which disclaims liability or warranty for this information). If you have questions about a medical condition or this instruction, always ask your healthcare professional. [address] any warranty or liability for your use of this information. Electroencephalogram (EEG): About This Test  What is it? An electroencephalogram (EEG) lets a doctor see the electrical activity of your brain. You will have small pads or patches attached to different places on your head. These are called electrodes. Wires connect the electrodes to a computer. The computer records the activity of the brain. This looks like wavy lines on the computer screen or on paper. Why is this test done? The test is often used to diagnose epilepsy. It helps a doctor know what types of seizures are happening. An EEG can also check brain activity in people with sleep disorders. It can also help a doctor know why a person passed out (lost consciousness). How do you prepare for the test?  · Tell your doctor ALL the medicines, vitamins, supplements, and herbal remedies you take. Some may increase the risk of problems during your test. Your doctor will tell you if you should stop taking any of them before the test and how soon to do it. · Do not eat or drink anything with caffeine in it for 12 hours before the test. This includes cola, energy drinks, and chocolate. · Shampoo your hair and rinse with clear water the evening before or the morning of the test. Do not put any hair conditioner or oil on after you wash your hair.   · Your doctor may ask you not to sleep the night before the test or to sleep for only about 4 or 5 hours. This is because some types of brain activity can only be seen if you are asleep. If your doctor asks you to get less sleep than normal, plan to have someone drive you to and from the test.  How is the test done? · You will lie on your back on a bed or table. Or you might relax in a chair with your eyes closed. · A technologist will attach the electrodes to different places on your head. Or you might get a cap with fixed electrodes on it. · You will lie still with your eyes closed. The technologist will tell you not to talk unless you need to. · The technologist may ask you to:  ? Breathe deeply and rapidly. This is called hyperventilating. ? Look at a bright, flashing light called a strobe. ? Go to sleep. If you can't fall asleep, you may get medicine to help you. How long does the test take? The test will take about 1 to 2 hours. What are the risks of an electroencephalogram (EEG)? An EEG is a very safe test. The electrical activity of your brain is recorded. But no electrical current is put into your body. An EEG is not the same as electroshock (electroconvulsive) therapy. If you have a seizure disorder such as epilepsy, the flashing lights may trigger a seizure. Or a seizure may happen if you hyperventilate. If it happens, the technologist is trained to take care of you during the seizure. What happens after the test?  · You will probably be able to go home right away. It depends on the reason for the test.  · You can go back to your usual activities right away. · If paste was used to hold the electrodes in place, it may stick in your hair after the test. You will have to wash your hair to get it out. When should you call for help? Watch closely for changes in your health, and be sure to contact your doctor if:  · You have any problems that you think may be from the test.  · You have any questions about the test or have not received your results.   Follow-up care is a key part of your treatment and safety. Be sure to make and go to all appointments, and call your doctor if you are having problems. It's also a good idea to keep a list of the medicines you take. Ask your doctor when you can expect to have your test results. Where can you learn more? Go to http://jovan-caite.info/  Enter F196 in the search box to learn more about \"Electroencephalogram (EEG): About This Test.\"  Current as of: November 20, 2019               Content Version: 12.5  © 6527-3012 Healthwise, [employer]. Care instructions adapted under license by [employer] (which disclaims liability or warranty for this information). If you have questions about a medical condition or this instruction, always ask your healthcare professional. [address] any warranty or liability for your use of this information.

## 2020-07-20 ENCOUNTER — HOSPITAL ENCOUNTER (OUTPATIENT)
Dept: NEUROLOGY | Age: 80
Discharge: HOME | End: 2020-07-20
Attending: PSYCHIATRY & NEUROLOGY
Payer: MEDICARE

## 2020-07-20 ENCOUNTER — HOSPITAL ENCOUNTER (OUTPATIENT)
Dept: MRI IMAGING | Age: 80
Discharge: HOME | End: 2020-07-20
Attending: PSYCHIATRY & NEUROLOGY
Payer: MEDICARE

## 2020-07-20 DIAGNOSIS — R20.2 PARESTHESIA: ICD-10-CM

## 2020-07-20 DIAGNOSIS — R42 DIZZINESS: ICD-10-CM

## 2020-07-20 DIAGNOSIS — G44.85 PRIMARY STABBING HEADACHE: ICD-10-CM

## 2020-07-20 DIAGNOSIS — R41.82 ALTERED MENTAL STATUS, UNSPECIFIED ALTERED MENTAL STATUS TYPE: ICD-10-CM

## 2020-07-20 DIAGNOSIS — G45.9 TIA (TRANSIENT ISCHEMIC ATTACK): ICD-10-CM

## 2020-07-20 DIAGNOSIS — G43.019 INTRACTABLE MIGRAINE WITHOUT AURA AND WITHOUT STATUS MIGRAINOSUS: ICD-10-CM

## 2020-07-20 PROCEDURE — 95816 EEG AWAKE AND DROWSY: CPT

## 2020-07-20 PROCEDURE — 74011250636 HC RX REV CODE- 250/636: Performed by: PSYCHIATRY & NEUROLOGY

## 2020-07-20 PROCEDURE — 70553 MRI BRAIN STEM W/O & W/DYE: CPT

## 2020-07-20 PROCEDURE — A9575 INJ GADOTERATE MEGLUMI 0.1ML: HCPCS | Performed by: PSYCHIATRY & NEUROLOGY

## 2020-07-20 RX ORDER — GADOTERATE MEGLUMINE 376.9 MG/ML
20 INJECTION INTRAVENOUS
Status: COMPLETED | OUTPATIENT
Start: 2020-07-20 | End: 2020-07-20

## 2020-07-20 RX ADMIN — GADOTERATE MEGLUMINE 20 ML: 376.9 INJECTION INTRAVENOUS at 12:19

## 2020-07-21 NOTE — PROCEDURES
Patient Name: Marysol Kimball  : 1940  Age: 68 y.o. Ordering physician: Gavin Terry  Date of EE20  EEG procedure number: [id number]  Diagnosis: Seizures   Interpreting physician: Enrike Strange MD       ELECTROENCEPHALOGRAM REPORT     PROCEDURE: EEG. CLINICAL INDICATION: The patient is a 68 y.o. female who is being evaluated for baseline electro cerebral activities and to rule out seizure focus. EEG CLASSIFICATION: Normal    DESCRIPTION OF THE RECORD:   The background of this recording contains a posteriorly-located occipital alpha rhythm of 10-11Hz that attenuates with eye opening. Throughout the recording, there were no clear areas of focal slowing nor spike or spike-and-wave discharges seen. Hyperventilation was not performed. Photic stimulation produced minimal driving response in the posterior head regions. During the recording the patient did not achieve stage II sleep    INTERPRETATION: This is a normal electroencephalogram with the patient awake and asleep, showing no clear focal abnormalities or epileptiform activity. A normal EEG does not rule out the diagnosis of epilepsy or seizures. Clinical correlation is recommended.     Dru Pitts MD

## 2020-08-26 DIAGNOSIS — F41.9 ANXIETY: ICD-10-CM

## 2020-08-26 DIAGNOSIS — R42 DIZZINESS: ICD-10-CM

## 2020-08-27 RX ORDER — DIAZEPAM 10 MG/1
10 TABLET ORAL
Qty: 60 TAB | Refills: 2 | Status: SHIPPED | OUTPATIENT
Start: 2020-08-27 | End: 2021-06-10

## 2020-09-11 ENCOUNTER — OFFICE VISIT (OUTPATIENT)
Dept: NEUROLOGY | Age: 80
End: 2020-09-11
Payer: MEDICARE

## 2020-09-11 VITALS
OXYGEN SATURATION: 97 % | HEART RATE: 77 BPM | TEMPERATURE: 98.1 F | WEIGHT: 222 LBS | BODY MASS INDEX: 41.95 KG/M2 | DIASTOLIC BLOOD PRESSURE: 70 MMHG | SYSTOLIC BLOOD PRESSURE: 130 MMHG

## 2020-09-11 DIAGNOSIS — F33.0 MILD EPISODE OF RECURRENT MAJOR DEPRESSIVE DISORDER (HCC): ICD-10-CM

## 2020-09-11 DIAGNOSIS — G62.9 POLYNEUROPATHY: ICD-10-CM

## 2020-09-11 DIAGNOSIS — G45.9 TIA (TRANSIENT ISCHEMIC ATTACK): ICD-10-CM

## 2020-09-11 DIAGNOSIS — G43.009 MIGRAINE WITHOUT AURA AND WITHOUT STATUS MIGRAINOSUS, NOT INTRACTABLE: ICD-10-CM

## 2020-09-11 DIAGNOSIS — F41.1 GAD (GENERALIZED ANXIETY DISORDER): ICD-10-CM

## 2020-09-11 DIAGNOSIS — R42 DIZZINESS: Primary | ICD-10-CM

## 2020-09-11 DIAGNOSIS — M35.9 AUTOIMMUNE DISEASE (HCC): ICD-10-CM

## 2020-09-11 PROCEDURE — 1100F PTFALLS ASSESS-DOCD GE2>/YR: CPT | Performed by: PSYCHIATRY & NEUROLOGY

## 2020-09-11 PROCEDURE — G8417 CALC BMI ABV UP PARAM F/U: HCPCS | Performed by: PSYCHIATRY & NEUROLOGY

## 2020-09-11 PROCEDURE — G8427 DOCREV CUR MEDS BY ELIG CLIN: HCPCS | Performed by: PSYCHIATRY & NEUROLOGY

## 2020-09-11 PROCEDURE — G8400 PT W/DXA NO RESULTS DOC: HCPCS | Performed by: PSYCHIATRY & NEUROLOGY

## 2020-09-11 PROCEDURE — 3288F FALL RISK ASSESSMENT DOCD: CPT | Performed by: PSYCHIATRY & NEUROLOGY

## 2020-09-11 PROCEDURE — 99214 OFFICE O/P EST MOD 30 MIN: CPT | Performed by: PSYCHIATRY & NEUROLOGY

## 2020-09-11 PROCEDURE — 1090F PRES/ABSN URINE INCON ASSESS: CPT | Performed by: PSYCHIATRY & NEUROLOGY

## 2020-09-11 PROCEDURE — G8510 SCR DEP NEG, NO PLAN REQD: HCPCS | Performed by: PSYCHIATRY & NEUROLOGY

## 2020-09-11 PROCEDURE — G8536 NO DOC ELDER MAL SCRN: HCPCS | Performed by: PSYCHIATRY & NEUROLOGY

## 2020-09-11 RX ORDER — ESCITALOPRAM OXALATE 10 MG/1
10 TABLET ORAL DAILY
Qty: 30 TAB | Refills: 2 | Status: SHIPPED | OUTPATIENT
Start: 2020-09-11 | End: 2020-10-05 | Stop reason: SDUPTHER

## 2020-09-11 NOTE — PROGRESS NOTES
Neurology Progress Note    NAME:  Toñito Santo   :   1940   MRN:   [id number]     Date/Time:  2020  Subjective: Toñito Santo is a 68 y.o. female here today for follow-up for pain, numbness or tingling sensation leg pain and back pain, anxiety, neuropathy, improved, test results. Patient was accompanied by her sister today visit. She says her dizziness has improved, no blackout or confusion since the last visit. The sharp pain in the head has decreased a lot. She is still experiencing some weakness mostly in the left side of the body. The swelling has been controlled with Lasix. Patient noted that for some time now she is always very angry, agitated, decreased appetite and sleeping a little. She says she tends to direct her anger to her late . I told patient that this is because of the bereavement, however, as it is lasting longer close to a year, I will start patient on Lexapro and refer patient to psychiatry/psychology. Patient says she was unable to get up and move around because of the weakness. She was home by herself, she says she had to be in bed for nearly 4 days when she started gradually getting up and moving around. She noted that  once in a while she experiences a sharp head pain with dizziness but has not gotten any worse since. She says she monitors her blood pressure, and noted that the blood pressure appeared to be running low, systolic between 80 and 96, diastolic remains at 70. Patient to follow-up with wound care physician  MRI of the brain reviewed with patient  was unremarkable, there was no new lesion, old white matter disease. EEG was unremarkable  She says she occasionally experiences back pain, back pain is intermittent, sharp in nature, burning sensation that goes down to the legs causing weakness, numbness and tingling sensation of the legs.      Patient noted that she did some blood work with her rheumatologist, I am yet to see the results  She however denies any fall, dysphagia or odynophagia. Review of Systems - General ROS: positive for  - fatigue and sleep disturbance  Psychological ROS: positive for - anxiety, depression and sleep disturbances  Ophthalmic ROS: positive for - blurry vision and uses glasses  ENT ROS: positive for - headaches, tinnitus, vertigo and visual changes  Allergy and Immunology ROS: negative  Hematological and Lymphatic ROS: negative  Endocrine ROS: negative  Respiratory ROS: no cough, shortness of breath, or wheezing  Cardiovascular ROS: no chest pain or dyspnea on exertion  Gastrointestinal ROS: no abdominal pain, change in bowel habits, or black or bloody stools  Genito-Urinary ROS: no dysuria, trouble voiding, or hematuria  Musculoskeletal ROS: positive for - gait disturbance, joint pain, joint stiffness, joint swelling, muscle pain, muscular weakness and pain in back - bilateral and leg - bilateral  Neurological ROS: positive for - dizziness, gait disturbance, headaches, numbness/tingling and weakness  Dermatological ROS: negative       Medications reviewed:  Current Outpatient Medications   Medication Sig Dispense Refill    escitalopram oxalate (LEXAPRO) 10 mg tablet Take 1 Tab by mouth daily. 30 Tab 2    diazePAM (VALIUM) 10 mg tablet TAKE 1 TAB BY MOUTH EVERY TWELVE (12) HOURS AS NEEDED FOR ANXIETY. MAX DAILY AMOUNT: 20 MG. 60 Tab 2    tiotropium-olodateroL (STIOLTO RESPIMAT) 2.5-2.5 mcg/actuation inhaler Stiolto Respimat 2.5 mcg-2.5 mcg/actuation solution for inhalation      ascorbic acid (VITAMIN C PO) Take  by mouth as needed.  furosemide (LASIX) 20 mg tablet Take 1 Tab by mouth daily. 30 Tab 2    potassium chloride (KLOR-CON) 10 mEq tablet Take 1 Tab by mouth daily. 30 Tab 2    verapamil ER (VERELAN) 120 mg ER capsule TAKE 1 CAPSULE BY MOUTH EVERY DAY 30 Cap 3    folic acid (FOLVITE) 1 mg tablet Take 1 Tab by mouth daily.  30 Tab 3    OTHER Lumbar brace  Lumbar stenosis 1 Units 0    levothyroxine (SYNTHROID) 75 mcg tablet Take  by mouth daily. 0    cholecalciferol, vitamin D3, (VITAMIN D3) 2,000 unit tab Take  by mouth daily.  aspirin delayed-release 81 mg tablet Take  by mouth daily.  methotrexate (RHEUMATREX) 2.5 mg tablet Take  by mouth every Sunday. 6 capsules      Oxygen nightly. 2 liters      predniSONE (DELTASONE) 10 mg tablet TAKE 1 TABLET BY MOUTH TWICE A DAY 30 Tab 0    chlorthalidone (HYGROTEN) 25 mg tablet       clobetasol (TEMOVATE) 0.05 % topical cream apply to affected area twice a day externally  0    levomefolate-B6-meB12-algal oil (METANX, ALGAL OIL,) 3 mg-35 mg-2 mg -90.314 mg cap Take 1 Cap by mouth daily. 30 Cap 3    lisinopril-hydroCHLOROthiazide (PRINZIDE, ZESTORETIC) 10-12.5 mg per tablet take 1 tablet by mouth once daily  0    diclofenac (VOLTAREN) 1 % gel   0    scopolamine (TRANSDERM-SCOP) 1 mg over 3 days pt3d 1 Patch by TransDERmal route every seventy-two (72) hours. 10 Patch 2    pregabalin (LYRICA) 75 mg capsule Take 1 Cap by mouth two (2) times a day. Max Daily Amount: 150 mg. 60 Cap 1    nortriptyline (PAMELOR) 10 mg capsule Take 1 Cap by mouth nightly. 30 Cap 1    HYDROcodone-acetaminophen (NORCO) 7.5-325 mg per tablet Take 1 Tab by mouth every eight (8) hours as needed for Pain. Max Daily Amount: 3 Tabs. 15 Tab 0    valACYclovir (VALTREX) 1 gram tablet Take 1 Tab by mouth daily. 30 Tab 1    meclizine (ANTIVERT) 25 mg tablet Take 1 Tab by mouth nightly. 60 Tab 3    TIOTROPIUM BROMIDE (SPIRIVA WITH HANDIHALER IN) Take 2 Puffs by inhalation daily.  hydroxychloroquine (PLAQUENIL) 200 mg tablet Take 200 mg by mouth daily.           Objective:   Vitals:  Vitals:    09/11/20 0923   BP: 130/70   Pulse: 77   Temp: 98.1 °F (36.7 °C)   SpO2: 97%   Weight: 222 lb (100.7 kg)   PainSc:   0 - No pain       Lab Data Reviewed:  Lab Results   Component Value Date/Time    WBC 9.0 02/13/2019 11:52 AM    HCT 40.8 02/13/2019 11:52 AM    HGB 13.1 02/13/2019 11:52 AM PLATELET 254 97/57/6037 11:52 AM       No results found for: NA, K, CL, CO2, GLU, BUN, CREA, CA    No components found for: TROPQUANT    No results found for: PAMELA      No results found for: HBA1C, HGBE8, NHQ7BLDL, ESG3AGTK     Lab Results   Component Value Date/Time    Vitamin B12 426 09/11/2018 10:38 AM       No results found for: PAMELA, ANARX, ANAIGG, XBANA    No results found for: CHOL, CHOLPOCT, CHOLX, CHLST, CHOLV, HDL, HDLPOC, HDLP, LDL, LDLCPOC, LDLC, DLDLP, VLDLC, VLDL, TGLX, TRIGL, TRIGP, TGLPOCT, CHHD, CHHDX      CT Results (recent):  No results found for this or any previous visit. MRI Results (recent):  Results from [place] encounter on 07/20/20   MRI BRAIN W WO CONT    Narrative Clinical history: Dizziness and giddiness  INDICATION:   Dizziness and giddiness    COMPARISON: 5/16/2018, 4/19/2017  TECHNIQUE: MR examination of the brain includes axial and sagittal T1 , axial  T2, axial FLAIR, axial gradient echo, axial DWI, coronal T1 . Pre and post  contrast axial T1-weighted imaging. Postcontrast T1-weighted imaging coronal  plane. CONTRAST: The patient was administered gadolinium-based contrast material,  subsequently axial and sagittal T1-weighted postcontrast imaging was obtained. FINDINGS:   There is no intracranial mass, hemorrhage or acute infarction. Confluent periventricular and scattered foci of increased T2 signal intensity  corona radiata consistent  Not significantly changed. Trace fluid in the inferior mastoid air cell on the  left. IACs are symmetric in size. The left vertebral artery slightly larger than  the right. Mild mucoperiosteal thickening in the right and left maxillary  sinuses. . There is no abnormal parenchymal enhancement. There is no abnormal  meningeal enhancement demonstrated. The brain architecture is normal. There is  no evidence of midline shift or mass-effect. The ventricles are normal in size,  position and configuration.   There are no extra-axial fluid collections. Major  intracranial vascular flow-voids are unremarkable. The orbits are grossly  symmetric. Dural venous sinuses are grossly unremarkable. There is no Chiari or  syrinx. Pituitary and infundibulum grossly unremarkable. Cerebellopontine angles  are unremarkable. No skull base mass is identified. Cavernous sinuses are  symmetric. The mastoid air cells and are well pneumatized and clear. Impression IMPRESSION:  Mild/moderate chronic microvascular ischemic change. Trace paranasal sinus disease. No intracranial mass, hemorrhage or evidence of acute infarction. IR Results (recent):  No results found for this or any previous visit. VAS/US Results (recent):  No results found for this or any previous visit. PHYSICAL EXAM:  General:    Alert, cooperative, no distress, appears stated age. Head:   Normocephalic, without obvious abnormality, atraumatic. Eyes:   Conjunctivae/corneas clear. PERRLA  Nose:  Nares normal. No drainage or sinus tenderness. Throat:    Lips, mucosa, and tongue normal.  No Thrush  Neck:  Supple, symmetrical,  no adenopathy, thyroid: non tender    no carotid bruit and no JVD. Back:    Symmetric, bilateral tenderness. Lungs:   Clear to auscultation bilaterally. No Wheezing or Rhonchi. No rales. Chest wall:  No tenderness or deformity. No Accessory muscle use. Heart:   Regular rate and rhythm,  no murmur, rub or gallop. Abdomen:   Soft, non-tender. Not distended. Bowel sounds normal. No masses  Extremities: Extremities normal, atraumatic, No cyanosis. No edema. No clubbing  Skin:     Texture, turgor normal. No rashes or lesions. Not Jaundiced  Lymph nodes: Cervical, supraclavicular normal.  Psych:  Good insight. Depressed. Anxious. NEUROLOGICAL EXAM:  Appearance: The patient is well developed, well nourished, provides a coherent history and is in no acute distress. Mental Status: Oriented to time, place and person.  Mood and affect appropriate. Cranial Nerves:   Intact visual fields. Fundi are benign. GITA, EOM's full, no nystagmus, no ptosis. Facial sensation is normal. Corneal reflexes are intact. Facial movement is symmetric. Hearing is normal bilaterally. Palate is midline with normal sternocleidomastoid and trapezius muscles are normal. Tongue is midline. Motor:  5/5 strength in upper extremity and 5-/5 lower proximal and distal muscles. Normal bulk and tone. No fasciculations. Reflexes:   Deep tendon reflexes 2+/4 and symmetrical.   Sensory:    Dysesthesia to touch, pinprick and vibration. Gait:   Slightly unsteady gait. Tremor:    Mild tremor noted. Cerebellar:  No cerebellar signs present. Neurovascular:  Normal heart sounds and regular rhythm, peripheral pulses intact, and no carotid bruits. Assesment  1. Dizziness  Stable    2. Polyneuropathy  Continue management    3. Autoimmune disease [facility])  Following rheumatologist    4. Migraine without aura and without status migrainosus, not intractable  Continue management    5. POORNIMA (generalized anxiety disorder)    - REFERRAL TO PSYCHIATRY    6. TIA (transient ischemic attack)      7. Mild episode of recurrent major depressive disorder ([facility]    - REFERRAL TO PSYCHIATRY    ___________________________________________________  PLAN: Medication and plan discussed with patient      ICD-10-CM ICD-9-CM    1. Dizziness  R42 780.4    2. Polyneuropathy  G62.9 356.9    3. Autoimmune disease ([facility]  M35.9 279.49    4. Migraine without aura and without status migrainosus, not intractable  G43.009 346.10    5. POORNIMA (generalized anxiety disorder)  F41.1 300.02 REFERRAL TO PSYCHIATRY   6. TIA (transient ischemic attack)  G45.9 435.9    7. Mild episode of recurrent major depressive disorder (HCC)  F33.0 296.31 REFERRAL TO PSYCHIATRY     Follow-up and Dispositions    · Return in about 3 months (around 12/11/2020).            ___________________________________________________    Attending Physician: Jesus Sutton MD

## 2020-09-29 RX ORDER — POTASSIUM CHLORIDE 750 MG/1
TABLET, FILM COATED, EXTENDED RELEASE ORAL
Qty: 90 TAB | Refills: 0 | Status: SHIPPED | OUTPATIENT
Start: 2020-09-29 | End: 2020-12-31 | Stop reason: SDUPTHER

## 2020-09-30 RX ORDER — FUROSEMIDE 20 MG/1
TABLET ORAL
Qty: 90 TAB | Refills: 0 | Status: SHIPPED | OUTPATIENT
Start: 2020-09-30 | End: 2021-01-06 | Stop reason: SDUPTHER

## 2020-10-05 RX ORDER — ESCITALOPRAM OXALATE 10 MG/1
10 TABLET ORAL DAILY
Qty: 90 TAB | Refills: 1 | Status: SHIPPED | OUTPATIENT
Start: 2020-10-05 | End: 2020-10-09 | Stop reason: SDUPTHER

## 2020-10-05 NOTE — TELEPHONE ENCOUNTER
Last office visit 9/11/20, last office visit 12/14/20. [facility] requesting a 90 day supply.

## 2020-10-09 RX ORDER — ESCITALOPRAM OXALATE 10 MG/1
10 TABLET ORAL DAILY
Qty: 90 TAB | Refills: 1 | Status: SHIPPED | OUTPATIENT
Start: 2020-10-09 | End: 2020-12-14 | Stop reason: SDUPTHER

## 2020-10-09 NOTE — TELEPHONE ENCOUNTER
----- Message from Keegan Christensen Page sent at 10/9/2020 10:51 AM EDT -----  Regarding: Dr. Skylar Ruff Message/Vendor Calls    Caller's first and last name: Patient      Reason for call: Follow up on Escitalopram       Callback required yes/no and why: Yes. To discuss      Best contact number(s): [phone]      Details to clarify the request:  Patient is doing well on the medication.  Please send a rx to the [facility] on file if patientis to continue taking the medication      Jayne Cantrell

## 2020-10-14 DIAGNOSIS — G43.909 MIGRAINE WITHOUT STATUS MIGRAINOSUS, NOT INTRACTABLE, UNSPECIFIED MIGRAINE TYPE: ICD-10-CM

## 2020-10-14 RX ORDER — VERAPAMIL HYDROCHLORIDE 120 MG/1
CAPSULE, EXTENDED RELEASE ORAL
Qty: 90 CAP | Refills: 3 | Status: SHIPPED | OUTPATIENT
Start: 2020-10-14 | End: 2021-10-05

## 2020-12-14 ENCOUNTER — OFFICE VISIT (OUTPATIENT)
Dept: NEUROLOGY | Age: 80
End: 2020-12-14
Payer: MEDICARE

## 2020-12-14 VITALS — WEIGHT: 218 LBS | BODY MASS INDEX: 41.19 KG/M2

## 2020-12-14 DIAGNOSIS — F33.0 MILD EPISODE OF RECURRENT MAJOR DEPRESSIVE DISORDER (HCC): ICD-10-CM

## 2020-12-14 DIAGNOSIS — F41.1 GAD (GENERALIZED ANXIETY DISORDER): ICD-10-CM

## 2020-12-14 DIAGNOSIS — G45.9 TIA (TRANSIENT ISCHEMIC ATTACK): ICD-10-CM

## 2020-12-14 DIAGNOSIS — R42 DIZZINESS: ICD-10-CM

## 2020-12-14 DIAGNOSIS — G62.9 POLYNEUROPATHY: Primary | ICD-10-CM

## 2020-12-14 DIAGNOSIS — G43.909 MIGRAINE WITHOUT STATUS MIGRAINOSUS, NOT INTRACTABLE, UNSPECIFIED MIGRAINE TYPE: ICD-10-CM

## 2020-12-14 PROCEDURE — 99214 OFFICE O/P EST MOD 30 MIN: CPT | Performed by: PSYCHIATRY & NEUROLOGY

## 2020-12-14 RX ORDER — ESCITALOPRAM OXALATE 10 MG/1
10 TABLET ORAL DAILY
Qty: 90 TAB | Refills: 2 | Status: SHIPPED | OUTPATIENT
Start: 2020-12-14 | End: 2021-09-23 | Stop reason: SDUPTHER

## 2020-12-24 NOTE — PROGRESS NOTES
Neurology Progress Note    NAME:  Eleni Cohen   :   1940   MRN:   [id number]     Date/Time:  2020  Subjective: Eleni Cohen is a 68 y.o. female here today for follow-up for pain, numbness or tingling sensation leg pain and back pain, anxiety, neuropathy, improved, test results. Patient says he has been doing much better since the last visit, medication has been very helpful. She noted that with the Lexapro she has been less agitated or depressed  The sharp pain in the head has decreased a lot. She says is still going on to get around much better, due to COVID-19 pandemic crisis, she has limited places to go, however she tries to walk around her house. Weakness has improved and dizziness has greatly decreased  She noted that  once in a while she experiences a sharp head pain with dizziness but has not gotten any worse since. She says she continues to monitor her blood pressure which has been doing fairly well since the last visit. She says she occasionally experiences back pain, back pain is intermittent, sharp in nature, burning sensation that goes down to the legs causing weakness, numbness and tingling sensation of the legs. She however denies any fall, dysphagia or odynophagia.   Review of Systems - General ROS: positive for  - fatigue and sleep disturbance  Psychological ROS: positive for - anxiety, depression and sleep disturbances  Ophthalmic ROS: positive for - blurry vision and uses glasses  ENT ROS: positive for - headaches, tinnitus, vertigo and visual changes  Allergy and Immunology ROS: negative  Hematological and Lymphatic ROS: negative  Endocrine ROS: negative  Respiratory ROS: no cough, shortness of breath, or wheezing  Cardiovascular ROS: no chest pain or dyspnea on exertion  Gastrointestinal ROS: no abdominal pain, change in bowel habits, or black or bloody stools  Genito-Urinary ROS: no dysuria, trouble voiding, or hematuria  Musculoskeletal ROS: positive for - gait disturbance, joint pain, joint stiffness, joint swelling, muscle pain, muscular weakness and pain in back - bilateral and leg - bilateral  Neurological ROS: positive for - dizziness, gait disturbance, headaches, numbness/tingling and weakness  Dermatological ROS: negative       Medications reviewed:  Current Outpatient Medications   Medication Sig Dispense Refill    escitalopram oxalate (LEXAPRO) 10 mg tablet Take 1 Tab by mouth daily. 90 Tab 2    verapamil ER (VERELAN) 120 mg ER capsule TAKE 1 CAPSULE BY MOUTH EVERY DAY 90 Cap 3    furosemide (LASIX) 20 mg tablet TAKE 1 TABLET BY MOUTH EVERY DAY 90 Tab 0    potassium chloride SR (KLOR-CON 10) 10 mEq tablet TAKE 1 TABLET BY MOUTH EVERY DAY 90 Tab 0    diazePAM (VALIUM) 10 mg tablet TAKE 1 TAB BY MOUTH EVERY TWELVE (12) HOURS AS NEEDED FOR ANXIETY. MAX DAILY AMOUNT: 20 MG. 60 Tab 2    tiotropium-olodateroL (STIOLTO RESPIMAT) 2.5-2.5 mcg/actuation inhaler Stiolto Respimat 2.5 mcg-2.5 mcg/actuation solution for inhalation      folic acid (FOLVITE) 1 mg tablet Take 1 Tab by mouth daily. 30 Tab 3    OTHER Lumbar brace  Lumbar stenosis 1 Units 0    levothyroxine (SYNTHROID) 75 mcg tablet Take  by mouth daily. 0    cholecalciferol, vitamin D3, (VITAMIN D3) 2,000 unit tab Take  by mouth daily.  aspirin delayed-release 81 mg tablet Take  by mouth daily.  hydroxychloroquine (PLAQUENIL) 200 mg tablet Take 200 mg by mouth daily.  methotrexate (RHEUMATREX) 2.5 mg tablet Take  by mouth every Sunday. 6 capsules      Oxygen nightly. 2 liters      ascorbic acid (VITAMIN C PO) Take  by mouth as needed.  predniSONE (DELTASONE) 10 mg tablet TAKE 1 TABLET BY MOUTH TWICE A DAY 30 Tab 0    chlorthalidone (HYGROTEN) 25 mg tablet       clobetasol (TEMOVATE) 0.05 % topical cream apply to affected area twice a day externally  0    levomefolate-B6-meB12-algal oil (METANX, ALGAL OIL,) 3 mg-35 mg-2 mg -90.314 mg cap Take 1 Cap by mouth daily.  30 Cap 3    lisinopril-hydroCHLOROthiazide (PRINZIDE, ZESTORETIC) 10-12.5 mg per tablet take 1 tablet by mouth once daily  0    diclofenac (VOLTAREN) 1 % gel   0    scopolamine (TRANSDERM-SCOP) 1 mg over 3 days pt3d 1 Patch by TransDERmal route every seventy-two (72) hours. 10 Patch 2    pregabalin (LYRICA) 75 mg capsule Take 1 Cap by mouth two (2) times a day. Max Daily Amount: 150 mg. 60 Cap 1    nortriptyline (PAMELOR) 10 mg capsule Take 1 Cap by mouth nightly. 30 Cap 1    HYDROcodone-acetaminophen (NORCO) 7.5-325 mg per tablet Take 1 Tab by mouth every eight (8) hours as needed for Pain. Max Daily Amount: 3 Tabs. 15 Tab 0    valACYclovir (VALTREX) 1 gram tablet Take 1 Tab by mouth daily. 30 Tab 1    meclizine (ANTIVERT) 25 mg tablet Take 1 Tab by mouth nightly. 60 Tab 3    TIOTROPIUM BROMIDE (SPIRIVA WITH HANDIHALER IN) Take 2 Puffs by inhalation daily. Objective:   Vitals:  Vitals:    12/14/20 1116   Weight: 218 lb (98.9 kg)               Lab Data Reviewed:  Lab Results   Component Value Date/Time    WBC 9.0 02/13/2019 11:52 AM    HCT 40.8 02/13/2019 11:52 AM    HGB 13.1 02/13/2019 11:52 AM    PLATELET 525 74/75/0725 11:52 AM       No results found for: NA, K, CL, CO2, GLU, BUN, CREA, CA    No components found for: TROPQUANT    No results found for: PAMELA      No results found for: HBA1C, HGBE8, OWH1XZCP, QVC5FARU     Lab Results   Component Value Date/Time    Vitamin B12 426 09/11/2018 10:38 AM       No results found for: PAMELA, ANARX, ANAIGG, XBANA    No results found for: CHOL, CHOLPOCT, CHOLX, CHLST, CHOLV, HDL, HDLPOC, HDLP, LDL, LDLCPOC, LDLC, DLDLP, VLDLC, VLDL, TGLX, TRIGL, TRIGP, TGLPOCT, CHHD, CHHDX      CT Results (recent):  No results found for this or any previous visit.     MRI Results (recent):  Results from [place] encounter on 07/20/20   MRI BRAIN W WO CONT    Narrative Clinical history: Dizziness and giddiness  INDICATION:   Dizziness and giddiness    COMPARISON: 5/16/2018, 4/19/2017  TECHNIQUE: MR examination of the brain includes axial and sagittal T1 , axial  T2, axial FLAIR, axial gradient echo, axial DWI, coronal T1 . Pre and post  contrast axial T1-weighted imaging. Postcontrast T1-weighted imaging coronal  plane. CONTRAST: The patient was administered gadolinium-based contrast material,  subsequently axial and sagittal T1-weighted postcontrast imaging was obtained. FINDINGS:   There is no intracranial mass, hemorrhage or acute infarction. Confluent periventricular and scattered foci of increased T2 signal intensity  corona radiata consistent  Not significantly changed. Trace fluid in the inferior mastoid air cell on the  left. IACs are symmetric in size. The left vertebral artery slightly larger than  the right. Mild mucoperiosteal thickening in the right and left maxillary  sinuses. . There is no abnormal parenchymal enhancement. There is no abnormal  meningeal enhancement demonstrated. The brain architecture is normal. There is  no evidence of midline shift or mass-effect. The ventricles are normal in size,  position and configuration. There are no extra-axial fluid collections. Major  intracranial vascular flow-voids are unremarkable. The orbits are grossly  symmetric. Dural venous sinuses are grossly unremarkable. There is no Chiari or  syrinx. Pituitary and infundibulum grossly unremarkable. Cerebellopontine angles  are unremarkable. No skull base mass is identified. Cavernous sinuses are  symmetric. The mastoid air cells and are well pneumatized and clear. Impression IMPRESSION:  Mild/moderate chronic microvascular ischemic change. Trace paranasal sinus disease. No intracranial mass, hemorrhage or evidence of acute infarction. IR Results (recent):  No results found for this or any previous visit. VAS/US Results (recent):  No results found for this or any previous visit.     PHYSICAL EXAM:  Deferred      NEUROLOGICAL EXAM:  Deferred  Assesment  1. Polyneuropathy  Continue management    2. Migraine without status migrainosus, not intractable, unspecified migraine type  Stable    3. Dizziness  Stable    4. TIA (transient ischemic attack)  Stable    5. POORNIMA (generalized anxiety disorder)  Continue management    6. Mild episode of recurrent major depressive disorder ([address]  Continue management    ___________________________________________________  PLAN:      ICD-10-CM ICD-9-CM    1. Polyneuropathy  G62.9 356.9    2. Migraine without status migrainosus, not intractable, unspecified migraine type  G43.909 346.90    3. Dizziness  R42 780.4    4. TIA (transient ischemic attack)  G45.9 435.9    5. POORNIMA (generalized anxiety disorder)  F41.1 300.02    6. Mild episode of recurrent major depressive disorder (HCC)  F33.0 296.31      Follow-up and Dispositions    · Return in about 3 months (around 3/14/2021).        About 25 minutes was spent on this visit, more than half counseling    ___________________________________________________    Attending Physician: Megha Reveles MD

## 2020-12-31 RX ORDER — POTASSIUM CHLORIDE 750 MG/1
TABLET, FILM COATED, EXTENDED RELEASE ORAL
Qty: 90 TAB | Refills: 0 | Status: SHIPPED | OUTPATIENT
Start: 2020-12-31 | End: 2022-03-14 | Stop reason: SDUPTHER

## 2021-01-06 RX ORDER — FUROSEMIDE 20 MG/1
TABLET ORAL
Qty: 90 TAB | Refills: 0 | Status: SHIPPED | OUTPATIENT
Start: 2021-01-06 | End: 2021-04-05 | Stop reason: SDUPTHER

## 2021-02-03 ENCOUNTER — TRANSCRIBE ORDER (OUTPATIENT)
Dept: SCHEDULING | Age: 81
End: 2021-02-03

## 2021-02-03 DIAGNOSIS — H92.02 ACUTE OTALGIA, LEFT: ICD-10-CM

## 2021-02-03 DIAGNOSIS — M54.2 CERVICALGIA: Primary | ICD-10-CM

## 2021-02-08 ENCOUNTER — HOSPITAL ENCOUNTER (OUTPATIENT)
Dept: CT IMAGING | Age: 81
Discharge: HOME | End: 2021-02-08
Attending: OTOLARYNGOLOGY
Payer: MEDICARE

## 2021-02-08 DIAGNOSIS — H92.02 ACUTE OTALGIA, LEFT: ICD-10-CM

## 2021-02-08 DIAGNOSIS — M54.2 CERVICALGIA: ICD-10-CM

## 2021-02-08 LAB — CREAT BLD-MCNC: 0.8 MG/DL (ref 0.6–1.3)

## 2021-02-08 PROCEDURE — 74011000636 HC RX REV CODE- 636: Performed by: OTOLARYNGOLOGY

## 2021-02-08 PROCEDURE — 70491 CT SOFT TISSUE NECK W/DYE: CPT

## 2021-02-08 PROCEDURE — 82565 ASSAY OF CREATININE: CPT

## 2021-02-08 RX ADMIN — IOPAMIDOL 100 ML: 755 INJECTION, SOLUTION INTRAVENOUS at 19:00

## 2021-03-29 ENCOUNTER — OFFICE VISIT (OUTPATIENT)
Dept: NEUROLOGY | Age: 81
End: 2021-03-29
Payer: MEDICARE

## 2021-03-29 VITALS
RESPIRATION RATE: 16 BRPM | DIASTOLIC BLOOD PRESSURE: 82 MMHG | HEIGHT: 61 IN | TEMPERATURE: 97.7 F | OXYGEN SATURATION: 96 % | WEIGHT: 220 LBS | SYSTOLIC BLOOD PRESSURE: 132 MMHG | BODY MASS INDEX: 41.54 KG/M2 | HEART RATE: 70 BPM

## 2021-03-29 DIAGNOSIS — R42 DIZZINESS: ICD-10-CM

## 2021-03-29 DIAGNOSIS — G62.9 POLYNEUROPATHY: ICD-10-CM

## 2021-03-29 DIAGNOSIS — G89.29 CHRONIC BILATERAL LOW BACK PAIN WITHOUT SCIATICA: ICD-10-CM

## 2021-03-29 DIAGNOSIS — R20.2 PARESTHESIA: ICD-10-CM

## 2021-03-29 DIAGNOSIS — G52.1 NEURALGIA OF LEFT GLOSSOPHARYNGEAL NERVE: Primary | ICD-10-CM

## 2021-03-29 DIAGNOSIS — M54.50 CHRONIC BILATERAL LOW BACK PAIN WITHOUT SCIATICA: ICD-10-CM

## 2021-03-29 PROCEDURE — G8417 CALC BMI ABV UP PARAM F/U: HCPCS | Performed by: PSYCHIATRY & NEUROLOGY

## 2021-03-29 PROCEDURE — 99214 OFFICE O/P EST MOD 30 MIN: CPT | Performed by: PSYCHIATRY & NEUROLOGY

## 2021-03-29 PROCEDURE — 1101F PT FALLS ASSESS-DOCD LE1/YR: CPT | Performed by: PSYCHIATRY & NEUROLOGY

## 2021-03-29 PROCEDURE — G8400 PT W/DXA NO RESULTS DOC: HCPCS | Performed by: PSYCHIATRY & NEUROLOGY

## 2021-03-29 PROCEDURE — G8427 DOCREV CUR MEDS BY ELIG CLIN: HCPCS | Performed by: PSYCHIATRY & NEUROLOGY

## 2021-03-29 PROCEDURE — G8510 SCR DEP NEG, NO PLAN REQD: HCPCS | Performed by: PSYCHIATRY & NEUROLOGY

## 2021-03-29 PROCEDURE — 1090F PRES/ABSN URINE INCON ASSESS: CPT | Performed by: PSYCHIATRY & NEUROLOGY

## 2021-03-29 PROCEDURE — G8536 NO DOC ELDER MAL SCRN: HCPCS | Performed by: PSYCHIATRY & NEUROLOGY

## 2021-03-29 RX ORDER — CARBAMAZEPINE 100 MG/1
100 TABLET, EXTENDED RELEASE ORAL 2 TIMES DAILY
Qty: 60 TAB | Refills: 1 | Status: SHIPPED | OUTPATIENT
Start: 2021-03-29 | End: 2021-05-20

## 2021-03-29 NOTE — PROGRESS NOTES
Neurology Progress Note    NAME:  Mansoor Olvera   :   1940   MRN:   [id number]     Date/Time:  3/29/2021  Subjective: Mansoor Olvera is a 80 y.o. female here today for follow-up for pain, numbness or tingling sensation leg pain and back pain, anxiety, neuropathy, improved. Patient was accompanied by  her sister to the visit. Patient says she experienced sharp pain in the left ear that that was going to the face, coming intermittently, initially she thought it was infection in the ear, went to her PCP then to ENT but nothing was  found. She was treated with steroids and antibiotic. She says is improving at this time. This patient most likely having glossopharyngeal neuralgia which I explained to patient. Patient says she is still doing  fairly well, however has weakness of the lower extremity but has not had any fall. She says the medication have helped her to adjust, her anxiety has decreased  I will refer patient to physical therapy  The sharp pain in the head has decreased a lot. She says  once in a while she experiences a sharp head pain with dizziness but has not gotten any worse since. She says she continues to monitor her blood pressure which has been doing fairly well since the last visit. She says she occasionally experiences back pain, back pain is intermittent, sharp in nature, burning sensation that goes down to the legs causing weakness, numbness and tingling sensation of the legs. She however denies any fall, dysphagia or odynophagia.   Review of Systems - General ROS: positive for  - fatigue and sleep disturbance  Psychological ROS: positive for - anxiety, depression and sleep disturbances  Ophthalmic ROS: positive for - blurry vision and uses glasses  ENT ROS: positive for - headaches, tinnitus, vertigo and visual changes  Allergy and Immunology ROS: negative  Hematological and Lymphatic ROS: negative  Endocrine ROS: negative  Respiratory ROS: no cough, shortness of breath, or wheezing  Cardiovascular ROS: no chest pain or dyspnea on exertion  Gastrointestinal ROS: no abdominal pain, change in bowel habits, or black or bloody stools  Genito-Urinary ROS: no dysuria, trouble voiding, or hematuria  Musculoskeletal ROS: positive for - gait disturbance, joint pain, joint stiffness, joint swelling, muscle pain, muscular weakness and pain in back - bilateral and leg - bilateral  Neurological ROS: positive for - dizziness, gait disturbance, headaches, numbness/tingling and weakness  Dermatological ROS: negative      Medications reviewed:  Current Outpatient Medications   Medication Sig Dispense Refill    furosemide (LASIX) 20 mg tablet TAKE 1 TABLET BY MOUTH EVERY DAY 90 Tab 0    potassium chloride SR (KLOR-CON 10) 10 mEq tablet TAKE 1 TABLET BY MOUTH EVERY DAY 90 Tab 0    escitalopram oxalate (LEXAPRO) 10 mg tablet Take 1 Tab by mouth daily. 90 Tab 2    verapamil ER (VERELAN) 120 mg ER capsule TAKE 1 CAPSULE BY MOUTH EVERY DAY 90 Cap 3    diazePAM (VALIUM) 10 mg tablet TAKE 1 TAB BY MOUTH EVERY TWELVE (12) HOURS AS NEEDED FOR ANXIETY. MAX DAILY AMOUNT: 20 MG. 60 Tab 2    ascorbic acid (VITAMIN C PO) Take  by mouth as needed.  chlorthalidone (HYGROTEN) 25 mg tablet       folic acid (FOLVITE) 1 mg tablet Take 1 Tab by mouth daily. 30 Tab 3    OTHER Lumbar brace  Lumbar stenosis 1 Units 0    lisinopril-hydroCHLOROthiazide (PRINZIDE, ZESTORETIC) 10-12.5 mg per tablet take 1 tablet by mouth once daily  0    nortriptyline (PAMELOR) 10 mg capsule Take 1 Cap by mouth nightly. 30 Cap 1    HYDROcodone-acetaminophen (NORCO) 7.5-325 mg per tablet Take 1 Tab by mouth every eight (8) hours as needed for Pain. Max Daily Amount: 3 Tabs. 15 Tab 0    levothyroxine (SYNTHROID) 75 mcg tablet Take  by mouth daily. 0    cholecalciferol, vitamin D3, (VITAMIN D3) 2,000 unit tab Take  by mouth daily.  TIOTROPIUM BROMIDE (SPIRIVA WITH HANDIHALER IN) Take 2 Puffs by inhalation daily.       aspirin delayed-release 81 mg tablet Take  by mouth daily.  hydroxychloroquine (PLAQUENIL) 200 mg tablet Take 200 mg by mouth daily.  methotrexate (RHEUMATREX) 2.5 mg tablet Take  by mouth every Sunday. 6 capsules      Oxygen nightly. 2 liters      tiotropium-olodateroL (STIOLTO RESPIMAT) 2.5-2.5 mcg/actuation inhaler Stiolto Respimat 2.5 mcg-2.5 mcg/actuation solution for inhalation      predniSONE (DELTASONE) 10 mg tablet TAKE 1 TABLET BY MOUTH TWICE A DAY 30 Tab 0    clobetasol (TEMOVATE) 0.05 % topical cream apply to affected area twice a day externally  0    levomefolate-B6-meB12-algal oil (METANX, ALGAL OIL,) 3 mg-35 mg-2 mg -90.314 mg cap Take 1 Cap by mouth daily. 30 Cap 3    diclofenac (VOLTAREN) 1 % gel   0    scopolamine (TRANSDERM-SCOP) 1 mg over 3 days pt3d 1 Patch by TransDERmal route every seventy-two (72) hours. 10 Patch 2    pregabalin (LYRICA) 75 mg capsule Take 1 Cap by mouth two (2) times a day. Max Daily Amount: 150 mg. 60 Cap 1    valACYclovir (VALTREX) 1 gram tablet Take 1 Tab by mouth daily. 30 Tab 1    meclizine (ANTIVERT) 25 mg tablet Take 1 Tab by mouth nightly.  60 Tab 3        Objective:   Vitals:  Vitals:    03/29/21 1022   BP: 132/82   Pulse: 70   Resp: 16   Temp: 97.7 °F (36.5 °C)   TempSrc: Temporal   SpO2: 96%   Weight: 220 lb (99.8 kg)   Height: 5' 1\" (1.549 m)   PainSc:   0 - No pain               Lab Data Reviewed:  Lab Results   Component Value Date/Time    WBC 9.0 02/13/2019 11:52 AM    HCT 40.8 02/13/2019 11:52 AM    HGB 13.1 02/13/2019 11:52 AM    PLATELET 495 69/99/6532 11:52 AM       No results found for: NA, K, CL, CO2, GLU, BUN, CREA, CA    No components found for: TROPQUANT    No results found for: PAMELA      No results found for: HBA1C, HGBE8, FBD3LTIH, UKG5YPLM     Lab Results   Component Value Date/Time    Vitamin B12 426 09/11/2018 10:38 AM       No results found for: PAMELA, ANARX, ANAIGG, XBANA    No results found for: CHOL, CHOLPOCT, CHOLX, CHLST, CHOLV, HDL, HDLPOC, HDLP, LDL, LDLCPOC, LDLC, DLDLP, VLDLC, VLDL, TGLX, TRIGL, TRIGP, TGLPOCT, CHHD, CHHDX      CT Results (recent):  Results from Hospital Encounter encounter on 02/08/21   CT NECK SOFT TISSUE W CONT    Narrative EXAM:  CT NECK SOFT TISSUE W CONT    CLINICAL INFORMATION: Acute left otalgia  COMPARISON:  Brain MRI of 7/20/2020     TECHNIQUE: Axial neck CT was performed  following the IV injection of 100 cc  Isovue-370. Coronal  and sagittal and sagittal reformatted images were  generated. CT dose reduction was achieved through the use of a standardized  protocol tailored for this examination and automatic exposure control for dose  modulation. FINDINGS:    VISUALIZED ORBITS, PARANASAL SINUSES, AND SKULL BASE: No abnormalities in the  visualized portion of the brain. Skull base intact. Well-circumscribed  extraconal mass in the inferior left orbit measuring approximately 11 mm AP, 14  mm transverse, and 8 mm craniocaudad. This can be identified retrospectively on  the previous brain MRIs and appeared cystic, without change since 2017. This is  likely benign and incidental. Mastoid air cells and tympanic cavities appear  clear. NASOPHARYNX:  Within normal limits. SUPRAHYOID NECK:  No significant abnormalities in the  oropharynx, oral cavity,  parapharyngeal space, and retropharyngeal space. INFRAHYOID NECK:  Normal appearing larynx and  hypopharynx. THYROID:  No nodule. SALIVARY GLANDS: Within normal limits. THORACIC INLET: No adenopathy. Focal pleural-based density in the anterior left  upper lobe possible area of parenchymal scarring although a focal mass cannot be  excluded. Dimensions of approximately 1.8 x 1.2 x 1.1 cm. Lung apices otherwise  clear. LYMPH NODES: No lymph node enlargement or heterogeneity. VASCULAR STRUCTURES:  Patent. BONES: No significant abnormalities. OTHER FINDINGS:  None. Impression 1. No etiology of left otalgia demonstrated.   2. Cystic lesion in the extraconal inferior left orbit, likely incidental.  3. Irregular density in the anterior left upper lobe, not characterized on this  study. Comparison with any prior cross-sectional imaging of the chest is  recommended. If none is available, further evaluation is recommended. This could  be performed with PET CT. 23X           MRI Results (recent):  Results from [place] encounter on 07/20/20   MRI BRAIN W WO CONT    Narrative Clinical history: Dizziness and giddiness  INDICATION:   Dizziness and giddiness    COMPARISON: 5/16/2018, 4/19/2017  TECHNIQUE: MR examination of the brain includes axial and sagittal T1 , axial  T2, axial FLAIR, axial gradient echo, axial DWI, coronal T1 . Pre and post  contrast axial T1-weighted imaging. Postcontrast T1-weighted imaging coronal  plane. CONTRAST: The patient was administered gadolinium-based contrast material,  subsequently axial and sagittal T1-weighted postcontrast imaging was obtained. FINDINGS:   There is no intracranial mass, hemorrhage or acute infarction. Confluent periventricular and scattered foci of increased T2 signal intensity  corona radiata consistent  Not significantly changed. Trace fluid in the inferior mastoid air cell on the  left. IACs are symmetric in size. The left vertebral artery slightly larger than  the right. Mild mucoperiosteal thickening in the right and left maxillary  sinuses. . There is no abnormal parenchymal enhancement. There is no abnormal  meningeal enhancement demonstrated. The brain architecture is normal. There is  no evidence of midline shift or mass-effect. The ventricles are normal in size,  position and configuration. There are no extra-axial fluid collections. Major  intracranial vascular flow-voids are unremarkable. The orbits are grossly  symmetric. Dural venous sinuses are grossly unremarkable. There is no Chiari or  syrinx. Pituitary and infundibulum grossly unremarkable.  Cerebellopontine angles  are unremarkable. No skull base mass is identified. Cavernous sinuses are  symmetric. The mastoid air cells and are well pneumatized and clear. Impression IMPRESSION:  Mild/moderate chronic microvascular ischemic change. Trace paranasal sinus disease. No intracranial mass, hemorrhage or evidence of acute infarction. IR Results (recent):  No results found for this or any previous visit. VAS/US Results (recent):  No results found for this or any previous visit. PHYSICAL EXAM:  General:    Alert, cooperative, no distress, appears stated age. Head:   Normocephalic, without obvious abnormality, atraumatic. Eyes:   Conjunctivae/corneas clear. PERRLA  Nose:  Nares normal. No drainage or sinus tenderness. Throat:    Lips, mucosa, and tongue normal.  No Thrush  Neck:  Supple, symmetrical,  no adenopathy, thyroid: non tender    no carotid bruit and no JVD. Back:    Symmetric, bilateral tenderness. Lungs:   Clear to auscultation bilaterally. No Wheezing or Rhonchi. No rales. Chest wall:  No tenderness or deformity. No Accessory muscle use. Heart:   Regular rate and rhythm,  no murmur, rub or gallop. Abdomen:   Soft, non-tender. Not distended. Bowel sounds normal. No masses  Extremities: Extremities normal, atraumatic, No cyanosis. No edema. No clubbing  Skin:     Texture, turgor normal. No rashes or lesions. Not Jaundiced  Lymph nodes: Cervical, supraclavicular normal.  Psych:  Good insight. Not depressed. Anxious. NEUROLOGICAL EXAM:  Appearance: The patient is well developed, well nourished, provides a coherent history and is in no acute distress. Mental Status: Oriented to time, place and person. Mood and affect appropriate. Cranial Nerves:   Intact visual fields. Fundi are benign. GITA, EOM's full, no nystagmus, no ptosis. Facial sensation is normal. Corneal reflexes are intact. Facial movement is symmetric. Hearing is normal bilaterally.  Palate is midline with normal sternocleidomastoid and trapezius muscles are normal. Tongue is midline. Motor:  5/5 strength in upper extremity and 5-/5 lower extremity proximal and distal muscles. Normal bulk and tone. No fasciculations. Reflexes:   Deep tendon reflexes 2+/4 and symmetrical.   Sensory:    Decreased sensation  to touch, pinprick and vibration bilateral lower extremity up to the knee upper extremity in glove distribution. Gait:  Normal gait. Tremor:    Mild tremor noted. Cerebellar:  No cerebellar signs present. Neurovascular:  Normal heart sounds and regular rhythm, peripheral pulses intact, and no carotid bruits. Assesment  1. Neuralgia of left glossopharyngeal nerve  Continue Neurontin    2. Polyneuropathy    Neurontin    3. Dizziness  Stable    4. Chronic bilateral low back pain without sciatica  Referred to physical therapy    5. Paresthesia  Stable    ___________________________________________________  PLAN: Medication and plan discussed with patient and her sister      ICD-10-CM ICD-9-CM    1. Neuralgia of left glossopharyngeal nerve  G52.1 352.1    2. Polyneuropathy  G62.9 356.9    3. Dizziness  R42 780.4    4. Chronic bilateral low back pain without sciatica  M54.5 724.2     G89.29 338.29    5. Paresthesia  R20.2 782.0      Follow-up and Dispositions    · Return in about 6 weeks (around 5/10/2021).                  ___________________________________________________    Attending Physician: Shade Cheung MD

## 2021-03-29 NOTE — PROGRESS NOTES
Identified pt with two pt identifiers(name and ). Reviewed record in preparation for visit and have obtained necessary documentation. Chief Complaint   Patient presents with    Follow-up     3 month    Migraine     last 2 weeks ago; 2/month    Ear Pain     started 2020; saw pcp/ent/pt/cat scan/eye doctor; cat scan showed nerve behind left ear abnormality      Vitals:    21 1022   BP: 132/82   Pulse: 70   Resp: 16   Temp: 97.7 °F (36.5 °C)   TempSrc: Temporal   SpO2: 96%   Weight: 220 lb (99.8 kg)   Height: 5' 1\" (1.549 m)   PainSc:   0 - No pain       Health Maintenance Review: Patient reminded of \"due or due soon\" health maintenance. I have asked the patient to contact his/her primary care provider (PCP) for follow-up on his/her health maintenance. Coordination of Care Questionnaire:  :   1) Have you been to an emergency room, urgent care, or hospitalized since your last visit? If yes, where when, and reason for visit? no       2. Have seen or consulted any other health care provider since your last visit? If yes, where when, and reason for visit? NO      Patient is accompanied by self and friend I have received verbal consent from Sivakumar Patel to discuss any/all medical information while they are present in the room.

## 2021-04-06 RX ORDER — FUROSEMIDE 20 MG/1
TABLET ORAL
Qty: 90 TAB | Refills: 0 | Status: SHIPPED | OUTPATIENT
Start: 2021-04-06 | End: 2021-05-11

## 2021-04-28 ENCOUNTER — TRANSCRIBE ORDER (OUTPATIENT)
Dept: SCHEDULING | Age: 81
End: 2021-04-28

## 2021-04-28 DIAGNOSIS — R91.1 PULMONARY NODULE: Primary | ICD-10-CM

## 2021-05-11 ENCOUNTER — OFFICE VISIT (OUTPATIENT)
Dept: NEUROLOGY | Age: 81
End: 2021-05-11
Payer: MEDICARE

## 2021-05-11 VITALS
OXYGEN SATURATION: 96 % | SYSTOLIC BLOOD PRESSURE: 145 MMHG | BODY MASS INDEX: 41.27 KG/M2 | HEART RATE: 71 BPM | RESPIRATION RATE: 18 BRPM | WEIGHT: 218.4 LBS | DIASTOLIC BLOOD PRESSURE: 83 MMHG

## 2021-05-11 DIAGNOSIS — G62.9 POLYNEUROPATHY: Primary | ICD-10-CM

## 2021-05-11 DIAGNOSIS — M54.50 CHRONIC BILATERAL LOW BACK PAIN WITHOUT SCIATICA: ICD-10-CM

## 2021-05-11 DIAGNOSIS — G52.1 NEURALGIA OF LEFT GLOSSOPHARYNGEAL NERVE: ICD-10-CM

## 2021-05-11 DIAGNOSIS — F41.1 GAD (GENERALIZED ANXIETY DISORDER): ICD-10-CM

## 2021-05-11 DIAGNOSIS — R20.2 PARESTHESIA: ICD-10-CM

## 2021-05-11 DIAGNOSIS — G43.909 MIGRAINE WITHOUT STATUS MIGRAINOSUS, NOT INTRACTABLE, UNSPECIFIED MIGRAINE TYPE: ICD-10-CM

## 2021-05-11 DIAGNOSIS — G89.29 CHRONIC BILATERAL LOW BACK PAIN WITHOUT SCIATICA: ICD-10-CM

## 2021-05-11 PROCEDURE — G8400 PT W/DXA NO RESULTS DOC: HCPCS | Performed by: PSYCHIATRY & NEUROLOGY

## 2021-05-11 PROCEDURE — G8427 DOCREV CUR MEDS BY ELIG CLIN: HCPCS | Performed by: PSYCHIATRY & NEUROLOGY

## 2021-05-11 PROCEDURE — 1101F PT FALLS ASSESS-DOCD LE1/YR: CPT | Performed by: PSYCHIATRY & NEUROLOGY

## 2021-05-11 PROCEDURE — G8536 NO DOC ELDER MAL SCRN: HCPCS | Performed by: PSYCHIATRY & NEUROLOGY

## 2021-05-11 PROCEDURE — G8432 DEP SCR NOT DOC, RNG: HCPCS | Performed by: PSYCHIATRY & NEUROLOGY

## 2021-05-11 PROCEDURE — 1090F PRES/ABSN URINE INCON ASSESS: CPT | Performed by: PSYCHIATRY & NEUROLOGY

## 2021-05-11 PROCEDURE — G8417 CALC BMI ABV UP PARAM F/U: HCPCS | Performed by: PSYCHIATRY & NEUROLOGY

## 2021-05-11 PROCEDURE — 99213 OFFICE O/P EST LOW 20 MIN: CPT | Performed by: PSYCHIATRY & NEUROLOGY

## 2021-05-11 RX ORDER — FUROSEMIDE 20 MG/1
TABLET ORAL
Qty: 90 TAB | Refills: 0 | Status: SHIPPED | OUTPATIENT
Start: 2021-05-11 | End: 2021-06-14 | Stop reason: SDUPTHER

## 2021-05-20 RX ORDER — CARBAMAZEPINE 100 MG/1
TABLET, EXTENDED RELEASE ORAL
Qty: 60 TABLET | Refills: 1 | Status: SHIPPED | OUTPATIENT
Start: 2021-05-20 | End: 2021-06-14 | Stop reason: SDUPTHER

## 2021-06-10 DIAGNOSIS — R42 DIZZINESS: ICD-10-CM

## 2021-06-10 DIAGNOSIS — F41.9 ANXIETY: ICD-10-CM

## 2021-06-10 RX ORDER — DIAZEPAM 10 MG/1
TABLET ORAL
Qty: 60 TABLET | Refills: 2 | Status: SHIPPED | OUTPATIENT
Start: 2021-06-10 | End: 2022-01-24 | Stop reason: SDUPTHER

## 2021-06-14 RX ORDER — FUROSEMIDE 20 MG/1
20 TABLET ORAL DAILY
Qty: 90 TABLET | Refills: 0 | Status: SHIPPED | OUTPATIENT
Start: 2021-06-14 | End: 2021-08-05

## 2021-06-14 RX ORDER — CARBAMAZEPINE 100 MG/1
TABLET, EXTENDED RELEASE ORAL
Qty: 60 TABLET | Refills: 1 | Status: SHIPPED | OUTPATIENT
Start: 2021-06-14 | End: 2022-01-31

## 2021-06-23 ENCOUNTER — HOSPITAL ENCOUNTER (OUTPATIENT)
Dept: CT IMAGING | Age: 81
Discharge: HOME | End: 2021-06-23
Attending: INTERNAL MEDICINE
Payer: MEDICARE

## 2021-06-23 DIAGNOSIS — R91.1 PULMONARY NODULE: ICD-10-CM

## 2021-06-23 PROCEDURE — 71250 CT THORAX DX C-: CPT

## 2021-08-04 ENCOUNTER — TRANSCRIBE ORDER (OUTPATIENT)
Dept: SCHEDULING | Age: 81
End: 2021-08-04

## 2021-08-04 DIAGNOSIS — R91.1 PULMONARY NODULE: Primary | ICD-10-CM

## 2021-08-05 RX ORDER — FUROSEMIDE 20 MG/1
TABLET ORAL
Qty: 90 TABLET | Refills: 0 | Status: SHIPPED | OUTPATIENT
Start: 2021-08-05 | End: 2021-11-05

## 2021-09-21 ENCOUNTER — HOSPITAL ENCOUNTER (OUTPATIENT)
Dept: CT IMAGING | Age: 81
Discharge: HOME | End: 2021-09-21
Attending: INTERNAL MEDICINE
Payer: MEDICARE

## 2021-09-21 DIAGNOSIS — R91.1 PULMONARY NODULE: ICD-10-CM

## 2021-09-21 PROCEDURE — 71250 CT THORAX DX C-: CPT

## 2021-09-23 ENCOUNTER — OFFICE VISIT (OUTPATIENT)
Dept: NEUROLOGY | Age: 81
End: 2021-09-23

## 2021-09-23 DIAGNOSIS — G62.9 POLYNEUROPATHY: Primary | ICD-10-CM

## 2021-09-23 DIAGNOSIS — F41.1 GAD (GENERALIZED ANXIETY DISORDER): ICD-10-CM

## 2021-09-23 DIAGNOSIS — F33.0 MILD EPISODE OF RECURRENT MAJOR DEPRESSIVE DISORDER (HCC): ICD-10-CM

## 2021-09-23 DIAGNOSIS — G43.909 MIGRAINE WITHOUT STATUS MIGRAINOSUS, NOT INTRACTABLE, UNSPECIFIED MIGRAINE TYPE: ICD-10-CM

## 2021-09-23 DIAGNOSIS — G45.9 TIA (TRANSIENT ISCHEMIC ATTACK): ICD-10-CM

## 2021-09-23 PROCEDURE — 99443 PR PHYS/QHP TELEPHONE EVALUATION 21-30 MIN: CPT | Performed by: PSYCHIATRY & NEUROLOGY

## 2021-09-23 RX ORDER — ESCITALOPRAM OXALATE 10 MG/1
10 TABLET ORAL DAILY
Qty: 90 TABLET | Refills: 4 | Status: SHIPPED | OUTPATIENT
Start: 2021-09-23

## 2021-09-23 NOTE — PROGRESS NOTES
Neurology Progress Note    NAME:  Hanna Pandey   :   1940   MRN:   [id number]     Date/Time:  2021  Subjective: Hanna Pandey is a 80 y.o. female here today for follow-up for pain, numbness or tingling sensation leg pain and back pain, anxiety, neuropathy, improved. This is a telephone visit  Patient says the ear pain has improved, she does not feel the pain anymore, currently she has discontinued his Tegretol. She noted that Lexapro has helped a whole lot with her depression and some of her anxiety. She says they found a spot in her lung, which is being followed by the pulmonologist.  CT scan of the chest showed spiculated left apical lung mass. PET/CT recommended. Left breast mass. Correlation with mammography recommended. We will follow  with the pulmonologist, her pulmonologist has recommended PET scan for further evaluation. Patient otherwise is doing fairly well  The sharp pain in the head has decreased a lot. She says  once in a while she experiences a sharp head pain with dizziness but has not gotten any worse since. She says she continues to monitor her blood pressure which has been doing fairly well since the last visit. She says she occasionally experiences back pain, back pain is intermittent, sharp in nature, burning sensation that goes down to the legs causing weakness, numbness and tingling sensation of the legs. She however denies any fall, dysphagia or odynophagia. At this time, I am not making any change.   Review of Systems - General ROS: positive for  - fatigue and sleep disturbance  Psychological ROS: positive for - anxiety, depression and sleep disturbances  Ophthalmic ROS: positive for - blurry vision and uses glasses  ENT ROS: positive for - headaches, tinnitus, vertigo and visual changes  Allergy and Immunology ROS: negative  Hematological and Lymphatic ROS: negative  Endocrine ROS: negative  Respiratory ROS: no cough, shortness of breath, or wheezing  Cardiovascular ROS: no chest pain or dyspnea on exertion  Gastrointestinal ROS: no abdominal pain, change in bowel habits, or black or bloody stools  Genito-Urinary ROS: no dysuria, trouble voiding, or hematuria  Musculoskeletal ROS: positive for - gait disturbance, joint pain, joint stiffness, joint swelling, muscle pain, muscular weakness and pain in back - bilateral and leg - bilateral  Neurological ROS: positive for - dizziness, gait disturbance, headaches, numbness/tingling and weakness  Dermatological ROS: negative       Medications reviewed:  Current Outpatient Medications   Medication Sig Dispense Refill    furosemide (LASIX) 20 mg tablet TAKE 1 TABLET BY MOUTH EVERY DAY 90 Tablet 0    diazePAM (VALIUM) 10 mg tablet TAKE 1 TABLET BY MOUTH EVERY 12 HOURS 60 Tablet 2    potassium chloride SR (KLOR-CON 10) 10 mEq tablet TAKE 1 TABLET BY MOUTH EVERY DAY 90 Tab 0    escitalopram oxalate (LEXAPRO) 10 mg tablet Take 1 Tab by mouth daily. 90 Tab 2    verapamil ER (VERELAN) 120 mg ER capsule TAKE 1 CAPSULE BY MOUTH EVERY DAY 90 Cap 3    tiotropium-olodateroL (STIOLTO RESPIMAT) 2.5-2.5 mcg/actuation inhaler Stiolto Respimat 2.5 mcg-2.5 mcg/actuation solution for inhalation      folic acid (FOLVITE) 1 mg tablet Take 1 Tab by mouth daily. 30 Tab 3    OTHER Lumbar brace  Lumbar stenosis 1 Units 0    levothyroxine (SYNTHROID) 75 mcg tablet Take  by mouth daily. 0    aspirin delayed-release 81 mg tablet Take  by mouth daily.  hydroxychloroquine (PLAQUENIL) 200 mg tablet Take 200 mg by mouth daily.  methotrexate (RHEUMATREX) 2.5 mg tablet Take  by mouth every Sunday. 6 capsules      Oxygen nightly. 2 liters      carBAMazepine XR (TEGretol XR) 100 mg SR tablet TAKE 1 TABLET BY MOUTH TWICE A DAY (Patient not taking: Reported on 9/23/2021) 60 Tablet 1    ascorbic acid (VITAMIN C PO) Take  by mouth as needed.  (Patient not taking: Reported on 9/23/2021)      predniSONE (DELTASONE) 10 mg tablet TAKE 1 TABLET BY MOUTH TWICE A DAY (Patient not taking: Reported on 9/23/2021) 30 Tab 0    chlorthalidone (HYGROTEN) 25 mg tablet  (Patient not taking: Reported on 9/23/2021)      clobetasol (TEMOVATE) 0.05 % topical cream apply to affected area twice a day externally (Patient not taking: Reported on 9/23/2021)  0    levomefolate-B6-meB12-algal oil (METANX, ALGAL OIL,) 3 mg-35 mg-2 mg -90.314 mg cap Take 1 Cap by mouth daily. (Patient not taking: Reported on 9/23/2021) 30 Cap 3    lisinopril-hydroCHLOROthiazide (PRINZIDE, ZESTORETIC) 10-12.5 mg per tablet take 1 tablet by mouth once daily (Patient not taking: Reported on 9/23/2021)  0    diclofenac (VOLTAREN) 1 % gel  (Patient not taking: Reported on 9/23/2021)  0    scopolamine (TRANSDERM-SCOP) 1 mg over 3 days pt3d 1 Patch by TransDERmal route every seventy-two (72) hours. (Patient not taking: Reported on 9/23/2021) 10 Patch 2    pregabalin (LYRICA) 75 mg capsule Take 1 Cap by mouth two (2) times a day. Max Daily Amount: 150 mg. (Patient not taking: Reported on 9/23/2021) 60 Cap 1    nortriptyline (PAMELOR) 10 mg capsule Take 1 Cap by mouth nightly. (Patient not taking: Reported on 9/23/2021) 30 Cap 1    HYDROcodone-acetaminophen (NORCO) 7.5-325 mg per tablet Take 1 Tab by mouth every eight (8) hours as needed for Pain. Max Daily Amount: 3 Tabs. (Patient not taking: Reported on 9/23/2021) 15 Tab 0    valACYclovir (VALTREX) 1 gram tablet Take 1 Tab by mouth daily. (Patient not taking: Reported on 9/23/2021) 30 Tab 1    meclizine (ANTIVERT) 25 mg tablet Take 1 Tab by mouth nightly. (Patient not taking: Reported on 9/23/2021) 60 Tab 3    cholecalciferol, vitamin D3, (VITAMIN D3) 2,000 unit tab Take  by mouth daily. (Patient not taking: Reported on 9/23/2021)      TIOTROPIUM BROMIDE (SPIRIVA WITH HANDIHALER IN) Take 2 Puffs by inhalation daily. (Patient not taking: Reported on 9/23/2021)          Objective:   Vitals:   There were no vitals filed for this visit. Lab Data Reviewed:  Lab Results   Component Value Date/Time    WBC 9.0 02/13/2019 11:52 AM    HCT 40.8 02/13/2019 11:52 AM    HGB 13.1 02/13/2019 11:52 AM    PLATELET 954 94/71/4251 11:52 AM       No results found for: NA, K, CL, CO2, GLU, BUN, CREA, CA    No components found for: TROPQUANT    No results found for: PAMELA      No results found for: HBA1C, KLV6YNPJ, HMZ6ZJWY     Lab Results   Component Value Date/Time    Vitamin B12 426 09/11/2018 10:38 AM       No results found for: PAMELA, ANARX, ANAIGG, XBANA    No results found for: CHOL, CHOLPOCT, CHOLX, CHLST, CHOLV, HDL, HDLPOC, HDLP, LDL, LDLCPOC, LDLC, DLDLP, VLDLC, VLDL, TGLX, TRIGL, TRIGP, TGLPOCT, CHHD, CHHDX      CT Results (recent):  Results from Hospital Encounter encounter on 09/21/21    CT CHEST WO CONT    Narrative  INDICATION: Pulmonary nodule    COMPARISON: June 23, 2021    CONTRAST: None. TECHNIQUE:  5 mm axial images were obtained through the thorax. Coronal and  sagittal reformats were generated. CT dose reduction was achieved through use  of a standardized protocol tailored for this examination and automatic exposure  control for dose modulation. The absence of intravenous contrast reduces the sensitivity for evaluation of  the mediastinum, elen, vasculature, and upper abdominal organs. FINDINGS:    CHEST WALL: 15 mm left breast mass. THYROID: No nodule. MEDIASTINUM: No mass or lymphadenopathy. ELEN: No mass or lymphadenopathy. THORACIC AORTA: No aneurysm. MAIN PULMONARY ARTERY: Normal in caliber. TRACHEA/BRONCHI: Patent. ESOPHAGUS: No wall thickening or dilatation. HEART: Normal in size. PLEURA: No effusion or pneumothorax. LUNGS: Spiculated 2 cm left apical lung mass persists. INCIDENTALLY IMAGED UPPER ABDOMEN: No significant abnormality in the  incidentally imaged upper abdomen. BONES: No destructive bone lesion. Impression  1. Spiculated left apical lung mass. PET/CT recommended. 2. Left breast mass. Correlation with mammography recommended. 23X      MRI Results (recent):  [facility]    MRI BRAIN W WO CONT    Narrative  Clinical history: Dizziness and giddiness  INDICATION:   Dizziness and giddiness    COMPARISON: 5/16/2018, 4/19/2017  TECHNIQUE: MR examination of the brain includes axial and sagittal T1 , axial  T2, axial FLAIR, axial gradient echo, axial DWI, coronal T1 . Pre and post  contrast axial T1-weighted imaging. Postcontrast T1-weighted imaging coronal  plane. CONTRAST: The patient was administered gadolinium-based contrast material,  subsequently axial and sagittal T1-weighted postcontrast imaging was obtained. FINDINGS:  There is no intracranial mass, hemorrhage or acute infarction. Confluent periventricular and scattered foci of increased T2 signal intensity  corona radiata consistent  Not significantly changed. Trace fluid in the inferior mastoid air cell on the  left. IACs are symmetric in size. The left vertebral artery slightly larger than  the right. Mild mucoperiosteal thickening in the right and left maxillary  sinuses. . There is no abnormal parenchymal enhancement. There is no abnormal  meningeal enhancement demonstrated. The brain architecture is normal. There is  no evidence of midline shift or mass-effect. The ventricles are normal in size,  position and configuration. There are no extra-axial fluid collections. Major  intracranial vascular flow-voids are unremarkable. The orbits are grossly  symmetric. Dural venous sinuses are grossly unremarkable. There is no Chiari or  syrinx. Pituitary and infundibulum grossly unremarkable. Cerebellopontine angles  are unremarkable. No skull base mass is identified. Cavernous sinuses are  symmetric. The mastoid air cells and are well pneumatized and clear. Impression  IMPRESSION:  Mild/moderate chronic microvascular ischemic change. Trace paranasal sinus disease.   No intracranial mass, hemorrhage or evidence of acute infarction. IR Results (recent):  No results found for this or any previous visit. VAS/US Results (recent):  No results found for this or any previous visit. PHYSICAL EXAM:  Deferred      NEUROLOGICAL EXAM:  Deferred  Assesment  1. Polyneuropathy  Continue Neurontin    2. POORNIMA (generalized anxiety disorder)  Continue Valium and Lexapro    3. Migraine without status migrainosus, not intractable, unspecified migraine type  Continue management    4. TIA (transient ischemic attack)  Aspirin 81 mg p.o. daily    5. Mild episode of recurrent major depressive disorder (HCC)  Lexapro  ___________________________________________________  PLAN: Medication and plan discussed with patient      ICD-10-CM ICD-9-CM    1. Polyneuropathy  G62.9 356.9    2. POORNIMA (generalized anxiety disorder)  F41.1 300.02    3. Migraine without status migrainosus, not intractable, unspecified migraine type  G43.909 346.90    4. TIA (transient ischemic attack)  G45.9 435.9    5. Mild episode of recurrent major depressive disorder (HCC)  F33.0 296.31      Follow-up and Dispositions    · Return in about 4 months (around 1/23/2022) for [facility]-morning appointment. I was in the office while conducting this encounter. Consent:  She and/or her healthcare decision maker is aware that this patient-initiated Telehealth encounter is a billable service, with coverage as determined by her insurance carrier. She is aware that she may receive a bill and has provided verbal consent to proceed: Yes        Total Time: minutes: 21-30 minutes.   More than half of the time was spent on counseling and instructions        ___________________________________________________    Attending Physician: Pedro Severino MD

## 2021-10-04 DIAGNOSIS — G43.909 MIGRAINE WITHOUT STATUS MIGRAINOSUS, NOT INTRACTABLE, UNSPECIFIED MIGRAINE TYPE: ICD-10-CM

## 2021-10-05 RX ORDER — VERAPAMIL HYDROCHLORIDE 120 MG/1
CAPSULE, EXTENDED RELEASE ORAL
Qty: 90 CAPSULE | Refills: 3 | Status: SHIPPED | OUTPATIENT
Start: 2021-10-05 | End: 2022-08-25

## 2021-11-05 RX ORDER — FUROSEMIDE 20 MG/1
TABLET ORAL
Qty: 90 TABLET | Refills: 0 | Status: SHIPPED | OUTPATIENT
Start: 2021-11-05 | End: 2022-04-11

## 2022-01-11 ENCOUNTER — TRANSCRIBE ORDER (OUTPATIENT)
Dept: SCHEDULING | Age: 82
End: 2022-01-11

## 2022-01-11 DIAGNOSIS — C34.12 MALIGNANT NEOPLASM OF LINGULA OF LEFT LUNG (HCC): Primary | ICD-10-CM

## 2022-01-24 ENCOUNTER — OFFICE VISIT (OUTPATIENT)
Dept: NEUROLOGY | Age: 82
End: 2022-01-24
Payer: MEDICARE

## 2022-01-24 VITALS
HEIGHT: 61 IN | SYSTOLIC BLOOD PRESSURE: 127 MMHG | BODY MASS INDEX: 40.52 KG/M2 | HEART RATE: 73 BPM | OXYGEN SATURATION: 96 % | DIASTOLIC BLOOD PRESSURE: 69 MMHG | WEIGHT: 214.6 LBS | TEMPERATURE: 98 F

## 2022-01-24 DIAGNOSIS — G62.9 POLYNEUROPATHY: ICD-10-CM

## 2022-01-24 DIAGNOSIS — R42 DIZZINESS: ICD-10-CM

## 2022-01-24 DIAGNOSIS — F41.9 ANXIETY: ICD-10-CM

## 2022-01-24 DIAGNOSIS — F41.1 GAD (GENERALIZED ANXIETY DISORDER): ICD-10-CM

## 2022-01-24 DIAGNOSIS — G43.909 MIGRAINE WITHOUT STATUS MIGRAINOSUS, NOT INTRACTABLE, UNSPECIFIED MIGRAINE TYPE: Primary | ICD-10-CM

## 2022-01-24 DIAGNOSIS — M35.9 AUTOIMMUNE DISEASE (HCC): ICD-10-CM

## 2022-01-24 DIAGNOSIS — M54.50 ACUTE BILATERAL LOW BACK PAIN WITHOUT SCIATICA: ICD-10-CM

## 2022-01-24 DIAGNOSIS — M54.16 LUMBAR RADICULOPATHY: ICD-10-CM

## 2022-01-24 DIAGNOSIS — R20.2 PARESTHESIA: ICD-10-CM

## 2022-01-24 PROCEDURE — 1090F PRES/ABSN URINE INCON ASSESS: CPT | Performed by: PSYCHIATRY & NEUROLOGY

## 2022-01-24 PROCEDURE — G8400 PT W/DXA NO RESULTS DOC: HCPCS | Performed by: PSYCHIATRY & NEUROLOGY

## 2022-01-24 PROCEDURE — G8417 CALC BMI ABV UP PARAM F/U: HCPCS | Performed by: PSYCHIATRY & NEUROLOGY

## 2022-01-24 PROCEDURE — G8432 DEP SCR NOT DOC, RNG: HCPCS | Performed by: PSYCHIATRY & NEUROLOGY

## 2022-01-24 PROCEDURE — 1101F PT FALLS ASSESS-DOCD LE1/YR: CPT | Performed by: PSYCHIATRY & NEUROLOGY

## 2022-01-24 PROCEDURE — 99214 OFFICE O/P EST MOD 30 MIN: CPT | Performed by: PSYCHIATRY & NEUROLOGY

## 2022-01-24 PROCEDURE — G8536 NO DOC ELDER MAL SCRN: HCPCS | Performed by: PSYCHIATRY & NEUROLOGY

## 2022-01-24 PROCEDURE — G8427 DOCREV CUR MEDS BY ELIG CLIN: HCPCS | Performed by: PSYCHIATRY & NEUROLOGY

## 2022-01-24 RX ORDER — DIAZEPAM 10 MG/1
10 TABLET ORAL EVERY 12 HOURS
Qty: 180 TABLET | Refills: 2 | Status: SHIPPED | OUTPATIENT
Start: 2022-01-24 | End: 2022-08-24

## 2022-01-24 RX ORDER — ALBUTEROL SULFATE 0.83 MG/ML
SOLUTION RESPIRATORY (INHALATION)
COMMUNITY

## 2022-01-24 NOTE — PROGRESS NOTES
Neurology Progress Note    NAME:  Abad Yuan   :   1940   MRN:   [id number]     Date/Time:  2022  Subjective: Abad Yuan is a 80 y.o. female here today for follow-up for pain, numbness or tingling sensation leg pain and back pain, anxiety, neuropathy. Patient was accompanied by  her sister to the visit. Patient says that she has been taking for the breast mass, lung mass is being monitored  She says her ear and facial pain has improved  Patient noted that she had a fall and twisted her ankle, apparently she tripped. She did not lose consciousness  Patient says she is still doing  fairly well, however has weakness of the lower extremity . She says the medication has helped her to adjust, her anxiety has decreased  I will refer patient to physical therapy  The sharp pain in the head has decreased a lot. She says  once in a while she experiences a sharp head pain with dizziness but has not gotten any worse since. She says she occasionally experiences back pain, back pain is intermittent, sharp in nature, burning sensation that goes down to the legs causing weakness, numbness and tingling sensation of the legs. She says her headache is improved greatly     She however denies any fall, dysphagia or odynophagia.   Review of Systems - General ROS: positive for  - fatigue and sleep disturbance  Psychological ROS: positive for - anxiety, depression and sleep disturbances  Ophthalmic ROS: positive for - blurry vision and uses glasses  ENT ROS: positive for - headaches, tinnitus, vertigo and visual changes  Allergy and Immunology ROS: negative  Hematological and Lymphatic ROS: negative  Endocrine ROS: negative  Respiratory ROS: no cough, shortness of breath, or wheezing  Cardiovascular ROS: no chest pain or dyspnea on exertion  Gastrointestinal ROS: no abdominal pain, change in bowel habits, or black or bloody stools  Genito-Urinary ROS: no dysuria, trouble voiding, or hematuria  Musculoskeletal ROS: positive for - gait disturbance, joint pain, joint stiffness, joint swelling, muscle pain, muscular weakness and pain in back - bilateral and leg - bilateral  Neurological ROS: positive for - dizziness, gait disturbance, headaches, numbness/tingling and weakness  Dermatological ROS: negative          Medications reviewed:  Current Outpatient Medications   Medication Sig Dispense Refill    diazePAM (VALIUM) 10 mg tablet Take 1 Tablet by mouth every twelve (12) hours. 180 Tablet 2    furosemide (LASIX) 20 mg tablet TAKE 1 TABLET BY MOUTH EVERY DAY 90 Tablet 0    verapamil ER (VERELAN) 120 mg ER capsule TAKE 1 CAPSULE BY MOUTH EVERY DAY 90 Capsule 3    escitalopram oxalate (LEXAPRO) 10 mg tablet Take 1 Tablet by mouth daily. 90 Tablet 4    potassium chloride SR (KLOR-CON 10) 10 mEq tablet TAKE 1 TABLET BY MOUTH EVERY DAY 90 Tab 0    tiotropium-olodateroL (STIOLTO RESPIMAT) 2.5-2.5 mcg/actuation inhaler Stiolto Respimat 2.5 mcg-2.5 mcg/actuation solution for inhalation      folic acid (FOLVITE) 1 mg tablet Take 1 Tab by mouth daily. 30 Tab 3    OTHER Lumbar brace  Lumbar stenosis 1 Units 0    scopolamine (TRANSDERM-SCOP) 1 mg over 3 days pt3d 1 Patch by TransDERmal route every seventy-two (72) hours. 10 Patch 2    pregabalin (LYRICA) 75 mg capsule Take 1 Cap by mouth two (2) times a day. Max Daily Amount: 150 mg. 60 Cap 1    levothyroxine (SYNTHROID) 75 mcg tablet Take  by mouth daily. 0    aspirin delayed-release 81 mg tablet Take  by mouth daily.  hydroxychloroquine (PLAQUENIL) 200 mg tablet Take 200 mg by mouth daily.  methotrexate (RHEUMATREX) 2.5 mg tablet Take  by mouth every Sunday. 6 capsules      Oxygen nightly.  2 liters      albuterol (PROVENTIL VENTOLIN) 2.5 mg /3 mL (0.083 %) nebu albuterol sulfate 2.5 mg/3 mL (0.083 %) solution for nebulization      carBAMazepine XR (TEGretol XR) 100 mg SR tablet TAKE 1 TABLET BY MOUTH TWICE A DAY (Patient not taking: Reported on 1/24/2022) 60 Tablet 1    ascorbic acid (VITAMIN C PO) Take  by mouth as needed. (Patient not taking: Reported on 9/23/2021)      predniSONE (DELTASONE) 10 mg tablet TAKE 1 TABLET BY MOUTH TWICE A DAY (Patient not taking: Reported on 9/23/2021) 30 Tab 0    chlorthalidone (HYGROTEN) 25 mg tablet  (Patient not taking: Reported on 9/23/2021)      clobetasol (TEMOVATE) 0.05 % topical cream apply to affected area twice a day externally (Patient not taking: Reported on 9/23/2021)  0    levomefolate-B6-meB12-algal oil (METANX, ALGAL OIL,) 3 mg-35 mg-2 mg -90.314 mg cap Take 1 Cap by mouth daily. (Patient not taking: Reported on 9/23/2021) 30 Cap 3    lisinopril-hydroCHLOROthiazide (PRINZIDE, ZESTORETIC) 10-12.5 mg per tablet take 1 tablet by mouth once daily (Patient not taking: Reported on 9/23/2021)  0    diclofenac (VOLTAREN) 1 % gel  (Patient not taking: Reported on 9/23/2021)  0    nortriptyline (PAMELOR) 10 mg capsule Take 1 Cap by mouth nightly. (Patient not taking: Reported on 9/23/2021) 30 Cap 1    HYDROcodone-acetaminophen (NORCO) 7.5-325 mg per tablet Take 1 Tab by mouth every eight (8) hours as needed for Pain. Max Daily Amount: 3 Tabs. (Patient not taking: Reported on 9/23/2021) 15 Tab 0    valACYclovir (VALTREX) 1 gram tablet Take 1 Tab by mouth daily. (Patient not taking: Reported on 9/23/2021) 30 Tab 1    meclizine (ANTIVERT) 25 mg tablet Take 1 Tab by mouth nightly. (Patient not taking: Reported on 9/23/2021) 60 Tab 3    cholecalciferol, vitamin D3, (VITAMIN D3) 2,000 unit tab Take  by mouth daily. (Patient not taking: Reported on 9/23/2021)      TIOTROPIUM BROMIDE (SPIRIVA WITH HANDIHALER IN) Take 2 Puffs by inhalation daily.  (Patient not taking: Reported on 9/23/2021)          Objective:   Vitals:  Vitals:    01/24/22 1030   BP: 127/69   Pulse: 73   Temp: 98 °F (36.7 °C)   TempSrc: Temporal   SpO2: 96%   Weight: 214 lb 9.6 oz (97.3 kg)   Height: 5' 1\" (1.549 m)   PainSc:   0 - No pain               Lab Data Reviewed:  Lab Results   Component Value Date/Time    WBC 9.0 02/13/2019 11:52 AM    HCT 40.8 02/13/2019 11:52 AM    HGB 13.1 02/13/2019 11:52 AM    PLATELET 514 28/20/3065 11:52 AM       No results found for: NA, K, CL, CO2, GLU, BUN, CREA, CA    No components found for: TROPQUANT    No results found for: PAMELA      No results found for: HBA1C, [id number], NZX2JQCL     Lab Results   Component Value Date/Time    Vitamin B12 426 09/11/2018 10:38 AM       No results found for: PAMELA, ANARX, ANAIGG, XBANA    No results found for: CHOL, CHOLPOCT, CHOLX, CHLST, CHOLV, HDL, HDLPOC, HDLP, LDL, LDLCPOC, LDLC, DLDLP, VLDLC, VLDL, TGLX, TRIGL, TRIGP, TGLPOCT, CHHD, CHHDX      CT Results (recent):  Results from Hospital Encounter encounter on 09/21/21    CT CHEST WO CONT    Narrative  INDICATION: Pulmonary nodule    COMPARISON: June 23, 2021    CONTRAST: None. TECHNIQUE:  5 mm axial images were obtained through the thorax. Coronal and  sagittal reformats were generated. CT dose reduction was achieved through use  of a standardized protocol tailored for this examination and automatic exposure  control for dose modulation. The absence of intravenous contrast reduces the sensitivity for evaluation of  the mediastinum, elen, vasculature, and upper abdominal organs. FINDINGS:    CHEST WALL: 15 mm left breast mass. THYROID: No nodule. MEDIASTINUM: No mass or lymphadenopathy. ELEN: No mass or lymphadenopathy. THORACIC AORTA: No aneurysm. MAIN PULMONARY ARTERY: Normal in caliber. TRACHEA/BRONCHI: Patent. ESOPHAGUS: No wall thickening or dilatation. HEART: Normal in size. PLEURA: No effusion or pneumothorax. LUNGS: Spiculated 2 cm left apical lung mass persists. INCIDENTALLY IMAGED UPPER ABDOMEN: No significant abnormality in the  incidentally imaged upper abdomen. BONES: No destructive bone lesion. Impression  1. Spiculated left apical lung mass. PET/CT recommended. 2. Left breast mass.  Correlation with mammography recommended. 23X      MRI Results (recent):  Results from [place] encounter on 07/20/20    MRI BRAIN W WO CONT    Narrative  Clinical history: Dizziness and giddiness  INDICATION:   Dizziness and giddiness    COMPARISON: 5/16/2018, 4/19/2017  TECHNIQUE: MR examination of the brain includes axial and sagittal T1 , axial  T2, axial FLAIR, axial gradient echo, axial DWI, coronal T1 . Pre and post  contrast axial T1-weighted imaging. Postcontrast T1-weighted imaging coronal  plane. CONTRAST: The patient was administered gadolinium-based contrast material,  subsequently axial and sagittal T1-weighted postcontrast imaging was obtained. FINDINGS:  There is no intracranial mass, hemorrhage or acute infarction. Confluent periventricular and scattered foci of increased T2 signal intensity  corona radiata consistent  Not significantly changed. Trace fluid in the inferior mastoid air cell on the  left. IACs are symmetric in size. The left vertebral artery slightly larger than  the right. Mild mucoperiosteal thickening in the right and left maxillary  sinuses. . There is no abnormal parenchymal enhancement. There is no abnormal  meningeal enhancement demonstrated. The brain architecture is normal. There is  no evidence of midline shift or mass-effect. The ventricles are normal in size,  position and configuration. There are no extra-axial fluid collections. Major  intracranial vascular flow-voids are unremarkable. The orbits are grossly  symmetric. Dural venous sinuses are grossly unremarkable. There is no Chiari or  syrinx. Pituitary and infundibulum grossly unremarkable. Cerebellopontine angles  are unremarkable. No skull base mass is identified. Cavernous sinuses are  symmetric. The mastoid air cells and are well pneumatized and clear. Impression  IMPRESSION:  Mild/moderate chronic microvascular ischemic change. Trace paranasal sinus disease.   No intracranial mass, hemorrhage or evidence of acute infarction. IR Results (recent):  No results found for this or any previous visit. VAS/US Results (recent):  No results found for this or any previous visit. PHYSICAL EXAM:  General:    Alert, cooperative, no distress, appears stated age. Head:   Normocephalic, without obvious abnormality, atraumatic. Eyes:   Conjunctivae/corneas clear. PERRLA  Nose:  Nares normal. No drainage or sinus tenderness. Throat:    Lips, mucosa, and tongue normal.  No Thrush  Neck:  Supple, symmetrical,  no adenopathy, thyroid: non tender    no carotid bruit and no JVD. Back:    Symmetric, bilateral tenderness. Lungs:   Clear to auscultation bilaterally. No Wheezing or Rhonchi. No rales. Chest wall:  No tenderness or deformity. No Accessory muscle use. Heart:   Regular rate and rhythm,  no murmur, rub or gallop. Abdomen:   Soft, non-tender. Not distended. Bowel sounds normal. No masses  Extremities: Extremities normal, atraumatic, No cyanosis. No edema. No clubbing  Skin:     Texture, turgor normal. No rashes or lesions. Not Jaundiced  Lymph nodes: Cervical, supraclavicular normal.  Psych:  Good insight. Not depressed. Anxious. NEUROLOGICAL EXAM:  Appearance: The patient is well developed, well nourished, provides a coherent history and is in no acute distress. Mental Status: Oriented to time, place and person. Mood and affect appropriate. Cranial Nerves:   Intact visual fields. Fundi are benign. GITA, EOM's full, no nystagmus, no ptosis. Facial sensation is normal. Corneal reflexes are intact. Facial movement is symmetric. Hearing is normal bilaterally. Palate is midline with normal sternocleidomastoid and trapezius muscles are normal. Tongue is midline. Motor:  5/5 strength in upper extremity and 5-/5 lower extremity proximal and distal muscles. Normal bulk and tone. No fasciculations.    Reflexes:   Deep tendon reflexes 2+/4 and symmetrical.   Sensory:    Decreased sensation  to touch, pinprick and vibration bilateral lower extremity up to the knee upper extremity in glove distribution. Gait:  Normal gait. Tremor:    Mild tremor noted. Cerebellar:  No cerebellar signs present. Neurovascular:  Normal heart sounds and regular rhythm, peripheral pulses intact, and no carotid bruits. Assesment  1. Migraine without status migrainosus, not intractable, unspecified migraine type  stable    2. Polyneuropathy  Neurontin    3. POORNIMA (generalized anxiety disorder)  diazepam  4. Paresthesia  stable    5. Autoimmune disease ([facility]  following rheumatologist    6. Anxiety    - diazePAM (VALIUM) 10 mg tablet; Take 1 Tablet by mouth every twelve (12) hours. Dispense: 180 Tablet; Refill: 2    7. Dizziness    - diazePAM (VALIUM) 10 mg tablet; Take 1 Tablet by mouth every twelve (12) hours. Dispense: 180 Tablet; Refill: 2    ___________________________________________________  PLAN:      ICD-10-CM ICD-9-CM    1. Migraine without status migrainosus, not intractable, unspecified migraine type  G43.909 346.90    2. Polyneuropathy  G62.9 356.9    3. POORNIMA (generalized anxiety disorder)  F41.1 300.02    4. Paresthesia  R20.2 782.0    5. Autoimmune disease ([facility]  M35.9 279.49    6. Anxiety  F41.9 300.00 diazePAM (VALIUM) 10 mg tablet   7. Dizziness  R42 780.4 diazePAM (VALIUM) 10 mg tablet     Follow-up and Dispositions    · Return in about 3 months (around 4/24/2022).            ___________________________________________________    Attending Physician: Jere Potter MD               Had surgery for biopsy in nov

## 2022-03-16 RX ORDER — POTASSIUM CHLORIDE 750 MG/1
TABLET, FILM COATED, EXTENDED RELEASE ORAL
Qty: 90 TABLET | Refills: 3 | Status: SHIPPED | OUTPATIENT
Start: 2022-03-16

## 2022-03-19 PROBLEM — E66.01 OBESITY, MORBID (HCC): Status: ACTIVE | Noted: 2018-05-14

## 2022-03-29 ENCOUNTER — HOSPITAL ENCOUNTER (OUTPATIENT)
Dept: CT IMAGING | Age: 82
Discharge: HOME | End: 2022-03-29
Attending: INTERNAL MEDICINE
Payer: MEDICARE

## 2022-03-29 DIAGNOSIS — C34.12 MALIGNANT NEOPLASM OF LINGULA OF LEFT LUNG (HCC): ICD-10-CM

## 2022-03-29 PROCEDURE — 71250 CT THORAX DX C-: CPT

## 2022-04-11 RX ORDER — FUROSEMIDE 20 MG/1
TABLET ORAL
Qty: 90 TABLET | Refills: 0 | Status: SHIPPED | OUTPATIENT
Start: 2022-04-11 | End: 2022-07-13

## 2022-04-27 ENCOUNTER — OFFICE VISIT (OUTPATIENT)
Dept: NEUROLOGY | Age: 82
End: 2022-04-27
Payer: MEDICARE

## 2022-04-27 DIAGNOSIS — G52.1 NEURALGIA OF LEFT GLOSSOPHARYNGEAL NERVE: ICD-10-CM

## 2022-04-27 DIAGNOSIS — F41.1 GAD (GENERALIZED ANXIETY DISORDER): ICD-10-CM

## 2022-04-27 DIAGNOSIS — G89.29 CHRONIC BILATERAL LOW BACK PAIN WITHOUT SCIATICA: Primary | ICD-10-CM

## 2022-04-27 DIAGNOSIS — M54.16 LUMBAR RADICULOPATHY: ICD-10-CM

## 2022-04-27 DIAGNOSIS — G43.909 MIGRAINE WITHOUT STATUS MIGRAINOSUS, NOT INTRACTABLE, UNSPECIFIED MIGRAINE TYPE: ICD-10-CM

## 2022-04-27 DIAGNOSIS — R29.6 FREQUENT FALLS: ICD-10-CM

## 2022-04-27 DIAGNOSIS — R42 DIZZINESS: ICD-10-CM

## 2022-04-27 DIAGNOSIS — R26.9 GAIT DISORDER: ICD-10-CM

## 2022-04-27 DIAGNOSIS — M54.50 CHRONIC BILATERAL LOW BACK PAIN WITHOUT SCIATICA: Primary | ICD-10-CM

## 2022-04-27 DIAGNOSIS — G62.9 POLYNEUROPATHY: ICD-10-CM

## 2022-04-27 PROCEDURE — 99443 PR PHYS/QHP TELEPHONE EVALUATION 21-30 MIN: CPT | Performed by: PSYCHIATRY & NEUROLOGY

## 2022-04-27 NOTE — PROGRESS NOTES
Neurology Progress Note    NAME:  Richelle Watson   :   1940   MRN:   [id number]     Date/Time:  2022  Subjective: Richelle Watson is a 80 y.o. female here today for follow-up for pain, numbness or tingling sensation leg pain and back pain, anxiety, neuropathy. This is a phone visit  Patient noted that she has been doing fairly well, headache has improved. She says her ear and facial pain has also improved   Patient walking has been unsteady since after the fall  that twisted her ankle. She has had another fall since then. I will refer patient to physical therapy  She says she had a follow-up CT scan chest and was told that the lesion has not changed  She says  once in a while she experiences a sharp head pain with dizziness but has not gotten any worse since. She says she occasionally experiences back pain, back pain is intermittent, sharp in nature, burning sensation that goes down to the legs causing weakness, numbness and tingling sensation of the legs. She says her headache is improved greatly     She however denies any fall, dysphagia or odynophagia.   I will continue current medications  Review of Systems - General ROS: positive for  - fatigue and sleep disturbance  Psychological ROS: positive for - anxiety, depression and sleep disturbances  Ophthalmic ROS: positive for - blurry vision and uses glasses  ENT ROS: positive for - headaches, tinnitus, vertigo and visual changes  Allergy and Immunology ROS: negative  Hematological and Lymphatic ROS: negative  Endocrine ROS: negative  Respiratory ROS: no cough, shortness of breath, or wheezing  Cardiovascular ROS: no chest pain or dyspnea on exertion  Gastrointestinal ROS: no abdominal pain, change in bowel habits, or black or bloody stools  Genito-Urinary ROS: no dysuria, trouble voiding, or hematuria  Musculoskeletal ROS: positive for - gait disturbance, joint pain, joint stiffness, joint swelling, muscle pain, muscular weakness and pain in back - bilateral and leg - bilateral  Neurological ROS: positive for - dizziness, gait disturbance, headaches, numbness/tingling and weakness  Dermatological ROS: negative          Medications reviewed:  Current Outpatient Medications   Medication Sig Dispense Refill    furosemide (LASIX) 20 mg tablet TAKE 1 TABLET BY MOUTH EVERY DAY 90 Tablet 0    potassium chloride SR (KLOR-CON 10) 10 mEq tablet TAKE 1 TABLET BY MOUTH EVERY DAY 90 Tablet 3    albuterol (PROVENTIL VENTOLIN) 2.5 mg /3 mL (0.083 %) nebu albuterol sulfate 2.5 mg/3 mL (0.083 %) solution for nebulization      diazePAM (VALIUM) 10 mg tablet Take 1 Tablet by mouth every twelve (12) hours. 180 Tablet 2    verapamil ER (VERELAN) 120 mg ER capsule TAKE 1 CAPSULE BY MOUTH EVERY DAY 90 Capsule 3    escitalopram oxalate (LEXAPRO) 10 mg tablet Take 1 Tablet by mouth daily. 90 Tablet 4    tiotropium-olodateroL (STIOLTO RESPIMAT) 2.5-2.5 mcg/actuation inhaler Stiolto Respimat 2.5 mcg-2.5 mcg/actuation solution for inhalation      ascorbic acid (VITAMIN C PO) Take  by mouth as needed.  folic acid (FOLVITE) 1 mg tablet Take 1 Tab by mouth daily. 30 Tab 3    OTHER Lumbar brace  Lumbar stenosis 1 Units 0    levothyroxine (SYNTHROID) 75 mcg tablet Take  by mouth daily. 0    cholecalciferol, vitamin D3, (VITAMIN D3) 2,000 unit tab Take  by mouth daily.  aspirin delayed-release 81 mg tablet Take  by mouth daily.  hydroxychloroquine (PLAQUENIL) 200 mg tablet Take 200 mg by mouth daily.  methotrexate (RHEUMATREX) 2.5 mg tablet Take  by mouth every Sunday. 6 capsules      Oxygen nightly. 2 liters      clobetasol (TEMOVATE) 0.05 % topical cream apply to affected area twice a day externally (Patient not taking: Reported on 4/27/2022)  0    levomefolate-B6-meB12-algal oil (METANX, ALGAL OIL,) 3 mg-35 mg-2 mg -90.314 mg cap Take 1 Cap by mouth daily.  (Patient not taking: Reported on 4/27/2022) 30 Cap 3    diclofenac (VOLTAREN) 1 % gel (Patient not taking: Reported on 4/27/2022)  0    scopolamine (TRANSDERM-SCOP) 1 mg over 3 days pt3d 1 Patch by TransDERmal route every seventy-two (72) hours. (Patient not taking: Reported on 4/27/2022) 10 Patch 2    pregabalin (LYRICA) 75 mg capsule Take 1 Cap by mouth two (2) times a day. Max Daily Amount: 150 mg. (Patient not taking: Reported on 4/27/2022) 60 Cap 1    nortriptyline (PAMELOR) 10 mg capsule Take 1 Cap by mouth nightly. (Patient not taking: Reported on 4/27/2022) 30 Cap 1        Objective:   Vitals: There were no vitals filed for this visit. Lab Data Reviewed:  Lab Results   Component Value Date/Time    WBC 9.0 02/13/2019 11:52 AM    HCT 40.8 02/13/2019 11:52 AM    HGB 13.1 02/13/2019 11:52 AM    PLATELET 506 62/11/1212 11:52 AM       No results found for: NA, K, CL, CO2, GLU, BUN, CREA, CA    No components found for: TROPQUANT    No results found for: PAMELA      No results found for: HBA1C, [id number], [id number]     Lab Results   Component Value Date/Time    Vitamin B12 426 09/11/2018 10:38 AM       No results found for: PAMELA, ANARX, ANAIGG, XBANA    No results found for: CHOL, CHOLPOCT, CHOLX, CHLST, CHOLV, HDL, HDLPOC, HDLP, LDL, LDLCPOC, LDLC, DLDLP, VLDLC, VLDL, TGLX, TRIGL, TRIGP, TGLPOCT, CHHD, CHHDX      CT Results (recent):  Results from [place] encounter on 03/29/22    CT CHEST WO CONT    Narrative  INDICATION: Lung carcinoma    COMPARISON: September 21, 2021    CONTRAST: None. TECHNIQUE:  5 mm axial images were obtained through the chest. Coronal and  sagittal reformats were generated. CT dose reduction was achieved through use  of a standardized protocol tailored for this examination and automatic exposure  control for dose modulation. The absence of intravenous contrast reduces the sensitivity for evaluation of  the mediastinum, venkata, vasculature, and upper abdominal organs. FINDINGS:    CHEST WALL: No mass or axillary lymphadenopathy.   THYROID: No nodule. MEDIASTINUM: No mass or lymphadenopathy. ELEN: No mass or lymphadenopathy. THORACIC AORTA: No aneurysm. MAIN PULMONARY ARTERY: Normal in caliber. TRACHEA/BRONCHI: Patent. ESOPHAGUS: No wall thickening or dilatation. HEART: Prominent size  PLEURA: No effusion or pneumothorax. LUNGS: Interval surgical removal of the left apical lesion, minimal postsurgical  scarring without residual or recurrent disease suspected at this time. Attention  to follow up. There is no new parenchymal lesions seen. INCIDENTALLY IMAGED UPPER ABDOMEN: No significant abnormality in the  incidentally imaged upper abdomen. BONES: No destructive bone lesion. Impression  Postsurgical left upper lobe without evidence at this time to suggest residual  or recurrent lesion. MRI Results (recent):  Results from [place] encounter on 07/20/20    MRI BRAIN W WO CONT    Narrative  Clinical history: Dizziness and giddiness  INDICATION:   Dizziness and giddiness    COMPARISON: 5/16/2018, 4/19/2017  TECHNIQUE: MR examination of the brain includes axial and sagittal T1 , axial  T2, axial FLAIR, axial gradient echo, axial DWI, coronal T1 . Pre and post  contrast axial T1-weighted imaging. Postcontrast T1-weighted imaging coronal  plane. CONTRAST: The patient was administered gadolinium-based contrast material,  subsequently axial and sagittal T1-weighted postcontrast imaging was obtained. FINDINGS:  There is no intracranial mass, hemorrhage or acute infarction. Confluent periventricular and scattered foci of increased T2 signal intensity  corona radiata consistent  Not significantly changed. Trace fluid in the inferior mastoid air cell on the  left. IACs are symmetric in size. The left vertebral artery slightly larger than  the right. Mild mucoperiosteal thickening in the right and left maxillary  sinuses. . There is no abnormal parenchymal enhancement. There is no abnormal  meningeal enhancement demonstrated.  The brain architecture is normal. There is  no evidence of midline shift or mass-effect. The ventricles are normal in size,  position and configuration. There are no extra-axial fluid collections. Major  intracranial vascular flow-voids are unremarkable. The orbits are grossly  symmetric. Dural venous sinuses are grossly unremarkable. There is no Chiari or  syrinx. Pituitary and infundibulum grossly unremarkable. Cerebellopontine angles  are unremarkable. No skull base mass is identified. Cavernous sinuses are  symmetric. The mastoid air cells and are well pneumatized and clear. Impression  IMPRESSION:  Mild/moderate chronic microvascular ischemic change. Trace paranasal sinus disease. No intracranial mass, hemorrhage or evidence of acute infarction. IR Results (recent):  No results found for this or any previous visit. VAS/US Results (recent):  No results found for this or any previous visit. PHYSICAL EXAM:  Deferred    NEUROLOGICAL EXAM:  Deferred  Assesment  1. Polyneuropathy  Lyrica    2. Migraine without status migrainosus, not intractable, unspecified migraine type  Verapamil    3. POORNIMA (generalized anxiety disorder)    Lexapro    4. Lumbar radiculopathy    - REFERRAL TO PHYSICAL THERAPY    5. Frequent falls    - REFERRAL TO PHYSICAL THERAPY    6. Chronic bilateral low back pain without sciatica    - REFERRAL TO PHYSICAL THERAPY    7. Dizziness    - REFERRAL TO PHYSICAL THERAPY    8. Neuralgia of left glossopharyngeal nerve  Tegretol    9. Gait disorder    - REFERRAL TO PHYSICAL THERAPY    ___________________________________________________  PLAN: Medication and plan discussed with patient      ICD-10-CM ICD-9-CM    1. Chronic bilateral low back pain without sciatica  M54.50 724.2 REFERRAL TO PHYSICAL THERAPY    G89.29 338.29    2. Polyneuropathy  G62.9 356.9    3. Migraine without status migrainosus, not intractable, unspecified migraine type  G43.909 346.90    4.  POORNIMA (generalized anxiety disorder) F41.1 300.02    5. Lumbar radiculopathy  M54.16 724.4 REFERRAL TO PHYSICAL THERAPY   6. Frequent falls  R29.6 V15.88 REFERRAL TO PHYSICAL THERAPY   7. Dizziness  R42 780.4 REFERRAL TO PHYSICAL THERAPY   8. Neuralgia of left glossopharyngeal nerve  G52.1 352.1    9. Gait disorder  R26.9 781.2 REFERRAL TO PHYSICAL THERAPY     Follow-up and Dispositions    · Return in about 3 months (around 7/27/2022). I was in the office while conducting this encounter. Consent:  She and/or her healthcare decision maker is aware that this patient-initiated Telehealth encounter is a billable service, with coverage as determined by her insurance carrier.  She is aware that she may receive a bill and has provided verbal consent to proceed: Yes        Total Time: 30 minutes, 20 discussing above/answering questions/formulating plan              ___________________________________________________    Attending Physician: Paige Johnson MD

## 2022-06-06 ENCOUNTER — DOCUMENTATION ONLY (OUTPATIENT)
Dept: NEUROLOGY | Age: 82
End: 2022-06-06

## 2022-06-06 NOTE — PROGRESS NOTES
Faxed signed [facility] plan [facility] care to facility at [phone] and received fax confirmation.

## 2022-07-12 ENCOUNTER — TRANSCRIBE ORDER (OUTPATIENT)
Dept: SCHEDULING | Age: 82
End: 2022-07-12

## 2022-07-12 DIAGNOSIS — C34.12 MALIGNANT NEOPLASM OF UPPER LOBE OF LEFT LUNG (HCC): Primary | ICD-10-CM

## 2022-07-13 RX ORDER — FUROSEMIDE 20 MG/1
TABLET ORAL
Qty: 90 TABLET | Refills: 0 | Status: SHIPPED | OUTPATIENT
Start: 2022-07-13 | End: 2022-08-24

## 2022-07-29 ENCOUNTER — OFFICE VISIT (OUTPATIENT)
Dept: NEUROLOGY | Age: 82
End: 2022-07-29
Payer: MEDICARE

## 2022-07-29 VITALS
DIASTOLIC BLOOD PRESSURE: 86 MMHG | TEMPERATURE: 97.8 F | SYSTOLIC BLOOD PRESSURE: 130 MMHG | WEIGHT: 216.2 LBS | OXYGEN SATURATION: 95 % | BODY MASS INDEX: 40.82 KG/M2 | HEIGHT: 61 IN | HEART RATE: 73 BPM

## 2022-07-29 DIAGNOSIS — F41.1 GAD (GENERALIZED ANXIETY DISORDER): ICD-10-CM

## 2022-07-29 DIAGNOSIS — G43.909 MIGRAINE WITHOUT STATUS MIGRAINOSUS, NOT INTRACTABLE, UNSPECIFIED MIGRAINE TYPE: ICD-10-CM

## 2022-07-29 DIAGNOSIS — M35.9 AUTOIMMUNE DISEASE (HCC): ICD-10-CM

## 2022-07-29 DIAGNOSIS — M54.50 CHRONIC BILATERAL LOW BACK PAIN WITHOUT SCIATICA: ICD-10-CM

## 2022-07-29 DIAGNOSIS — M54.16 LUMBAR RADICULOPATHY: ICD-10-CM

## 2022-07-29 DIAGNOSIS — R29.6 FREQUENT FALLS: ICD-10-CM

## 2022-07-29 DIAGNOSIS — G62.9 POLYNEUROPATHY: Primary | ICD-10-CM

## 2022-07-29 DIAGNOSIS — G89.29 CHRONIC BILATERAL LOW BACK PAIN WITHOUT SCIATICA: ICD-10-CM

## 2022-07-29 DIAGNOSIS — R26.9 GAIT DISORDER: ICD-10-CM

## 2022-07-29 PROCEDURE — 1123F ACP DISCUSS/DSCN MKR DOCD: CPT | Performed by: PSYCHIATRY & NEUROLOGY

## 2022-07-29 PROCEDURE — G8536 NO DOC ELDER MAL SCRN: HCPCS | Performed by: PSYCHIATRY & NEUROLOGY

## 2022-07-29 PROCEDURE — G8510 SCR DEP NEG, NO PLAN REQD: HCPCS | Performed by: PSYCHIATRY & NEUROLOGY

## 2022-07-29 PROCEDURE — G8400 PT W/DXA NO RESULTS DOC: HCPCS | Performed by: PSYCHIATRY & NEUROLOGY

## 2022-07-29 PROCEDURE — 1090F PRES/ABSN URINE INCON ASSESS: CPT | Performed by: PSYCHIATRY & NEUROLOGY

## 2022-07-29 PROCEDURE — 99214 OFFICE O/P EST MOD 30 MIN: CPT | Performed by: PSYCHIATRY & NEUROLOGY

## 2022-07-29 PROCEDURE — G8417 CALC BMI ABV UP PARAM F/U: HCPCS | Performed by: PSYCHIATRY & NEUROLOGY

## 2022-07-29 PROCEDURE — 1101F PT FALLS ASSESS-DOCD LE1/YR: CPT | Performed by: PSYCHIATRY & NEUROLOGY

## 2022-07-29 PROCEDURE — G8427 DOCREV CUR MEDS BY ELIG CLIN: HCPCS | Performed by: PSYCHIATRY & NEUROLOGY

## 2022-07-29 RX ORDER — LOSARTAN POTASSIUM 50 MG/1
50 TABLET ORAL DAILY
COMMUNITY
Start: 2022-05-21

## 2022-07-29 NOTE — PROGRESS NOTES
1. Have you been to the ER, urgent care clinic since your last visit? Hospitalized since your last visit? Seen at Urgent Care for stick in the leg. 2. Have you seen or consulted any other health care providers outside of the [address] [place] [address] since your last visit? Include any pap smears or colon screening.    Seen by Lupus/Arthritis       Chief Complaint   Patient presents with    Follow-up     Lupus flare up in past month

## 2022-07-29 NOTE — PROGRESS NOTES
Neurology Progress Note    NAME:  Claudene Boots   :   1940   MRN:   [id number]     Date/Time:  2022  Subjective: Claudene Boots is a 80 y.o. female here today for follow-up for pain, numbness or tingling sensation leg pain and back pain, anxiety, neuropathy. Patient says she is generally doing well neurologically, she says major problem is flaring up of lupus, however, she is following rheumatologist  She noted that her legs were swelling about 3 weeks ago, she was having a lot of joint pain  Patient noted that she has been doing fairly well, headache continues to improve. She says her ear and facial pain has has practically gone  Patient says she continues to follow-up with the nodule in the lung, pulmonology is following with patient having family history of cancer. She says she will be getting a   follow-up CT in couple of months. She says  once in a while she experiences a sharp head pain with dizziness but has not gotten any worse since. She says she occasionally experiences back pain, back pain is intermittent, sharp in nature, burning sensation that goes down to the legs causing weakness, numbness and tingling sensation of the legs. She says her headache is improved greatly     She however denies any fall, dysphagia or odynophagia.   I will continue current medications  Review of Systems - General ROS: positive for  - fatigue and sleep disturbance  Psychological ROS: positive for - anxiety, depression and sleep disturbances  Ophthalmic ROS: positive for - blurry vision and uses glasses  ENT ROS: positive for - headaches, tinnitus, vertigo and visual changes  Allergy and Immunology ROS: negative  Hematological and Lymphatic ROS: negative  Endocrine ROS: negative  Respiratory ROS: no cough, shortness of breath, or wheezing  Cardiovascular ROS: no chest pain or dyspnea on exertion  Gastrointestinal ROS: no abdominal pain, change in bowel habits, or black or bloody stools  Genito-Urinary ROS: no dysuria, trouble voiding, or hematuria  Musculoskeletal ROS: positive for - gait disturbance, joint pain, joint stiffness, joint swelling, muscle pain, muscular weakness and pain in back - bilateral and leg - bilateral  Neurological ROS: positive for - dizziness, gait disturbance, headaches, numbness/tingling and weakness  Dermatological ROS: negative       Medications reviewed:  Current Outpatient Medications   Medication Sig Dispense Refill    losartan (COZAAR) 50 mg tablet Take 50 mg by mouth in the morning. furosemide (LASIX) 20 mg tablet TAKE 1 TABLET BY MOUTH EVERY DAY 90 Tablet 0    potassium chloride SR (KLOR-CON 10) 10 mEq tablet TAKE 1 TABLET BY MOUTH EVERY DAY 90 Tablet 3    albuterol (PROVENTIL VENTOLIN) 2.5 mg /3 mL (0.083 %) nebu albuterol sulfate 2.5 mg/3 mL (0.083 %) solution for nebulization      diazePAM (VALIUM) 10 mg tablet Take 1 Tablet by mouth every twelve (12) hours. 180 Tablet 2    verapamil ER (VERELAN) 120 mg ER capsule TAKE 1 CAPSULE BY MOUTH EVERY DAY 90 Capsule 3    escitalopram oxalate (LEXAPRO) 10 mg tablet Take 1 Tablet by mouth daily. (Patient taking differently: Take 20 mg by mouth in the morning.) 90 Tablet 4    tiotropium-olodateroL (STIOLTO RESPIMAT) 2.5-2.5 mcg/actuation inhaler Stiolto Respimat 2.5 mcg-2.5 mcg/actuation solution for inhalation      folic acid (FOLVITE) 1 mg tablet Take 1 Tab by mouth daily. 30 Tab 3    OTHER Lumbar brace  Lumbar stenosis 1 Units 0    levothyroxine (SYNTHROID) 75 mcg tablet Take  by mouth daily. 0    aspirin delayed-release 81 mg tablet Take  by mouth daily. hydroxychloroquine (PLAQUENIL) 200 mg tablet Take 200 mg by mouth daily. methotrexate (RHEUMATREX) 2.5 mg tablet Take  by mouth every Sunday. 6 capsules      Oxygen nightly. 2 liters      ascorbic acid (VITAMIN C PO) Take  by mouth as needed.  (Patient not taking: Reported on 7/29/2022)      clobetasol (TEMOVATE) 0.05 % topical cream apply to affected area twice a day externally (Patient not taking: No sig reported)  0    levomefolate-B6-meB12-algal oil (METANX, ALGAL OIL,) 3 mg-35 mg-2 mg -90.314 mg cap Take 1 Cap by mouth daily. (Patient not taking: No sig reported) 30 Cap 3    diclofenac (VOLTAREN) 1 % gel  (Patient not taking: No sig reported)  0    scopolamine (TRANSDERM-SCOP) 1 mg over 3 days pt3d 1 Patch by TransDERmal route every seventy-two (72) hours. (Patient not taking: No sig reported) 10 Patch 2    pregabalin (LYRICA) 75 mg capsule Take 1 Cap by mouth two (2) times a day. Max Daily Amount: 150 mg. (Patient not taking: No sig reported) 60 Cap 1    nortriptyline (PAMELOR) 10 mg capsule Take 1 Cap by mouth nightly. (Patient not taking: No sig reported) 30 Cap 1    cholecalciferol, vitamin D3, 50 mcg (2,000 unit) tab Take  by mouth daily.  (Patient not taking: Reported on 7/29/2022)          Objective:   Vitals:  Vitals:    07/29/22 1100   BP: 130/86   Pulse: 73   Temp: 97.8 °F (36.6 °C)   TempSrc: Temporal   SpO2: 95%   Weight: 216 lb 3.2 oz (98.1 kg)   Height: 5' 1\" (1.549 m)   PainSc:   0 - No pain               Lab Data Reviewed:  Lab Results   Component Value Date/Time    WBC 9.0 02/13/2019 11:52 AM    HCT 40.8 02/13/2019 11:52 AM    HGB 13.1 02/13/2019 11:52 AM    PLATELET 855 10/88/3393 11:52 AM       No results found for: NA, K, CL, CO2, GLU, BUN, CREA, CA    No components found for: TROPQUANT    No results found for: PAMELA      No results found for: HBA1C, TPF3MYOL, AUZ7KQYM     Lab Results   Component Value Date/Time    Vitamin B12 426 09/11/2018 10:38 AM       No results found for: PAMELA, ANARX, ANAIGG, XBANA    No results found for: CHOL, CHOLPOCT, CHOLX, CHLST, CHOLV, HDL, HDLPOC, HDLP, LDL, LDLCPOC, LDLC, DLDLP, VLDLC, VLDL, TGLX, TRIGL, TRIGP, TGLPOCT, CHHD, CHHDX      CT Results (recent):  Results from [place] encounter on 03/29/22    CT CHEST WO CONT    Narrative  INDICATION: Lung carcinoma    COMPARISON: September 21, 2021    CONTRAST: None.    TECHNIQUE:  5 mm axial images were obtained through the chest. Coronal and  sagittal reformats were generated. CT dose reduction was achieved through use  of a standardized protocol tailored for this examination and automatic exposure  control for dose modulation. The absence of intravenous contrast reduces the sensitivity for evaluation of  the mediastinum, elen, vasculature, and upper abdominal organs. FINDINGS:    CHEST WALL: No mass or axillary lymphadenopathy. THYROID: No nodule. MEDIASTINUM: No mass or lymphadenopathy. ELEN: No mass or lymphadenopathy. THORACIC AORTA: No aneurysm. MAIN PULMONARY ARTERY: Normal in caliber. TRACHEA/BRONCHI: Patent. ESOPHAGUS: No wall thickening or dilatation. HEART: Prominent size  PLEURA: No effusion or pneumothorax. LUNGS: Interval surgical removal of the left apical lesion, minimal postsurgical  scarring without residual or recurrent disease suspected at this time. Attention  to follow up. There is no new parenchymal lesions seen. INCIDENTALLY IMAGED UPPER ABDOMEN: No significant abnormality in the  incidentally imaged upper abdomen. BONES: No destructive bone lesion. Impression  Postsurgical left upper lobe without evidence at this time to suggest residual  or recurrent lesion. MRI Results (recent):  Results from [place] encounter on 07/20/20    MRI BRAIN W WO CONT    Narrative  Clinical history: Dizziness and giddiness  INDICATION:   Dizziness and giddiness    COMPARISON: 5/16/2018, 4/19/2017  TECHNIQUE: MR examination of the brain includes axial and sagittal T1 , axial  T2, axial FLAIR, axial gradient echo, axial DWI, coronal T1 . Pre and post  contrast axial T1-weighted imaging. Postcontrast T1-weighted imaging coronal  plane. CONTRAST: The patient was administered gadolinium-based contrast material,  subsequently axial and sagittal T1-weighted postcontrast imaging was obtained.     FINDINGS:  There is no intracranial mass, hemorrhage or acute infarction. Confluent periventricular and scattered foci of increased T2 signal intensity  corona radiata consistent  Not significantly changed. Trace fluid in the inferior mastoid air cell on the  left. IACs are symmetric in size. The left vertebral artery slightly larger than  the right. Mild mucoperiosteal thickening in the right and left maxillary  sinuses. . There is no abnormal parenchymal enhancement. There is no abnormal  meningeal enhancement demonstrated. The brain architecture is normal. There is  no evidence of midline shift or mass-effect. The ventricles are normal in size,  position and configuration. There are no extra-axial fluid collections. Major  intracranial vascular flow-voids are unremarkable. The orbits are grossly  symmetric. Dural venous sinuses are grossly unremarkable. There is no Chiari or  syrinx. Pituitary and infundibulum grossly unremarkable. Cerebellopontine angles  are unremarkable. No skull base mass is identified. Cavernous sinuses are  symmetric. The mastoid air cells and are well pneumatized and clear. Impression  IMPRESSION:  Mild/moderate chronic microvascular ischemic change. Trace paranasal sinus disease. No intracranial mass, hemorrhage or evidence of acute infarction. IR Results (recent):  No results found for this or any previous visit. VAS/US Results (recent):  No results found for this or any previous visit. PHYSICAL EXAM:  General:    Alert, cooperative, no distress, appears stated age. Head:   Normocephalic, without obvious abnormality, atraumatic. Eyes:   Conjunctivae/corneas clear. PERRLA  Nose:  Nares normal. No drainage or sinus tenderness. Throat:    Lips, mucosa, and tongue normal.  No Thrush  Neck:  Supple, symmetrical,  no adenopathy, thyroid: non tender    no carotid bruit and no JVD. Back:    Symmetric, bilateral tenderness. Lungs:   Clear to auscultation bilaterally. No Wheezing or Rhonchi. No rales.   Chest wall:  No tenderness or deformity. No Accessory muscle use. Heart:   Regular rate and rhythm,  no murmur, rub or gallop. Abdomen:   Soft, non-tender. Not distended. Bowel sounds normal. No masses  Extremities: Extremities normal, atraumatic, No cyanosis. No edema. No clubbing  Skin:     Texture, turgor normal. No rashes or lesions. Not Jaundiced  Lymph nodes: Cervical, supraclavicular normal.  Psych:  Good insight. Not depressed. Anxious. NEUROLOGICAL EXAM:  Appearance: The patient is well developed, well nourished, provides a coherent history and is in no acute distress. Mental Status: Oriented to time, place and person. Mood and affect appropriate. Cranial Nerves:   Intact visual fields. Fundi are benign. GITA, EOM's full, no nystagmus, no ptosis. Facial sensation is normal. Corneal reflexes are intact. Facial movement is symmetric. Hearing is normal bilaterally. Palate is midline with normal sternocleidomastoid and trapezius muscles are normal. Tongue is midline. Motor:  5/5 strength in upper extremity and 5-/5 lower extremity proximal and distal muscles. Normal bulk and tone. No fasciculations. Reflexes:   Deep tendon reflexes 2+/4 and symmetrical.   Sensory:    Decreased sensation  to touch, pinprick and vibration bilateral lower extremity up to the knee upper extremity in glove distribution. Gait:  Normal gait. Tremor:    Mild tremor noted. Cerebellar:  No cerebellar signs present. Neurovascular:  Normal heart sounds and regular rhythm, peripheral pulses intact, and no carotid bruits. Assesment  1. Polyneuropathy  Neurontin    2. Migraine without status migrainosus, not intractable, unspecified migraine type  Continue management    3. POORNIMA (generalized anxiety disorder)  Stable    4. Lumbar radiculopathy  Intermittent physical therapy  5. Frequent falls  Intermittent physical therapy    6. Chronic bilateral low back pain without sciatica  Intermittent physical therapy    7. Autoimmune disease [facility])  Following rheumatology    8. Gait disorder  Intermittent physical therapy    ___________________________________________________  PLAN: Medication and plan discussed with patient      ICD-10-CM ICD-9-CM    1. Polyneuropathy  G62.9 356.9       2. Migraine without status migrainosus, not intractable, unspecified migraine type  G43.909 346.90       3. POORNIMA (generalized anxiety disorder)  F41.1 300.02       4. Lumbar radiculopathy  M54.16 724.4       5. Frequent falls  R29.6 V15.88       6. Chronic bilateral low back pain without sciatica  M54.50 724.2     G89.29 338.29       7. Autoimmune disease ([address]  M35.9 279.49       8. Gait disorder  R26.9 781.2         Follow-up and Dispositions    Return in about 4 months (around 11/29/2022).          ___________________________________________________    Attending Physician: Brent Bedoya MD

## 2022-08-18 DIAGNOSIS — F41.9 ANXIETY: ICD-10-CM

## 2022-08-18 DIAGNOSIS — R42 DIZZINESS: ICD-10-CM

## 2022-08-22 ENCOUNTER — HOSPITAL ENCOUNTER (OUTPATIENT)
Dept: CT IMAGING | Age: 82
Discharge: HOME | End: 2022-08-22
Attending: INTERNAL MEDICINE
Payer: MEDICARE

## 2022-08-22 DIAGNOSIS — C34.12 MALIGNANT NEOPLASM OF UPPER LOBE OF LEFT LUNG (HCC): ICD-10-CM

## 2022-08-22 PROCEDURE — 71250 CT THORAX DX C-: CPT

## 2022-08-24 DIAGNOSIS — G43.909 MIGRAINE WITHOUT STATUS MIGRAINOSUS, NOT INTRACTABLE, UNSPECIFIED MIGRAINE TYPE: ICD-10-CM

## 2022-08-24 RX ORDER — DIAZEPAM 10 MG/1
10 TABLET ORAL EVERY 12 HOURS
Qty: 180 TABLET | Refills: 5 | Status: SHIPPED | OUTPATIENT
Start: 2022-08-24

## 2022-08-24 RX ORDER — FUROSEMIDE 20 MG/1
TABLET ORAL
Qty: 90 TABLET | Refills: 0 | Status: SHIPPED | OUTPATIENT
Start: 2022-08-24 | End: 2022-10-17

## 2022-08-25 RX ORDER — VERAPAMIL HYDROCHLORIDE 120 MG/1
CAPSULE, EXTENDED RELEASE ORAL
Qty: 90 CAPSULE | Refills: 3 | Status: SHIPPED | OUTPATIENT
Start: 2022-08-25

## 2022-09-10 ENCOUNTER — TRANSCRIBE ORDER (OUTPATIENT)
Dept: SCHEDULING | Age: 82
End: 2022-09-10

## 2022-09-10 DIAGNOSIS — C34.12 MALIGNANT NEOPLASM OF UPPER LOBE BRONCHUS, LEFT (HCC): Primary | ICD-10-CM

## 2022-10-04 ENCOUNTER — TELEPHONE (OUTPATIENT)
Dept: NEUROLOGY | Age: 82
End: 2022-10-04

## 2022-10-04 NOTE — TELEPHONE ENCOUNTER
VOICEMAIL    Pt got letter about Dr. Juvenal Walker leaving. Wants to speak with Dr. Juvenal Walker about where he is going since she has been a pt for 20 years.  Please call [phone]

## 2022-10-04 NOTE — TELEPHONE ENCOUNTER
Call placed to patient. 2 identifiers received. Patient advised that at this time we had no information on Dr. Eliud Meadows. Patient indicated understanding.

## 2022-10-17 RX ORDER — FUROSEMIDE 20 MG/1
TABLET ORAL
Qty: 90 TABLET | Refills: 0 | Status: SHIPPED | OUTPATIENT
Start: 2022-10-17

## 2022-10-19 ENCOUNTER — OFFICE VISIT (OUTPATIENT)
Dept: NEUROLOGY | Age: 82
End: 2022-10-19

## 2022-10-19 VITALS
DIASTOLIC BLOOD PRESSURE: 78 MMHG | SYSTOLIC BLOOD PRESSURE: 122 MMHG | WEIGHT: 212 LBS | BODY MASS INDEX: 40.02 KG/M2 | HEART RATE: 80 BPM | HEIGHT: 61 IN | RESPIRATION RATE: 17 BRPM | OXYGEN SATURATION: 98 % | TEMPERATURE: 98 F

## 2022-10-19 DIAGNOSIS — G52.1 NEURALGIA OF LEFT GLOSSOPHARYNGEAL NERVE: ICD-10-CM

## 2022-10-19 DIAGNOSIS — R20.2 PARESTHESIA: ICD-10-CM

## 2022-10-19 DIAGNOSIS — F41.1 GAD (GENERALIZED ANXIETY DISORDER): ICD-10-CM

## 2022-10-19 DIAGNOSIS — G43.909 MIGRAINE WITHOUT STATUS MIGRAINOSUS, NOT INTRACTABLE, UNSPECIFIED MIGRAINE TYPE: Primary | ICD-10-CM

## 2022-10-19 DIAGNOSIS — B02.29 PHN (POSTHERPETIC NEURALGIA): ICD-10-CM

## 2022-10-19 DIAGNOSIS — G62.9 POLYNEUROPATHY: ICD-10-CM

## 2022-10-19 DIAGNOSIS — M54.16 LUMBAR RADICULOPATHY: ICD-10-CM

## 2022-10-19 DIAGNOSIS — G50.8 TRIGEMINAL NEURITIS: ICD-10-CM

## 2022-10-19 DIAGNOSIS — R42 DIZZINESS: ICD-10-CM

## 2022-10-19 RX ORDER — CARBAMAZEPINE 100 MG/1
100 TABLET, EXTENDED RELEASE ORAL 2 TIMES DAILY
Qty: 60 TABLET | Refills: 5 | Status: SHIPPED | OUTPATIENT
Start: 2022-10-19

## 2022-10-19 RX ORDER — PREGABALIN 75 MG/1
75 CAPSULE ORAL 2 TIMES DAILY
Qty: 60 CAPSULE | Refills: 5 | Status: SHIPPED | OUTPATIENT
Start: 2022-10-19

## 2022-10-19 NOTE — PROGRESS NOTES
Chief Complaint   Patient presents with    Follow-up    Migraine     polyneuropathy    Stroke    1. Have you been to the ER, urgent care clinic since your last visit? No Hospitalized since your last visit?  no    2. Have you seen or consulted any other health care providers outside of the [address] [place] [address] since your last visit? Include any pap smears or colon screening.

## 2022-10-26 RX ORDER — PREDNISOLONE ACETATE 10 MG/ML
SUSPENSION/ DROPS OPHTHALMIC
COMMUNITY
Start: 2022-10-17

## 2022-10-26 RX ORDER — OFLOXACIN 3 MG/ML
SOLUTION/ DROPS OPHTHALMIC
COMMUNITY
Start: 2022-10-14

## 2022-10-26 NOTE — PERIOP NOTES
[facility]  Ambulatory Surgery Unit  Pre-operative Instructions    Surgery/Procedure Date  Tuesday November 8th            Tentative Arrival Time TBD      1. On the day of your surgery/procedure, please report to the Ambulatory Surgery Unit Registration Desk and sign in at your designated time. The Ambulatory Surgery Unit is located in [facility] on the [facility] side of the \[facility]\"" across from the [address] [place] [address]. Please have all of your health insurance cards, co-payment, and a photo ID.    **TWO adults may accompany you the day of the procedure. We have limited seating available. If our waiting room is at capacity, your ride may be asked to remain in their vehicle. No one under 15 is allowed in the waiting room. 2. You must have someone with you to drive you home, as you should not drive a car for 24 hours following anesthesia. Please make arrangements for a responsible adult friend or family member to stay with you for at least the first 24 hours after your surgery. 3. Do not have anything to eat or drink (including water, gum, mints, coffee, juice) after 11:59 PM on Monday 11/7 . This may not apply to medications prescribed by your physician. (Please note below the special instructions with medications to take the morning of surgery, if applicable.)    4. We recommend you do not drink any alcoholic beverages for 24 hours before and after your surgery. 5. Contact your surgeons office for instructions on the following medications: non-steroidal anti-inflammatory drugs (i.e. Advil, Aleve), vitamins, and supplements. (Some surgeons will want you to stop these medications prior to surgery and others may allow you to take them)   **If you are currently taking Plavix, Coumadin, Aspirin and/or other blood-thinning agents, contact your surgeon for instructions. ** Your surgeon will partner with the physician prescribing these medications to determine if it is safe to stop or if you need to continue taking. Please do not stop taking these medications without instructions from your surgeon. 6. In an effort to help prevent surgical site infection, we ask that you shower with an anti-bacterial soap (i.e. Dial/Safeguard, or the soap provided to you at your preadmission testing appointment) either the night prior to and/or on the morning of surgery, using a fresh towel after each shower. (Please begin this process with fresh bed linens.) Do not apply any lotions, powders, or deodorants after the shower on the day of your procedure. If applicable, please do not shave the operative site for 48 hours prior to surgery. 7. Wear comfortable clothes. Wear glasses instead of contacts. Do not bring any jewelry or money (other than copays or fees as instructed). Do not wear make-up, particularly mascara, the morning of your surgery. Do not wear nail polish, particularly if you are having foot /hand surgery. Wear your hair loose or down, no ponytails, buns, jer pins or clips. All body piercings must be removed. 8. You should understand that if you do not follow these instructions your surgery may be cancelled. If your physical condition changes (i.e. fever, cold or flu) please contact your surgeon as soon as possible. 9. It is important that you be on time. If a situation occurs where you may be late, or if you have any questions or problems, please call [phone].    10. Your surgery time may be subject to change. You will receive a phone call the day prior to surgery to confirm your arrival time. 11. Pediatric patients: please bring a change of clothes, diapers, bottle/sippy cup, pacifier, etc.      Special Instructions: bring inhaler with you on day of surgery    Take all medications and inhalers, as prescribed, on the morning of surgery with a sip of water      Insulin Dependent Diabetic patients: Take your diabetic medications as prescribed the day before surgery.   Hold all diabetic medications the day of surgery. If you are scheduled to arrive for surgery after 8:00 AM, and your AM blood sugar is >200, please call Ambulatory Surgery. I understand a pre-operative phone call will be made to verify my surgery time. In the event that I am not available, I give permission for a message to be left on my answering service and/or with another person?       Yes    Reviewed instructions via telephone, patient verbalized understanding         ___________________      ___________________      ________________  (Signature of Patient)          (Witness)                   (Date and Time)

## 2022-10-31 RX ORDER — POTASSIUM CHLORIDE 750 MG/1
10 TABLET, FILM COATED, EXTENDED RELEASE ORAL EVERY MORNING
COMMUNITY

## 2022-11-01 NOTE — PERIOP NOTES
Sent e-mail to [facility] at Dr [facility] office requesting orders to be faxed to asu pat

## 2022-11-07 ENCOUNTER — ANESTHESIA EVENT (OUTPATIENT)
Dept: SURGERY | Age: 82
End: 2022-11-07
Payer: MEDICARE

## 2022-11-08 ENCOUNTER — HOSPITAL ENCOUNTER (OUTPATIENT)
Age: 82
Setting detail: OUTPATIENT SURGERY
Discharge: HOME | End: 2022-11-08
Attending: OPHTHALMOLOGY | Admitting: OPHTHALMOLOGY
Payer: MEDICARE

## 2022-11-08 ENCOUNTER — ANESTHESIA (OUTPATIENT)
Dept: SURGERY | Age: 82
End: 2022-11-08
Payer: MEDICARE

## 2022-11-08 VITALS
TEMPERATURE: 97.3 F | DIASTOLIC BLOOD PRESSURE: 98 MMHG | WEIGHT: 214 LBS | RESPIRATION RATE: 20 BRPM | OXYGEN SATURATION: 96 % | BODY MASS INDEX: 40.4 KG/M2 | HEART RATE: 62 BPM | SYSTOLIC BLOOD PRESSURE: 127 MMHG | HEIGHT: 61 IN

## 2022-11-08 PROBLEM — H25.811 COMBINED FORM OF AGE-RELATED CATARACT, RIGHT EYE: Status: ACTIVE | Noted: 2022-11-08

## 2022-11-08 PROCEDURE — 74011250636 HC RX REV CODE- 250/636: Performed by: ANESTHESIOLOGY

## 2022-11-08 PROCEDURE — 74011000250 HC RX REV CODE- 250

## 2022-11-08 PROCEDURE — 76060000073 HC AMB SURG ANES FIRST 0.5 HR: Performed by: OPHTHALMOLOGY

## 2022-11-08 PROCEDURE — 74011000250 HC RX REV CODE- 250: Performed by: OPHTHALMOLOGY

## 2022-11-08 PROCEDURE — 76030000002 HC AMB SURG OR TIME FIRST 0.: Performed by: OPHTHALMOLOGY

## 2022-11-08 PROCEDURE — 74011250636 HC RX REV CODE- 250/636: Performed by: OPHTHALMOLOGY

## 2022-11-08 PROCEDURE — 74011250636 HC RX REV CODE- 250/636: Performed by: NURSE ANESTHETIST, CERTIFIED REGISTERED

## 2022-11-08 PROCEDURE — 2709999900 HC NON-CHARGEABLE SUPPLY: Performed by: OPHTHALMOLOGY

## 2022-11-08 PROCEDURE — 74011000250 HC RX REV CODE- 250: Performed by: ANESTHESIOLOGY

## 2022-11-08 PROCEDURE — V2632 POST CHMBR INTRAOCULAR LENS: HCPCS | Performed by: OPHTHALMOLOGY

## 2022-11-08 PROCEDURE — 76210000050 HC AMBSU PH II REC 0.5 TO 1 HR: Performed by: OPHTHALMOLOGY

## 2022-11-08 DEVICE — IMPLANTABLE DEVICE: Type: IMPLANTABLE DEVICE | Site: EYE | Status: FUNCTIONAL

## 2022-11-08 RX ORDER — CYCLOPENTOLATE HYDROCHLORIDE 20 MG/ML
1 SOLUTION/ DROPS OPHTHALMIC
Status: COMPLETED | OUTPATIENT
Start: 2022-11-08 | End: 2022-11-08

## 2022-11-08 RX ORDER — SODIUM CHLORIDE, SODIUM LACTATE, POTASSIUM CHLORIDE, CALCIUM CHLORIDE 600; 310; 30; 20 MG/100ML; MG/100ML; MG/100ML; MG/100ML
25 INJECTION, SOLUTION INTRAVENOUS CONTINUOUS
Status: DISCONTINUED | OUTPATIENT
Start: 2022-11-08 | End: 2022-11-08 | Stop reason: HOSPADM

## 2022-11-08 RX ORDER — ONDANSETRON 2 MG/ML
4 INJECTION INTRAMUSCULAR; INTRAVENOUS AS NEEDED
Status: DISCONTINUED | OUTPATIENT
Start: 2022-11-08 | End: 2022-11-08 | Stop reason: HOSPADM

## 2022-11-08 RX ORDER — SODIUM CHLORIDE 0.9 % (FLUSH) 0.9 %
5-40 SYRINGE (ML) INJECTION EVERY 8 HOURS
Status: DISCONTINUED | OUTPATIENT
Start: 2022-11-08 | End: 2022-11-08 | Stop reason: HOSPADM

## 2022-11-08 RX ORDER — LIDOCAINE HYDROCHLORIDE 40 MG/ML
INJECTION, SOLUTION RETROBULBAR; TOPICAL AS NEEDED
Status: DISCONTINUED | OUTPATIENT
Start: 2022-11-08 | End: 2022-11-08 | Stop reason: HOSPADM

## 2022-11-08 RX ORDER — LIDOCAINE HYDROCHLORIDE 10 MG/ML
0.1 INJECTION, SOLUTION EPIDURAL; INFILTRATION; INTRACAUDAL; PERINEURAL AS NEEDED
Status: DISCONTINUED | OUTPATIENT
Start: 2022-11-08 | End: 2022-11-08 | Stop reason: HOSPADM

## 2022-11-08 RX ORDER — PROPARACAINE HYDROCHLORIDE 5 MG/ML
1 SOLUTION/ DROPS OPHTHALMIC
Status: COMPLETED | OUTPATIENT
Start: 2022-11-08 | End: 2022-11-08

## 2022-11-08 RX ORDER — NEOMYCIN SULFATE, POLYMYXIN B SULFATE AND DEXAMETHASONE 3.5; 10000; 1 MG/ML; [USP'U]/ML; MG/ML
SUSPENSION/ DROPS OPHTHALMIC AS NEEDED
Status: DISCONTINUED | OUTPATIENT
Start: 2022-11-08 | End: 2022-11-08 | Stop reason: HOSPADM

## 2022-11-08 RX ORDER — TROPICAMIDE 10 MG/ML
1 SOLUTION/ DROPS OPHTHALMIC
Status: COMPLETED | OUTPATIENT
Start: 2022-11-08 | End: 2022-11-08

## 2022-11-08 RX ORDER — FENTANYL CITRATE 50 UG/ML
25 INJECTION, SOLUTION INTRAMUSCULAR; INTRAVENOUS
Status: DISCONTINUED | OUTPATIENT
Start: 2022-11-08 | End: 2022-11-08 | Stop reason: HOSPADM

## 2022-11-08 RX ORDER — DIPHENHYDRAMINE HYDROCHLORIDE 50 MG/ML
12.5 INJECTION, SOLUTION INTRAMUSCULAR; INTRAVENOUS AS NEEDED
Status: DISCONTINUED | OUTPATIENT
Start: 2022-11-08 | End: 2022-11-08 | Stop reason: HOSPADM

## 2022-11-08 RX ORDER — SODIUM CHLORIDE 0.9 % (FLUSH) 0.9 %
5-40 SYRINGE (ML) INJECTION AS NEEDED
Status: DISCONTINUED | OUTPATIENT
Start: 2022-11-08 | End: 2022-11-08 | Stop reason: HOSPADM

## 2022-11-08 RX ORDER — MIDAZOLAM HYDROCHLORIDE 1 MG/ML
INJECTION, SOLUTION INTRAMUSCULAR; INTRAVENOUS AS NEEDED
Status: DISCONTINUED | OUTPATIENT
Start: 2022-11-08 | End: 2022-11-08 | Stop reason: HOSPADM

## 2022-11-08 RX ORDER — PREDNISOLONE ACETATE 10 MG/ML
SUSPENSION/ DROPS OPHTHALMIC AS NEEDED
Status: DISCONTINUED | OUTPATIENT
Start: 2022-11-08 | End: 2022-11-08 | Stop reason: HOSPADM

## 2022-11-08 RX ORDER — PHENYLEPHRINE HYDROCHLORIDE 25 MG/ML
1 SOLUTION/ DROPS OPHTHALMIC
Status: COMPLETED | OUTPATIENT
Start: 2022-11-08 | End: 2022-11-08

## 2022-11-08 RX ORDER — TROPICAMIDE 10 MG/ML
SOLUTION/ DROPS OPHTHALMIC
Status: COMPLETED
Start: 2022-11-08 | End: 2022-11-08

## 2022-11-08 RX ORDER — SODIUM CHLORIDE 9 MG/ML
25 INJECTION, SOLUTION INTRAVENOUS ONCE
Status: COMPLETED | OUTPATIENT
Start: 2022-11-08 | End: 2022-11-08

## 2022-11-08 RX ADMIN — TROPICAMIDE 1 DROP: 10 SOLUTION/ DROPS OPHTHALMIC at 08:08

## 2022-11-08 RX ADMIN — TROPICAMIDE 1 DROP: 10 SOLUTION/ DROPS OPHTHALMIC at 08:00

## 2022-11-08 RX ADMIN — CYCLOPENTOLATE HYDROCHLORIDE 1 DROP: 20 SOLUTION/ DROPS OPHTHALMIC at 07:52

## 2022-11-08 RX ADMIN — CYCLOPENTOLATE HYDROCHLORIDE 1 DROP: 20 SOLUTION/ DROPS OPHTHALMIC at 08:04

## 2022-11-08 RX ADMIN — CYCLOPENTOLATE HYDROCHLORIDE 1 DROP: 20 SOLUTION/ DROPS OPHTHALMIC at 08:00

## 2022-11-08 RX ADMIN — PROPARACAINE HYDROCHLORIDE 1 DROP: 5 SOLUTION/ DROPS OPHTHALMIC at 08:08

## 2022-11-08 RX ADMIN — MIDAZOLAM HYDROCHLORIDE 1 MG: 1 INJECTION, SOLUTION INTRAMUSCULAR; INTRAVENOUS at 09:02

## 2022-11-08 RX ADMIN — PROPARACAINE HYDROCHLORIDE 1 DROP: 5 SOLUTION/ DROPS OPHTHALMIC at 07:52

## 2022-11-08 RX ADMIN — PHENYLEPHRINE HYDROCHLORIDE 1 DROP: 25 SOLUTION/ DROPS OPHTHALMIC at 08:04

## 2022-11-08 RX ADMIN — PHENYLEPHRINE HYDROCHLORIDE 1 DROP: 25 SOLUTION/ DROPS OPHTHALMIC at 07:52

## 2022-11-08 RX ADMIN — PHENYLEPHRINE HYDROCHLORIDE 1 DROP: 25 SOLUTION/ DROPS OPHTHALMIC at 08:08

## 2022-11-08 RX ADMIN — PHENYLEPHRINE HYDROCHLORIDE 1 DROP: 25 SOLUTION/ DROPS OPHTHALMIC at 08:00

## 2022-11-08 RX ADMIN — MIDAZOLAM HYDROCHLORIDE 1 MG: 1 INJECTION, SOLUTION INTRAMUSCULAR; INTRAVENOUS at 08:59

## 2022-11-08 RX ADMIN — SODIUM CHLORIDE 25 ML/HR: 9 INJECTION, SOLUTION INTRAVENOUS at 08:01

## 2022-11-08 RX ADMIN — TROPICAMIDE 1 DROP: 10 SOLUTION/ DROPS OPHTHALMIC at 08:04

## 2022-11-08 RX ADMIN — CYCLOPENTOLATE HYDROCHLORIDE 1 DROP: 20 SOLUTION/ DROPS OPHTHALMIC at 08:08

## 2022-11-08 RX ADMIN — TROPICAMIDE 1 DROP: 10 SOLUTION/ DROPS OPHTHALMIC at 07:52

## 2022-11-08 RX ADMIN — SODIUM CHLORIDE, POTASSIUM CHLORIDE, SODIUM LACTATE AND CALCIUM CHLORIDE: 600; 310; 30; 20 INJECTION, SOLUTION INTRAVENOUS at 08:53

## 2022-11-08 NOTE — ANESTHESIA POSTPROCEDURE EVALUATION
Procedure(s):  RIGHT EYE PHACOEMULSIFICATION WITH INTRAOCULAR LENS IMPLANTATION. MAC    Anesthesia Post Evaluation      Multimodal analgesia: multimodal analgesia used between 6 hours prior to anesthesia start to PACU discharge  Patient location during evaluation: PACU  Patient participation: complete - patient participated  Level of consciousness: awake and alert  Pain score: 0  Airway patency: patent  Anesthetic complications: no  Cardiovascular status: acceptable  Respiratory status: acceptable  Hydration status: acceptable  Post anesthesia nausea and vomiting:  none  Final Post Anesthesia Temperature Assessment:  Normothermia (36.0-37.5 degrees C)      INITIAL Post-op Vital signs:   Vitals Value Taken Time   /98 11/08/22 0950   Temp 36.3 °C (97.3 °F) 11/08/22 0929   Pulse 62 11/08/22 0952   Resp 23 11/08/22 0952   SpO2 98 % 11/08/22 0952   Vitals shown include unvalidated device data.

## 2022-11-08 NOTE — PERIOP NOTES
Permission received to review discharge instructions and discuss private health information with daughter Georgette Hatfield. Patient states that family/friend will be with them for at least 24 hours following today's procedure. Warm blanket applied.

## 2022-11-08 NOTE — PROGRESS NOTES
Earl Huffman  1940  [id number]    Situation:  Verbal report given from: JOURDAN Snow RN  Procedure: Procedure(s):  RIGHT EYE PHACOEMULSIFICATION WITH INTRAOCULAR LENS IMPLANTATION    Background:    Preoperative diagnosis: RIGHT EYE CATARACT    Postoperative diagnosis: RIGHT EYE CATARACT    :  Dr. Tammie Hayden    Assistant(s): Circ-1: Elizabeth Alvarado RN  Scrub Tech-1: Amrik CARR RT    Specimens: * No specimens in log *    Assessment:  Intra-procedure medications         Anesthesia gave intra-procedure sedation and medications, see anesthesia flow sheet     Intravenous fluids: LR@ KVO     Vital signs stable       Recommendation:

## 2022-11-08 NOTE — OP NOTES
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 CDE 8.38    Implants:   Implant Name Type Inv. Item Serial No.  Lot No. LRB No. Used Action   LENS DIOPTER IOL SNGL 24.0 -- [employer] - [id number]  LENS DIOPTER IOL SNGL 24.0 -- [employer] [id number] [employer] NA Right 1 Implanted       Procedure: The patient was consented. The operative eye was confirmed and marked in the preoperative holding area. Dilation was confirmed and the patient was brought into the operating room. The eye was prepped and draped in the typical aseptic fashion. Lid speculum was placed. The superior cornea looked edematous so the paracentesis was made superonasally and preservative-free lidocaine was injected into the anterior chamber followed by viscoelastic (Viscoat) to fill the anterior chamber. A triplanar self-sealing temporal clear cornea incision was made with a 2.8 mm keratome temporally. A continuous curvilinear capsulorrhexis was initiated with a cystotome and completed with Utrata forceps. Hydrodissection and hydrodelination were performed with confirmed free rotation of the nucleus. Additional Viscoat and then Healon was injected in a soft-shell technique. Phacoemulsification was peformed in a divide-and-conquer fashion followed by cortical removal with irrigation/aspiration. The capsular bag was reinflated with viscoelastic (Healon).   The lens type and power was confirmed and the lens injected into the capsular bag.  Residual viscoelastic was removed and the eye was formed with BSS. Stromal hydration was performed. The wound and paracentesis were watertight at the end of the case. The lens was well centered and the pupil was round at the end of the case. The lid speculum was removed. Postoperative eyedrops of ofloxacin and Pred Forte were instilled in the eye. An eye shield was placed over the eye. The patient tolerated the surgery well and was taken to the postoperative holding area in a stable condition.     Ernesto Arnold MD    11/8/2022  9:29 AM

## 2022-11-08 NOTE — DISCHARGE INSTRUCTIONS
CATARACT POST-OP INSTRUCTIONS    DAY OF SURGERY:  Remove patch  2 hours after you get home & start drops      1. Antibiotic - TAN TOP (Ofloxacin)                  1 drop in operated eye every 2 hours while awake                    2. Steroid - PINK OR WHITE TOP (Prednisolone acetate)                  SHAKE WELL BEFORE USING (10-15X)                  1 drop in operated eye every 2 hours while awake.                                                                        ________________________________________________________________________    STARTING ON THE DAY AFTER SURGERY:     1. Antibiotic - TAN TOP (Ofloxacin)                 1 drop in operated eye 4x per day for 1 week then STOP                   2. Steroid - PINK OR WHITE TOP (Prednisolone acetate)                  SHAKE WELL BEFORE USING (10-15X)                  1 drop in operated eye every 2 hours while awake x  1  week, then                  1 drop in operated eye 4x per day x  1  week then,                  1 drop in operated eye 3x per day x  1  week then,                  1 drop in operated eye 2x per day x  1  week then,                  1 drop in operated eye 1x per day x  1  week then,                                                              SPACE OUT ALL EYE DROPS BY 5 MINUTES SO YOU DON'T 8 Alejoe Jomar Fuentesidi OUT                ONE MEDICATION WITH THE OTHER ONE.      EYE CARE:    1.  DO NOT RUB YOUR EYE!  2.  Wear eye shield when sleeping for 1st week after surgery  3. No heavy lifting (over 15 pounds) or bending below the waist or straining (hard coughing/sneezing/bowel movements - take stool softener if needed)  for 5 days after surgery. 4.  Do not get dirty water in your surgery eye. You can shower if you cover and close your surgery eye.  5.  No makeup for 5 days after surgery on or around the surgery eye. No gardening or swimming for 3 weeks. Wear safety glasses whenever dirt may get in your eyes.   6.  Use all other eye medications you were on before surgery unless instructed by your eye doctor not to do so.  7.  Wash your hands before touching your eye or your eye drops. Do not contaminate the tips of the bottles. 8.  Call immediately for severe pain, worse redness, decreased vision, new flashing lights or floaters. A scratchy sensation is normal and should get better as your eye heals. 9.  Please call with any questions about your postop care. You can reach Dr. Anayeli Lucas at phone number:  [phone] (office) or [phone] (cell) in an emergency. DO NOT TAKE SLEEPING MEDICATIONS OR ANTIANXIETY MEDICATIONS WHILE TAKING NARCOTIC PAIN MEDICATIONS,  ESPECIALLY THE NIGHT OF ANESTHESIA. CPAP PATIENTS BE SURE TO WEAR MACHINE WHENEVER NAPPING OR SLEEPING. DISCHARGE SUMMARY from Nurse    The following personal items collected during your admission are returned to you:   Dental Appliance: Dental Appliances: Lowers, Uppers  Vision: Visual Aid: Glasses  Hearing Aid:    Jewelry:    Clothing:    Other Valuables:    Valuables sent to safe:        PATIENT INSTRUCTIONS:    After general anesthesia or intravenous sedation, for 24 hours or while taking prescription Narcotics:  Someone should be with you for the next 24 hours. For your own safety, a responsible adult must drive you home. Limit your activities  Recommended activity: Rest today, up with assistance today. Do not climb stairs or shower unattended for the next 24 hours. Please start with a soft bland diet and advance as tolerated (no nausea) to regular diet. Do not drive and operate hazardous machinery  Do not make important personal or business decisions  Do  not drink alcoholic beverages  If you have not urinated within 8 hours after discharge, please contact your surgeon on call.     Report the following to your surgeon:  Excessive pain, swelling, redness or odor of or around the surgical area  Temperature over 100.5  Nausea and vomiting lasting longer than 4 hours or if unable to take medications    You will receive a Post Operative Call from one of the Recovery Room Nurses on the day after your surgery to check on you. It is very important for us to know how you are recovering after your surgery. If you have an issue or need to speak with someone, please call your surgeon, do not wait for the post operative call. You may receive an e-mail or letter in the mail from [facility] regarding your experience with us in the Ambulatory Surgery Unit. Your feedback is valuable to us and we appreciate your participation in the survey. .  If the above instructions are not adequate or you are having problems after your surgery, call the physician at his office number. We wish youre a speedy recovery ? What to do at Home:      *  Please give a list of your current medications to your Primary Care Provider. *  Please update this list whenever your medications are discontinued, doses are      changed, or new medications (including over-the-counter products) are added. *  Please carry medication information at all times in case of emergency situations. If you have not received your influenza and/or pneumococcal vaccine, please follow up with your primary care physician. The discharge information has been reviewed with the patient and caregiver. The patient and caregiver verbalized understanding. TO PREVENT AN INFECTION      [address] Lab[address] YOUR HANDS    To prevent infection, good handwashing is the most important thing you or your caregiver can do. Wash your hands with soap and water or use the hand  we gave you before you touch any wounds. USE CLEAN SHEETS    Use freshly cleaned sheets on your bed after surgery. To keep the surgery site clean, do not allow pets to sleep with you while your wound is still healing. STOP SMOKING    Stop smoking, or at least cut back on smoking    Smoking slows your healing.           CONTROL YOUR BLOOD SUGAR    High blood sugars slow wound healing. If you are diabetic, control your blood sugar levels before and after your surgery.

## 2022-11-08 NOTE — PERIOP NOTES
Pt tolerating liquids, vss.  Pt denies pain. D/c instructions reviewed, all questions answered. Reviewed when to call the doctor,diet, activity and  hygiene. Reviewed what to take for pain. Dr. Fei Long aware and discussed with pt's daughter about redness in the eye and small clot visible in R lateral outer portion of eye. Dr. Fei Long informed pt and Daughter it will take about a week to absorb. 1000-Pt voided x 1    1005-Transported via w/c to awaiting transportation. No complaints noted at time of d/c home.

## 2022-11-08 NOTE — ANESTHESIA PREPROCEDURE EVALUATION
Relevant Problems   ENDOCRINE   (+) Obesity, morbid (HCC)       Anesthetic History   No history of anesthetic complications            Review of Systems / Medical History  Patient summary reviewed, nursing notes reviewed and pertinent labs reviewed    Pulmonary    COPD            Comments: Former smoker  H/o Lung Cancer  Uses O2 2L/min nasal can at HS   Neuro/Psych         TIA ( multiple) and psychiatric history    Comments: Anxiety d/o  Depression  Vertigo  Migraines  H/o cerebral artery occlusion Cardiovascular    Hypertension              Exercise tolerance: >4 METS     GI/Hepatic/Renal     GERD (not active)           Endo/Other      Hypothyroidism: well controlled  Morbid obesity, arthritis and cancer (Lung, 11/21)     Other Findings   Comments: Cataracts    Lupus  Rheumatoid arthritis         Physical Exam    Airway  Mallampati: II  TM Distance: 4 - 6 cm  Neck ROM: normal range of motion   Mouth opening: Normal     Cardiovascular    Rhythm: regular  Rate: normal         Dental    Dentition: Full upper dentures and Full lower dentures     Pulmonary  Breath sounds clear to auscultation               Abdominal  GI exam deferred       Other Findings            Anesthetic Plan    ASA: 3  Anesthesia type: MAC          Induction: Intravenous  Anesthetic plan and risks discussed with: Patient

## 2023-01-22 NOTE — PROGRESS NOTES
Rendering provider is unavailable to complete documentation for this encounter. This note is being closed administratively.     Ngozi Urban MD

## 2023-02-02 ENCOUNTER — DOCUMENTATION ONLY (OUTPATIENT)
Dept: NEUROLOGY | Age: 83
End: 2023-02-02

## 2023-02-02 NOTE — PROGRESS NOTES
Received refill request for Furosemide from [facility] pharmacy. Faxed note back stating please send to PCP office or cardiology for further refills. Faxed to [phone] and received fax confirmation.

## 2023-02-28 ENCOUNTER — HOSPITAL ENCOUNTER (OUTPATIENT)
Dept: CT IMAGING | Age: 83
Discharge: HOME | End: 2023-02-28
Attending: INTERNAL MEDICINE
Payer: MEDICARE

## 2023-02-28 DIAGNOSIS — C34.12 MALIGNANT NEOPLASM OF UPPER LOBE BRONCHUS, LEFT (HCC): ICD-10-CM

## 2023-02-28 PROCEDURE — 71250 CT THORAX DX C-: CPT

## 2023-03-01 NOTE — PERIOP NOTES
[facility]  Ambulatory Surgery Unit  Pre-operative Instructions    Surgery/Procedure Date  3/14/2023            Tentative Arrival Time TBD      1. On the day of your surgery/procedure, please report to the Ambulatory Surgery Unit Registration Desk and sign in at your designated time. The Ambulatory Surgery Unit is located in [facility] on the [facility] side of the [facility] across from the [address] [place] [address]. Please have all of your health insurance cards, co-payment, and a photo ID.    **TWO adults may accompany you the day of the procedure. We have limited seating available. If our waiting room is at capacity, your ride may be asked to remain in their vehicle. No one under 15 is allowed in the waiting room. 2. You must have someone with you to drive you home, as you should not drive a car for 24 hours following anesthesia. Please make arrangements for a responsible adult friend or family member to stay with you for at least the first 24 hours after your surgery. 3. Do not have anything to eat or drink (including water, gum, mints, coffee, juice) after 11:59 PM  3/13/2023. This may not apply to medications prescribed by your physician. (Please note below the special instructions with medications to take the morning of surgery, if applicable.)    4. We recommend you do not drink any alcoholic beverages for 24 hours before and after your surgery. 5. Contact your surgeons office for instructions on the following medications: non-steroidal anti-inflammatory drugs (i.e. Advil, Aleve), vitamins, and supplements. (Some surgeons will want you to stop these medications prior to surgery and others may allow you to take them)   **If you are currently taking Plavix, Coumadin, Aspirin and/or other blood-thinning agents, contact your surgeon for instructions. ** Your surgeon will partner with the physician prescribing these medications to determine if it is safe to stop or if you need to continue taking. Please do not stop taking these medications without instructions from your surgeon. 6. In an effort to help prevent surgical site infection, we ask that you shower with an anti-bacterial soap (i.e. Dial/Safeguard, or the soap provided to you at your preadmission testing appointment) for 3 days prior to and on the morning of surgery, using a fresh towel after each shower. (Please begin this process with fresh bed linens.) Do not apply any lotions, powders, or deodorants after the shower on the day of your procedure. If applicable, please do not shave the operative site for 48 hours prior to surgery. 7. Wear comfortable clothes. Wear glasses instead of contacts. Do not bring any jewelry or money (other than copays or fees as instructed). Do not wear make-up, particularly mascara, the morning of your surgery. Do not wear nail polish, particularly if you are having foot /hand surgery. Wear your hair loose or down, no ponytails, buns, jer pins or clips. All body piercings must be removed. 8. You should understand that if you do not follow these instructions your surgery may be cancelled. If your physical condition changes (i.e. fever, cold or flu) please contact your surgeon as soon as possible. 9. It is important that you be on time. If a situation occurs where you may be late, or if you have any questions or problems, please call [phone].    10. Your surgery time may be subject to change. You will receive a phone call the day prior to surgery to confirm your arrival time. 11. Pediatric patients: please bring a change of clothes, diapers, bottle/sippy cup, pacifier, etc.      Special Instructions: Take all medications and inhalers, as prescribed, on the morning of surgery with a sip of water . Bring inhalers    Insulin Dependent Diabetic patients: Take your diabetic medications as prescribed the day before surgery. Hold all diabetic medications the day of surgery.     If you are scheduled to arrive for surgery after 8:00 AM, and your AM blood sugar is >200, please call Ambulatory Surgery. I understand a pre-operative phone call will be made to verify my surgery time. In the event that I am not available, I give permission for a message to be left on my answering service and/or with another person?       Yes      Reviewed instructions with patient, able to verbalized understanding.         ___________________      ___________________      ________________  (Signature of Patient)          (Witness)                   (Date and Time)

## 2023-03-13 ENCOUNTER — ANESTHESIA EVENT (OUTPATIENT)
Dept: SURGERY | Age: 83
End: 2023-03-13
Payer: MEDICARE

## 2023-03-14 ENCOUNTER — ANESTHESIA (OUTPATIENT)
Dept: SURGERY | Age: 83
End: 2023-03-14
Payer: MEDICARE

## 2023-03-14 ENCOUNTER — HOSPITAL ENCOUNTER (OUTPATIENT)
Age: 83
Setting detail: OUTPATIENT SURGERY
Discharge: HOME | End: 2023-03-14
Attending: OPHTHALMOLOGY | Admitting: OPHTHALMOLOGY
Payer: MEDICARE

## 2023-03-14 VITALS
BODY MASS INDEX: 41.16 KG/M2 | WEIGHT: 218 LBS | OXYGEN SATURATION: 96 % | HEIGHT: 61 IN | DIASTOLIC BLOOD PRESSURE: 72 MMHG | TEMPERATURE: 97.5 F | HEART RATE: 60 BPM | SYSTOLIC BLOOD PRESSURE: 143 MMHG | RESPIRATION RATE: 17 BRPM

## 2023-03-14 PROBLEM — H25.812 COMBINED FORM OF AGE-RELATED CATARACT, LEFT EYE: Status: ACTIVE | Noted: 2023-03-14

## 2023-03-14 PROCEDURE — 74011000250 HC RX REV CODE- 250

## 2023-03-14 PROCEDURE — 76210000046 HC AMBSU PH II REC FIRST 0.5 HR: Performed by: OPHTHALMOLOGY

## 2023-03-14 PROCEDURE — 74011250636 HC RX REV CODE- 250/636: Performed by: NURSE ANESTHETIST, CERTIFIED REGISTERED

## 2023-03-14 PROCEDURE — 74011000250 HC RX REV CODE- 250: Performed by: ANESTHESIOLOGY

## 2023-03-14 PROCEDURE — 76030000002 HC AMB SURG OR TIME FIRST 0.: Performed by: OPHTHALMOLOGY

## 2023-03-14 PROCEDURE — 2709999900 HC NON-CHARGEABLE SUPPLY: Performed by: OPHTHALMOLOGY

## 2023-03-14 PROCEDURE — 76210000040 HC AMBSU PH I REC FIRST 0.5 HR: Performed by: OPHTHALMOLOGY

## 2023-03-14 PROCEDURE — 74011250636 HC RX REV CODE- 250/636: Performed by: OPHTHALMOLOGY

## 2023-03-14 PROCEDURE — 77030018873: Performed by: OPHTHALMOLOGY

## 2023-03-14 PROCEDURE — V2632 POST CHMBR INTRAOCULAR LENS: HCPCS | Performed by: OPHTHALMOLOGY

## 2023-03-14 PROCEDURE — 76060000073 HC AMB SURG ANES FIRST 0.5 HR: Performed by: OPHTHALMOLOGY

## 2023-03-14 PROCEDURE — 74011000250 HC RX REV CODE- 250: Performed by: OPHTHALMOLOGY

## 2023-03-14 DEVICE — IMPLANTABLE DEVICE: Type: IMPLANTABLE DEVICE | Site: EYE | Status: FUNCTIONAL

## 2023-03-14 RX ORDER — FENTANYL CITRATE 50 UG/ML
25 INJECTION, SOLUTION INTRAMUSCULAR; INTRAVENOUS
Status: DISCONTINUED | OUTPATIENT
Start: 2023-03-14 | End: 2023-03-14 | Stop reason: HOSPADM

## 2023-03-14 RX ORDER — PREDNISOLONE ACETATE 10 MG/ML
SUSPENSION/ DROPS OPHTHALMIC AS NEEDED
Status: DISCONTINUED | OUTPATIENT
Start: 2023-03-14 | End: 2023-03-14 | Stop reason: HOSPADM

## 2023-03-14 RX ORDER — CYCLOPENTOLATE HYDROCHLORIDE 20 MG/ML
1 SOLUTION/ DROPS OPHTHALMIC
Status: DISCONTINUED | OUTPATIENT
Start: 2023-03-14 | End: 2023-03-14 | Stop reason: HOSPADM

## 2023-03-14 RX ORDER — LIDOCAINE HYDROCHLORIDE 40 MG/ML
INJECTION, SOLUTION RETROBULBAR; TOPICAL AS NEEDED
Status: DISCONTINUED | OUTPATIENT
Start: 2023-03-14 | End: 2023-03-14 | Stop reason: HOSPADM

## 2023-03-14 RX ORDER — NEOMYCIN SULFATE, POLYMYXIN B SULFATE AND DEXAMETHASONE 3.5; 10000; 1 MG/ML; [USP'U]/ML; MG/ML
SUSPENSION/ DROPS OPHTHALMIC AS NEEDED
Status: DISCONTINUED | OUTPATIENT
Start: 2023-03-14 | End: 2023-03-14 | Stop reason: HOSPADM

## 2023-03-14 RX ORDER — TROPICAMIDE 10 MG/ML
SOLUTION/ DROPS OPHTHALMIC
Status: COMPLETED
Start: 2023-03-14 | End: 2023-03-14

## 2023-03-14 RX ORDER — LIDOCAINE HYDROCHLORIDE 10 MG/ML
0.1 INJECTION, SOLUTION EPIDURAL; INFILTRATION; INTRACAUDAL; PERINEURAL AS NEEDED
Status: DISCONTINUED | OUTPATIENT
Start: 2023-03-14 | End: 2023-03-14 | Stop reason: HOSPADM

## 2023-03-14 RX ORDER — ONDANSETRON 2 MG/ML
4 INJECTION INTRAMUSCULAR; INTRAVENOUS AS NEEDED
Status: DISCONTINUED | OUTPATIENT
Start: 2023-03-14 | End: 2023-03-14 | Stop reason: HOSPADM

## 2023-03-14 RX ORDER — SODIUM CHLORIDE, SODIUM LACTATE, POTASSIUM CHLORIDE, CALCIUM CHLORIDE 600; 310; 30; 20 MG/100ML; MG/100ML; MG/100ML; MG/100ML
25 INJECTION, SOLUTION INTRAVENOUS CONTINUOUS
Status: DISCONTINUED | OUTPATIENT
Start: 2023-03-14 | End: 2023-03-14 | Stop reason: HOSPADM

## 2023-03-14 RX ORDER — SODIUM CHLORIDE 0.9 % (FLUSH) 0.9 %
5-40 SYRINGE (ML) INJECTION AS NEEDED
Status: DISCONTINUED | OUTPATIENT
Start: 2023-03-14 | End: 2023-03-14 | Stop reason: HOSPADM

## 2023-03-14 RX ORDER — SODIUM CHLORIDE 0.9 % (FLUSH) 0.9 %
5-40 SYRINGE (ML) INJECTION EVERY 8 HOURS
Status: DISCONTINUED | OUTPATIENT
Start: 2023-03-14 | End: 2023-03-14 | Stop reason: HOSPADM

## 2023-03-14 RX ORDER — TROPICAMIDE 10 MG/ML
1 SOLUTION/ DROPS OPHTHALMIC
Status: DISCONTINUED | OUTPATIENT
Start: 2023-03-14 | End: 2023-03-14 | Stop reason: HOSPADM

## 2023-03-14 RX ORDER — PHENYLEPHRINE HYDROCHLORIDE 25 MG/ML
1 SOLUTION/ DROPS OPHTHALMIC
Status: DISCONTINUED | OUTPATIENT
Start: 2023-03-14 | End: 2023-03-14 | Stop reason: HOSPADM

## 2023-03-14 RX ORDER — PROPARACAINE HYDROCHLORIDE 5 MG/ML
1 SOLUTION/ DROPS OPHTHALMIC
Status: COMPLETED | OUTPATIENT
Start: 2023-03-14 | End: 2023-03-14

## 2023-03-14 RX ORDER — MIDAZOLAM HYDROCHLORIDE 1 MG/ML
INJECTION, SOLUTION INTRAMUSCULAR; INTRAVENOUS AS NEEDED
Status: DISCONTINUED | OUTPATIENT
Start: 2023-03-14 | End: 2023-03-14 | Stop reason: HOSPADM

## 2023-03-14 RX ORDER — SODIUM CHLORIDE 9 MG/ML
25 INJECTION, SOLUTION INTRAVENOUS CONTINUOUS
Status: DISCONTINUED | OUTPATIENT
Start: 2023-03-14 | End: 2023-03-14 | Stop reason: HOSPADM

## 2023-03-14 RX ORDER — OXYCODONE AND ACETAMINOPHEN 5; 325 MG/1; MG/1
1 TABLET ORAL
Status: DISCONTINUED | OUTPATIENT
Start: 2023-03-14 | End: 2023-03-14

## 2023-03-14 RX ORDER — DIPHENHYDRAMINE HYDROCHLORIDE 50 MG/ML
12.5 INJECTION, SOLUTION INTRAMUSCULAR; INTRAVENOUS AS NEEDED
Status: DISCONTINUED | OUTPATIENT
Start: 2023-03-14 | End: 2023-03-14 | Stop reason: HOSPADM

## 2023-03-14 RX ADMIN — TROPICAMIDE 1 DROP: 10 SOLUTION/ DROPS OPHTHALMIC at 10:14

## 2023-03-14 RX ADMIN — MIDAZOLAM HYDROCHLORIDE 0.5 MG: 1 INJECTION, SOLUTION INTRAMUSCULAR; INTRAVENOUS at 10:40

## 2023-03-14 RX ADMIN — PHENYLEPHRINE HYDROCHLORIDE 1 DROP: 25 SOLUTION/ DROPS OPHTHALMIC at 10:14

## 2023-03-14 RX ADMIN — CYCLOPENTOLATE HYDROCHLORIDE 1 DROP: 20 SOLUTION/ DROPS OPHTHALMIC at 10:26

## 2023-03-14 RX ADMIN — PROPARACAINE HYDROCHLORIDE 1 DROP: 5 SOLUTION/ DROPS OPHTHALMIC at 10:26

## 2023-03-14 RX ADMIN — CYCLOPENTOLATE HYDROCHLORIDE 1 DROP: 20 SOLUTION/ DROPS OPHTHALMIC at 10:11

## 2023-03-14 RX ADMIN — TROPICAMIDE 1 DROP: 10 SOLUTION/ DROPS OPHTHALMIC at 10:11

## 2023-03-14 RX ADMIN — MIDAZOLAM HYDROCHLORIDE 0.5 MG: 1 INJECTION, SOLUTION INTRAMUSCULAR; INTRAVENOUS at 10:29

## 2023-03-14 RX ADMIN — MIDAZOLAM HYDROCHLORIDE 0.5 MG: 1 INJECTION, SOLUTION INTRAMUSCULAR; INTRAVENOUS at 10:38

## 2023-03-14 RX ADMIN — SODIUM CHLORIDE 25 ML/HR: 9 INJECTION, SOLUTION INTRAVENOUS at 10:08

## 2023-03-14 RX ADMIN — TROPICAMIDE 1 DROP: 10 SOLUTION/ DROPS OPHTHALMIC at 10:26

## 2023-03-14 RX ADMIN — PHENYLEPHRINE HYDROCHLORIDE 1 DROP: 25 SOLUTION/ DROPS OPHTHALMIC at 10:26

## 2023-03-14 RX ADMIN — PHENYLEPHRINE HYDROCHLORIDE 1 DROP: 25 SOLUTION/ DROPS OPHTHALMIC at 10:11

## 2023-03-14 RX ADMIN — PROPARACAINE HYDROCHLORIDE 1 DROP: 5 SOLUTION/ DROPS OPHTHALMIC at 10:11

## 2023-03-14 RX ADMIN — MIDAZOLAM HYDROCHLORIDE 0.5 MG: 1 INJECTION, SOLUTION INTRAMUSCULAR; INTRAVENOUS at 10:34

## 2023-03-14 RX ADMIN — CYCLOPENTOLATE HYDROCHLORIDE 1 DROP: 20 SOLUTION/ DROPS OPHTHALMIC at 10:14

## 2023-03-14 NOTE — ANESTHESIA POSTPROCEDURE EVALUATION
Procedure(s):  LEFT EYE PHACOEMULSIFICATION WITH INTRAOCULAR LENS IMPLANTATION. MAC    Anesthesia Post Evaluation      Multimodal analgesia: multimodal analgesia used between 6 hours prior to anesthesia start to PACU discharge  Patient location during evaluation: PACU  Patient participation: complete - patient participated  Level of consciousness: awake and alert  Pain score: 0  Airway patency: patent  Anesthetic complications: no  Cardiovascular status: acceptable  Respiratory status: acceptable  Hydration status: acceptable  Post anesthesia nausea and vomiting:  none  Final Post Anesthesia Temperature Assessment:  Normothermia (36.0-37.5 degrees C)      INITIAL Post-op Vital signs:   Vitals Value Taken Time   /69 03/14/23 1111   Temp 36.6 °C (97.9 °F) 03/14/23 1102   Pulse 63 03/14/23 1114   Resp 16 03/14/23 1114   SpO2 97 % 03/14/23 1114   Vitals shown include unvalidated device data.

## 2023-03-14 NOTE — OP NOTES
Operative Note    Patient: Yumi Rutherford  YOB: 1940  MRN: [id number]    Date of Procedure: 3/14/2023     Preop Diagnosis: Visually impairing nuclear cataract Left eye. Postop Diagnosis: same    Procedure/Surgery: LEFT EYE PHACOEMULSIFICATION WITH INTRAOCULAR LENS IMPLANTATION    Surgeon(s) and Role:     Connor Chase MD - Primary    Anesthesia: MAC     Blood Loss: None    Complications: None    Speciman Removed: * No specimens in log *     PHACO TIME:   01:08.0 seconds at an average power of 13.9%. CDE 9.93    Implants:   Implant Name Type Inv. Item Serial No.  Lot No. LRB No. Used Action   LENS DIOPTER IOL SNGL 24.0 -- [employer] - [id number]  LENS DIOPTER IOL SNGL 24.0 -- [employer] [id number] [employer]WD NA Left 1 Implanted       Procedure: The patient was consented. The operative eye was confirmed and marked in the preoperative holding area. Dilation was confirmed and the patient was brought into the operating room. The eye was prepped and draped in the typical aseptic fashion. Lid speculum was placed. Paracentesis was made and preservative-free lidocaine was injected into the anterior chamber followed by viscoelastic (Viscoat) to fill the anterior chamber. A triplanar self-sealing temporal clear cornea incision was made with a 2.8 mm keratome temporally. A continuous curvilinear capsulorrhexis was initiated with a cystotome and completed with Utrata forceps. Hydrodissection and hydrodelination were performed with confirmed free rotation of the nucleus. Phacoemulsification was peformed in a stop-and-chop fashion followed by cortical removal with irrigation/aspiration. The iris was floppy and became small during the phacoemulsification. The capsular bag was reinflated with viscoelastic (Healon).   The lens type and power was confirmed and the lens injected into the capsular bag and confirmed by moving the iris out of the way to ensure lens fully in the capsule. Residual viscoelastic was removed and the eye was formed with BSS. Stromal hydration was performed. The wound and paracentesis were watertight at the end of the case. The lens was well centered and the pupil was round at the end of the case. The lid speculum was removed. Postoperative eyedrops of latrice/poly/dex and Pred Forte were instilled in the eye. An eye shield was placed over the eye. The patient tolerated the surgery well and was taken to the postoperative holding area in a stable condition.     Rose Mary Jolley MD    3/14/2023  10:58 AM

## 2023-03-14 NOTE — DISCHARGE INSTRUCTIONS
CATARACT POST-OP INSTRUCTIONS    DAY OF SURGERY:  Remove patch  2 hours after you get home & start drops      1. Antibiotic - TAN TOP (Ofloxacin)                  1 drop in operated eye every 2 hours while awake                    2. Steroid - PINK OR WHITE TOP (Prednisolone acetate)                  SHAKE WELL BEFORE USING (10-15X)                  1 drop in operated eye every 2 hours while awake.                                                                        ________________________________________________________________________    STARTING ON THE DAY AFTER SURGERY:     1. Antibiotic - TAN TOP (Ofloxacin)                 1 drop in operated eye 4x per day for 1 week then STOP                   2. Steroid - PINK OR WHITE TOP (Prednisolone acetate)                  SHAKE WELL BEFORE USING (10-15X)                  1 drop in operated eye every 2 hours while awake x  1  week, then                  1 drop in operated eye 4x per day x  1  week then,                  1 drop in operated eye 3x per day x  1  week then,                  1 drop in operated eye 2x per day x  1  week then,                  1 drop in operated eye 1x per day x  1  week then,                                                              SPACE OUT ALL EYE DROPS BY 5 MINUTES SO YOU DON'T 8 Alejoe Jomar Fuentesidi OUT                ONE MEDICATION WITH THE OTHER ONE.      EYE CARE:    1.  DO NOT RUB YOUR EYE!  2.  Wear eye shield when sleeping for 1st week after surgery  3. No heavy lifting (over 15 pounds) or bending below the waist or straining (hard coughing/sneezing/bowel movements - take stool softener if needed)  for 5 days after surgery. 4.  Do not get dirty water in your surgery eye. You can shower if you cover and close your surgery eye.  5.  No makeup for 5 days after surgery on or around the surgery eye. No gardening or swimming for 3 weeks. Wear safety glasses whenever dirt may get in your eyes.   6.  Use all other eye medications you were on before surgery unless instructed by your eye doctor not to do so.  7.  Wash your hands before touching your eye or your eye drops. Do not contaminate the tips of the bottles. 8.  Call immediately for severe pain, worse redness, decreased vision, new flashing lights or floaters. A scratchy sensation is normal and should get better as your eye heals. 9.  Please call with any questions about your postop care. You can reach Dr. Leo Nieto at phone number:  [phone] (office) or [phone] (cell) in an emergency. DO NOT TAKE SLEEPING MEDICATIONS OR ANTIANXIETY MEDICATIONS WHILE TAKING NARCOTIC PAIN MEDICATIONS,  ESPECIALLY THE NIGHT OF ANESTHESIA. CPAP PATIENTS BE SURE TO WEAR MACHINE WHENEVER NAPPING OR SLEEPING. DISCHARGE SUMMARY from Nurse    The following personal items collected during your admission are returned to you:   Dental Appliance: Dental Appliances: Lowers, Uppers, With patient  Vision: Visual Aid: None  Hearing Aid:    Jewelry: Jewelry: None  Clothing: Clothing: With patient  Other Valuables: Other Valuables: None  Valuables sent to safe:        PATIENT INSTRUCTIONS:    After general anesthesia or intravenous sedation, for 24 hours or while taking prescription Narcotics:  Someone should be with you for the next 24 hours. For your own safety, a responsible adult must drive you home. Limit your activities  Recommended activity: Rest today, up with assistance today. Do not climb stairs or shower unattended for the next 24 hours. Please start with a soft bland diet and advance as tolerated (no nausea) to regular diet. Do not drive and operate hazardous machinery  Do not make important personal or business decisions  Do  not drink alcoholic beverages  If you have not urinated within 8 hours after discharge, please contact your surgeon on call.     Report the following to your surgeon:  Excessive pain, swelling, redness or odor of or around the surgical area  Temperature over 100.5  Nausea and vomiting lasting longer than 4 hours or if unable to take medications    You will receive a Post Operative Call from one of the Recovery Room Nurses on the day after your surgery to check on you. It is very important for us to know how you are recovering after your surgery. If you have an issue or need to speak with someone, please call your surgeon, do not wait for the post operative call. You may receive an e-mail or letter in the mail from [employer] regarding your experience with us in the Ambulatory Surgery Unit. Your feedback is valuable to us and we appreciate your participation in the survey. .  If the above instructions are not adequate or you are having problems after your surgery, call the physician at his office number. We wish youre a speedy recovery ? What to do at Home:      *  Please give a list of your current medications to your Primary Care Provider. *  Please update this list whenever your medications are discontinued, doses are      changed, or new medications (including over-the-counter products) are added. *  Please carry medication information at all times in case of emergency situations. If you have not received your influenza and/or pneumococcal vaccine, please follow up with your primary care physician. The discharge information has been reviewed with the patient and caregiver. The patient and caregiver verbalized understanding. TO PREVENT AN INFECTION      [address] YOUR HANDS    To prevent infection, good handwashing is the most important thing you or your caregiver can do. Wash your hands with soap and water or use the hand  we gave you before you touch any wounds. USE CLEAN SHEETS    Use freshly cleaned sheets on your bed after surgery. To keep the surgery site clean, do not allow pets to sleep with you while your wound is still healing.      STOP SMOKING    Stop smoking, or at least cut back on smoking    Smoking slows your healing. CONTROL YOUR BLOOD SUGAR    High blood sugars slow wound healing. If you are diabetic, control your blood sugar levels before and after your surgery.

## 2023-03-14 NOTE — PERIOP NOTES
[facility]  1940  [id number]    Situation:  Verbal report given from: Jena Miller CRNA, Landon Redmond RN  Procedure: Procedure(s):  LEFT EYE PHACOEMULSIFICATION WITH INTRAOCULAR LENS IMPLANTATION    Background:    Preoperative diagnosis: CATARACT    Postoperative diagnosis: CATARACT    :  Dr. Aj Porter    Assistant(s): Circ-1: Maikel Bennett RN  Scrub Tech-1: Js CARR, RT    Specimens: * No specimens in log *    Assessment:  Intra-procedure medications         Anesthesia gave intra-procedure sedation and medications, see anesthesia flow sheet     Intravenous fluids: LR@ KVO     Vital signs stable       Recommendation:    Permission to share finding with  : yes

## 2023-03-14 NOTE — ANESTHESIA PREPROCEDURE EVALUATION
Relevant Problems   ENDOCRINE   (+) Obesity, morbid (HCC)       Anesthetic History   No history of anesthetic complications            Review of Systems / Medical History  Patient summary reviewed, nursing notes reviewed and pertinent labs reviewed    Pulmonary    COPD: severe            Comments: Former smoker  H/o Lung Cancer  Uses O2 2L/min nasal can at HS  Stable pulmonary symptoms   Neuro/Psych         TIA ( multiple) and psychiatric history    Comments: Anxiety d/o  Depression  Vertigo  Migraines  H/o cerebral artery occlusion Cardiovascular    Hypertension              Exercise tolerance: >4 METS     GI/Hepatic/Renal     GERD (not active)           Endo/Other      Hypothyroidism: well controlled  Morbid obesity, arthritis and cancer (Lung, 11/21)     Other Findings   Comments: Cataracts    Lupus  Rheumatoid arthritis         Physical Exam    Airway  Mallampati: II  TM Distance: 4 - 6 cm  Neck ROM: normal range of motion   Mouth opening: Normal     Cardiovascular    Rhythm: regular  Rate: normal         Dental    Dentition: Full upper dentures and Full lower dentures     Pulmonary  Breath sounds clear to auscultation               Abdominal  GI exam deferred       Other Findings            Anesthetic Plan    ASA: 3  Anesthesia type: MAC          Induction: Intravenous  Anesthetic plan and risks discussed with: Patient

## 2023-03-14 NOTE — PERIOP NOTES
Pt. States ready for discharge. Vss. Denies pain. Eye shield to left eye intact. Discharge instructions reviewed w/pt. And daughter. They verbalized understanding. Pt discharged via wheelchair to car, accompanied by RN. Pt discharged awake and alert, respirations equal and unlabored, skin warm, dry, and intact. Pt and family members' questions and concerns addressed prior to discharge.

## 2023-04-26 ENCOUNTER — OFFICE VISIT (OUTPATIENT)
Dept: NEUROLOGY | Age: 83
End: 2023-04-26
Payer: MEDICARE

## 2023-04-26 VITALS
WEIGHT: 219.4 LBS | HEART RATE: 78 BPM | BODY MASS INDEX: 41.42 KG/M2 | SYSTOLIC BLOOD PRESSURE: 140 MMHG | DIASTOLIC BLOOD PRESSURE: 80 MMHG | TEMPERATURE: 97.6 F | RESPIRATION RATE: 16 BRPM | HEIGHT: 61 IN | OXYGEN SATURATION: 94 %

## 2023-04-26 DIAGNOSIS — G43.001 MIGRAINE WITHOUT AURA AND WITH STATUS MIGRAINOSUS, NOT INTRACTABLE: Primary | ICD-10-CM

## 2023-04-26 PROCEDURE — 1101F PT FALLS ASSESS-DOCD LE1/YR: CPT | Performed by: INTERNAL MEDICINE

## 2023-04-26 PROCEDURE — 1123F ACP DISCUSS/DSCN MKR DOCD: CPT | Performed by: INTERNAL MEDICINE

## 2023-04-26 PROCEDURE — G8400 PT W/DXA NO RESULTS DOC: HCPCS | Performed by: INTERNAL MEDICINE

## 2023-04-26 PROCEDURE — G8417 CALC BMI ABV UP PARAM F/U: HCPCS | Performed by: INTERNAL MEDICINE

## 2023-04-26 PROCEDURE — G8428 CUR MEDS NOT DOCUMENT: HCPCS | Performed by: INTERNAL MEDICINE

## 2023-04-26 PROCEDURE — G8510 SCR DEP NEG, NO PLAN REQD: HCPCS | Performed by: INTERNAL MEDICINE

## 2023-04-26 PROCEDURE — G8536 NO DOC ELDER MAL SCRN: HCPCS | Performed by: INTERNAL MEDICINE

## 2023-04-26 PROCEDURE — 1090F PRES/ABSN URINE INCON ASSESS: CPT | Performed by: INTERNAL MEDICINE

## 2023-04-26 PROCEDURE — 99215 OFFICE O/P EST HI 40 MIN: CPT | Performed by: INTERNAL MEDICINE

## 2023-04-26 RX ORDER — ALBUTEROL SULFATE 90 UG/1
AEROSOL, METERED RESPIRATORY (INHALATION)
COMMUNITY
Start: 2023-01-23

## 2023-04-26 RX ORDER — ASPIRIN 81 MG/1
81 TABLET ORAL DAILY
COMMUNITY

## 2023-04-26 NOTE — PROGRESS NOTES
Chief Complaint   Patient presents with    Follow-up    Migraine     Former patient Dr Carrillo Palacios     1. Have you been to the ER, urgent care clinic since your last visit? Hospitalized since your last visit? 2. Have you seen or consulted any other health care providers outside of the [address] [place] [address] since your last visit? Yes pulmonary , PCP , rheumatologist Include any pap smears or colon screening. Yes colonoscopy    Patient C/O stress and questions if need some of medications she is taking.

## 2023-04-26 NOTE — PROGRESS NOTES
Neurology Note    Patient ID:  Page Castro  [id number]  29 y.o.  1940      Date of Consultation:  April 26, 2023      Assessment and Plan:  82-year-old female presenting with history of neuropathy and TGN and chronic migraine headaches. Patient seems to be stable without any worsening of symptoms. Has no new neurological symptoms today. Neurological exam is nonfocal.  Reviewed her medications with her today. Recommendations:  - No new recommendations  - Continue verapamil 120 ER capsule for migraine headache  - Continue carbamazepine 100 mg sustained-release twice daily for TGN  - Continue aspirin 81 mg daily for secondary stroke prevention. Problem was discussed at length with the patient. We reviewed the results of the study in detail. We also discussed prognosis and treatment options. The patient had opportunity today to ask all questions, expressed understanding of the instructions provided, and agreed with the plan of treatment. Follow up in 6-8 months. I spent 60 minutes providing care to this patient, reviewing the patient's chart, notes, labs, medications and preparing documentation along with >50% of the time spent counseling patient during the encounter. History of Present Illness: Page Castro is a 82 y.o. female with history of anxiety, depression, hypertension, history of lupus, chronic migraine headaches, rheumatoid arthritis, hypothyroidism presenting as a follow-up. The patient was a former patient of Dr. Jerzy Hilton. She states that she has been doing well since she last saw her prior neurologist.  She has no new issues other than some edema in her legs. Left seems to be worse than right. She has been seeing a rheumatologist for her lupus and rheumatoid arthritis and is getting infusions for this. No new neurological symptoms today. She does feel that the Tegretol has helped with her trigeminal neuralgia and postherpetic nerve pain.     Past Medical History:   Diagnosis Date    Anxiety disorder     Cancer ([facility] 2021    lung    Cerebral artery occlusion with cerebral infarction [facility])     no deficits    COPD (chronic obstructive pulmonary disease) (HCC)     Depression     GERD (gastroesophageal reflux disease)     Hypertension     Lupus ([facility]     as of 3/2/2023 last flare up was 2022    Migraine     Nocturnal hypoxemia     Oxygen dependent     Oxygen 2 liters per nasal cannula at night    Rheumatoid arteritis ([facility]     Syncope     Thyroid disease     hypothyroid    Vertigo         Past Surgical History:   Procedure Laterality Date    HX CATARACT REMOVAL Right 2022    HX COLONOSCOPY      HX ENDOSCOPY      HX OTHER SURGICAL  2021    cancer removed from left lung    HX OTHER SURGICAL  2021    s/p NOLVIA Wedge Resection (h/o- NSCLC)        Family History   Problem Relation Age of Onset    No Known Problems Mother     No Known Problems Father     Cancer Sister         breast    Cancer Brother         lung    Cancer Sister         breast    Cancer Sister         breast    Cancer Brother         skin        Social History     Tobacco Use    Smoking status: Former     Types: Cigarettes     Quit date:      Years since quittin.3    Smokeless tobacco: Never    Tobacco comments:     A puff or two every now and then / none since 50 years   Substance Use Topics    Alcohol use: No        Allergies   Allergen Reactions    Codeine Swelling    Erythromycin Itching     And swelling    Lovastatin Swelling    Minocin [Minocycline] Swelling    Naproxen Drowsiness    Neurontin [Gabapentin] Swelling    Pcn [Penicillins] Swelling    Sulfa (Sulfonamide Antibiotics) Itching    Tramadol Drowsiness        Prior to Admission medications    Medication Sig Start Date End Date Taking?  Authorizing Provider   albuterol (PROVENTIL HFA, VENTOLIN HFA, PROAIR HFA) 90 mcg/actuation inhaler INHALE 2 PUFFS BY MOUTH EVERY 4 HOURS AS NEEDED FOR SHORTNESS OF BREATH, COUGH OR WHEEZING 1/23/23  Yes Provider, Historical   aspirin delayed-release 81 mg tablet Take 1 Tablet by mouth daily. Yes Provider, Historical   potassium chloride SR (KLOR-CON 10) 10 mEq tablet TAKE 1 TABLET BY MOUTH EVERY DAY 4/7/23  Yes Rufus, [facility], [address] [phone] mg-mg-million tab Take 1 Capsule by mouth Every morning. Yes Provider, Historical   carBAMazepine XR (TEGretol XR) 100 mg SR tablet Take 1 Tablet by mouth two (2) times a day. 10/19/22  Yes Gavin Terry MD   furosemide (LASIX) 20 mg tablet TAKE 1 TABLET BY MOUTH EVERY DAY  Patient taking differently: 2 Tablets. 10/17/22  Yes Gavin Terry MD   verapamil ER (VERELAN) 120 mg ER capsule TAKE 1 CAPSULE BY MOUTH EVERY DAY 8/25/22  Yes Gavin Terry MD   diazePAM (VALIUM) 10 mg tablet TAKE 1 TABLET BY MOUTH EVERY TWELVE (12) HOURS. Patient taking differently: Take 1 Tablet by mouth as needed. 8/24/22  Yes Gavin Terry MD   losartan (COZAAR) 50 mg tablet Take 1 Tablet by mouth daily. 5/21/22  Yes Provider, Historical   albuterol (PROVENTIL VENTOLIN) 2.5 mg /3 mL (0.083 %) nebu albuterol sulfate 2.5 mg/3 mL (0.083 %) solution for nebulization   Yes Provider, Historical   escitalopram oxalate (LEXAPRO) 10 mg tablet Take 1 Tablet by mouth daily. Patient taking differently: Take 2 Tablets by mouth daily. 9/23/21  Yes Thomas Rivera MD   tiotropium-olodateroL (STIOLTO RESPIMAT) 2.5-2.5 mcg/actuation inhaler 2 Puffs daily. Yes Provider, Historical   levothyroxine (SYNTHROID) 75 mcg tablet Take 1 Tablet by mouth Daily (before breakfast). 1/18/17  Yes Provider, Historical   hydroxychloroquine (PLAQUENIL) 200 mg tablet Take 1 Tablet by mouth daily. Yes Provider, Historical   Oxygen nightly.  2 liters   Yes Provider, Historical       Review of Systems:    General, constitutional: negative  Eyes, vision: negative  Ears, nose, throat: negative  Cardiovascular, heart: negative  Respiratory: negative  Gastrointestinal: negative  Genitourinary: negative  Musculoskeletal: negative  Skin and integumentary: negative  Psychiatric: negative  Endocrine: negative  Neurological: negative, except for HPI  Hematologic/lymphatic: negative  Allergy/immunology: negative    []Unable to obtain  ROS due to  []mental status change  []sedated   []intubated    Objective:     Visit Vitals  BP (!) 140/80 (BP 1 Location: Left upper arm, BP Patient Position: Sitting, BP Cuff Size: Large adult)   Pulse 78   Temp 97.6 °F (36.4 °C) (Temporal)   Resp 16   Ht 5' 1\" (1.549 m)   Wt 219 lb 6.4 oz (99.5 kg)   SpO2 94%   BMI 41.46 kg/m²       Physical Exam:  General:  appears well nourished in no acute distress  Neck: no obvious deformity or masses  Lungs: comfortable on room air  Heart:  well-perfused   Lower extremity: edema in the lower extremities L>R   Skin: intact    Neurological exam:  Awake, alert, oriented to person, place and time  Recent and remote memory were normal  Attention and concentration were intact  Language was intact. There was no aphasia  Speech: no dysarthria  Fund of knowledge was preserved    Cranial nerves:   II-XII were tested    PERRRLA  Visual fields were full to finger counting   EOMI, no evidence of nystagmus  Facial sensation:  normal and symmetric  Facial motor: normal and symmetric  Hearing intact  SCM strength intact  Tongue: midline without fasciculations    Motor: Tone normal in upper and lower extremities     Strength testing:   deltoid triceps biceps Wrist ext. Wrist flex. intrinsics Hip flex. Hip ext. Knee ext. Knee flex Dorsi flex Plantar flex   Right 5 5 5 5 5 5 5 NT 5 5 5 5   Left 5 5 5 5 5 5 5 NT 5 5 5 5       Sensory:  Intact to LT throughout     Reflexes:    Right Left  Biceps  2 2  Triceps  2 2  Brachiorad. 2 2  Patella  2 2  Achilles absent bilaterally     Cerebellar testing:  no tremor apparent, finger/nose and rapid alternating movements were intact      Gait: steady.      Labs:     Lab Results   Component Value Date/Time    WBC 9.0 02/13/2019 11:52 AM    HCT 40.8 02/13/2019 11:52 AM    HGB 13.1 02/13/2019 11:52 AM    PLATELET 402 46/14/1372 11:52 AM       Imaging:    [facility]    MRI BRAIN W WO CONT    Narrative  Clinical history: Dizziness and giddiness  INDICATION:   Dizziness and giddiness    COMPARISON: 5/16/2018, 4/19/2017  TECHNIQUE: MR examination of the brain includes axial and sagittal T1 , axial  T2, axial FLAIR, axial gradient echo, axial DWI, coronal T1 . Pre and post  contrast axial T1-weighted imaging. Postcontrast T1-weighted imaging coronal  plane. CONTRAST: The patient was administered gadolinium-based contrast material,  subsequently axial and sagittal T1-weighted postcontrast imaging was obtained. FINDINGS:  There is no intracranial mass, hemorrhage or acute infarction. Confluent periventricular and scattered foci of increased T2 signal intensity  corona radiata consistent  Not significantly changed. Trace fluid in the inferior mastoid air cell on the  left. IACs are symmetric in size. The left vertebral artery slightly larger than  the right. Mild mucoperiosteal thickening in the right and left maxillary  sinuses. . There is no abnormal parenchymal enhancement. There is no abnormal  meningeal enhancement demonstrated. The brain architecture is normal. There is  no evidence of midline shift or mass-effect. The ventricles are normal in size,  position and configuration. There are no extra-axial fluid collections. Major  intracranial vascular flow-voids are unremarkable. The orbits are grossly  symmetric. Dural venous sinuses are grossly unremarkable. There is no Chiari or  syrinx. Pituitary and infundibulum grossly unremarkable. Cerebellopontine angles  are unremarkable. No skull base mass is identified. Cavernous sinuses are  symmetric. The mastoid air cells and are well pneumatized and clear.     Impression  IMPRESSION:  Mild/moderate chronic microvascular ischemic change. Trace paranasal sinus disease. No intracranial mass, hemorrhage or evidence of acute infarction. Results from Hospital Encounter encounter on 02/28/23    CT CHEST WO CONT    Narrative  INDICATION: Ligament neoplasm of upper lobe, left bronchus or lung    COMPARISON: 8/22/2022 and 3/9/2022    CONTRAST: None. TECHNIQUE: 2.5 and 5 mm axial images were obtained through the chest. Coronal  and sagittal reconstructions were generated. The absence of intravenous contrast  reduces the sensitivity for evaluation of the mediastinum and upper abdominal  organs. CT dose reduction was achieved through use of a standardized protocol  tailored for this examination and automatic exposure control for dose  modulation. FINDINGS:    THYROID: No nodule. MEDIASTINUM: No mass or lymphadenopathy. ELEN: No mass or lymphadenopathy. THORACIC AORTA: No aneurysm. MAIN PULMONARY ARTERY: Normal in caliber. TRACHEA/BRONCHI: Patent. ESOPHAGUS: No wall thickening or dilatation. HEART: Normal in size. Coronary artery calcification is absent  PLEURA: No effusion or pneumothorax. LUNGS: The post surgical changes anteriorly in the left upper lobe with  associated mild scarring and volume loss is unchanged. Mild areas of scarring  anteriorly in the left lower lobe are noted. Posteriorly in the right upper lobe along the plane of the major fissure is new  mild peripheral areas of atelectasis. There is also new mild peripheral areas of  atelectasis laterally in the right middle lobe and anterolaterally in the right  lower lobe. Areas of atelectasis medially in the right middle lobe are unchanged. Areas of  atelectasis posteriorly in the right middle lobe are noted and have developed in  the interval.    On axial image 97 posteriorly in right lower lobe is a new 2 mm nodule-like  density.     Anteriorly in the left upper lobe on image 53 is a 3 mm groundglass nodule-like  density that is stable. .  INCIDENTALLY IMAGED UPPER ABDOMEN: There is a cyst in the right kidney for which  further evaluation is not necessary. No adrenal lesions. . Small right-sided  Bochdalek hernia is noted. Even smaller left-sided Bochdalek hernia is noted. BONES: No destructive bone lesion. 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 Impression  1. Stable postoperative changes anteriorly in left upper lobe. 2. Stable 3 mm groundglass nodule anteriorly in left upper lobe. 3. Posteriorly in the right lower lobe on axial image 97 is a new 2 mm nodular  density. Etiology of this is uncertain this can be evaluated on close follow-up. 3. New mild areas of peripheral atelectasis in the right lung as described seen  posteriorly in the right upper lobe, posteriorly in the right middle lobe and  laterally in the right lower lobe.              Patient Active Problem List   Diagnosis Code    Obesity, morbid ([facility] E66.01    Combined form of age-related cataract, right eye H25.811    Combined form of age-related cataract, left eye H25.812                   Signed By:   Umang Rodriguez DO  Neurophysiology      April 26, 2023

## 2023-09-12 RX ORDER — VERAPAMIL HYDROCHLORIDE 120 MG/1
CAPSULE, EXTENDED RELEASE ORAL
Qty: 90 CAPSULE | Refills: 1 | Status: SHIPPED | OUTPATIENT
Start: 2023-09-12

## 2023-11-15 ENCOUNTER — OFFICE VISIT (OUTPATIENT)
Age: 83
End: 2023-11-15
Payer: MEDICARE

## 2023-11-15 VITALS
BODY MASS INDEX: 41.65 KG/M2 | HEIGHT: 61 IN | RESPIRATION RATE: 18 BRPM | OXYGEN SATURATION: 94 % | SYSTOLIC BLOOD PRESSURE: 130 MMHG | DIASTOLIC BLOOD PRESSURE: 82 MMHG | WEIGHT: 220.6 LBS | HEART RATE: 61 BPM

## 2023-11-15 DIAGNOSIS — H53.9 VISUAL DISTURBANCE: ICD-10-CM

## 2023-11-15 DIAGNOSIS — Z86.73 HISTORY OF TIA (TRANSIENT ISCHEMIC ATTACK): Primary | ICD-10-CM

## 2023-11-15 DIAGNOSIS — G43.001 MIGRAINE WITHOUT AURA, NOT INTRACTABLE, WITH STATUS MIGRAINOSUS: ICD-10-CM

## 2023-11-15 DIAGNOSIS — G50.0 TRIGEMINAL NEURALGIA OF LEFT SIDE OF FACE: ICD-10-CM

## 2023-11-15 PROCEDURE — 99214 OFFICE O/P EST MOD 30 MIN: CPT | Performed by: NURSE PRACTITIONER

## 2023-11-15 PROCEDURE — G8400 PT W/DXA NO RESULTS DOC: HCPCS | Performed by: NURSE PRACTITIONER

## 2023-11-15 PROCEDURE — 1090F PRES/ABSN URINE INCON ASSESS: CPT | Performed by: NURSE PRACTITIONER

## 2023-11-15 PROCEDURE — 1036F TOBACCO NON-USER: CPT | Performed by: NURSE PRACTITIONER

## 2023-11-15 PROCEDURE — G8484 FLU IMMUNIZE NO ADMIN: HCPCS | Performed by: NURSE PRACTITIONER

## 2023-11-15 PROCEDURE — 1123F ACP DISCUSS/DSCN MKR DOCD: CPT | Performed by: NURSE PRACTITIONER

## 2023-11-15 PROCEDURE — G8417 CALC BMI ABV UP PARAM F/U: HCPCS | Performed by: NURSE PRACTITIONER

## 2023-11-15 PROCEDURE — G8427 DOCREV CUR MEDS BY ELIG CLIN: HCPCS | Performed by: NURSE PRACTITIONER

## 2023-11-15 RX ORDER — FOLIC ACID/MULTIVIT,IRON,MINER 0.4MG-18MG
1 TABLET ORAL DAILY
COMMUNITY

## 2023-11-15 RX ORDER — AZELASTINE HYDROCHLORIDE 137 UG/1
SPRAY, METERED NASAL
COMMUNITY
Start: 2023-10-16

## 2023-11-15 RX ORDER — AZITHROMYCIN 500 MG/1
500 TABLET, FILM COATED ORAL
COMMUNITY
Start: 2023-11-12

## 2023-11-15 RX ORDER — ALBUTEROL SULFATE 90 UG/1
2 AEROSOL, METERED RESPIRATORY (INHALATION) EVERY 6 HOURS PRN
COMMUNITY

## 2023-11-15 RX ORDER — ATORVASTATIN CALCIUM 10 MG/1
10 TABLET, FILM COATED ORAL DAILY
COMMUNITY

## 2023-11-15 RX ORDER — GLUCOSAMINE HCL 500 MG
1 TABLET ORAL DAILY
COMMUNITY

## 2023-11-15 RX ORDER — ASPIRIN 81 MG/1
81 TABLET ORAL DAILY
COMMUNITY

## 2023-11-15 ASSESSMENT — ENCOUNTER SYMPTOMS
TROUBLE SWALLOWING: 1
EYE ITCHING: 1
PHOTOPHOBIA: 1

## 2023-11-15 ASSESSMENT — PATIENT HEALTH QUESTIONNAIRE - PHQ9
1. LITTLE INTEREST OR PLEASURE IN DOING THINGS: 0
2. FEELING DOWN, DEPRESSED OR HOPELESS: 0
SUM OF ALL RESPONSES TO PHQ QUESTIONS 1-9: 0
SUM OF ALL RESPONSES TO PHQ9 QUESTIONS 1 & 2: 0
SUM OF ALL RESPONSES TO PHQ QUESTIONS 1-9: 0
SUM OF ALL RESPONSES TO PHQ9 QUESTIONS 1 & 2: 0
1. LITTLE INTEREST OR PLEASURE IN DOING THINGS: 0
SUM OF ALL RESPONSES TO PHQ QUESTIONS 1-9: 0
2. FEELING DOWN, DEPRESSED OR HOPELESS: 0
SUM OF ALL RESPONSES TO PHQ QUESTIONS 1-9: 0

## 2023-11-15 NOTE — PROGRESS NOTES
Chief Complaint   Patient presents with    Neurologic Problem     \" Transient arterial occlusion\"\"- hx of TIA's      1. Have you been to the ER, urgent care clinic since your last visit? Hospitalized since your last visit? No     2. Have you seen or consulted any other health care providers outside of the [address] [place] [address] Avenue since your last visit? Include any pap smears or colon screening.   Yes Dr Angeline Weaver PCP
abnormal  meningeal enhancement demonstrated. The brain architecture is normal. There is  no evidence of midline shift or mass-effect. The ventricles are normal in size,  position and configuration. There are no extra-axial fluid collections. Major  intracranial vascular flow-voids are unremarkable. The orbits are grossly  symmetric. Dural venous sinuses are grossly unremarkable. There is no Chiari or  syrinx. Pituitary and infundibulum grossly unremarkable. Cerebellopontine angles  are unremarkable. No skull base mass is identified. Cavernous sinuses are  symmetric. The mastoid air cells and are well pneumatized and clear. Impression  IMPRESSION:  Mild/moderate chronic microvascular ischemic change. Trace paranasal sinus disease. No intracranial mass, hemorrhage or evidence of acute infarction. PHYSICAL EXAMINATION:    Vitals:    11/15/23 1406   BP: 130/82   Site: Left Upper Arm   Position: Sitting   Cuff Size: Large Adult   Pulse: 61   Resp: 18   SpO2: 94%   Weight: 100.1 kg (220 lb 9.6 oz)   Height: 1.549 m (5' 1\")       General:  Well defined, nourished, and well groomed individual in no acute distress. Musculoskeletal: Bilateral lower extremity edema noted and had full range of motion of joints. Psych:  Good mood and bright affect with her sister. NEUROLOGICAL EXAMINATION:     Mental Status:   Alert and oriented to person, place, and time with recent and remote memory intact. Attention span and concentration are normal. Clear speech. Fund of knowledge preserved. Cranial Nerves:   PERRLA. Visual fields were full  EOM: no evidence of nystagmus  Facial sensation:  normal and symmetric  Facial motor: normal and symmetric, no facial droop noted. Hearing intact  SCM strength intact  Tongue: midline without fasciculations    Motor Examination: Normal tone. 5/5 muscle strength in bilateral upper and lower extremities. No cogwheel rigidity.   No muscle wasting, no twitching or fasciculation

## 2024-04-04 ENCOUNTER — HOSPITAL ENCOUNTER (OUTPATIENT)
Facility: HOSPITAL | Age: 84
Discharge: HOME | End: 2024-04-04
Attending: INTERNAL MEDICINE
Payer: MEDICARE

## 2024-04-04 DIAGNOSIS — C34.12 PRIMARY MALIGNANT NEOPLASM OF LEFT UPPER LOBE OF LUNG (HCC): ICD-10-CM

## 2024-04-04 PROCEDURE — 71250 CT THORAX DX C-: CPT

## 2024-05-16 ENCOUNTER — OFFICE VISIT (OUTPATIENT)
Age: 84
End: 2024-05-16
Payer: MEDICARE

## 2024-05-16 VITALS
SYSTOLIC BLOOD PRESSURE: 126 MMHG | HEART RATE: 69 BPM | OXYGEN SATURATION: 97 % | RESPIRATION RATE: 18 BRPM | HEIGHT: 61 IN | DIASTOLIC BLOOD PRESSURE: 72 MMHG | WEIGHT: 220 LBS | BODY MASS INDEX: 41.54 KG/M2

## 2024-05-16 DIAGNOSIS — R42 DIZZINESS AND GIDDINESS: ICD-10-CM

## 2024-05-16 DIAGNOSIS — G50.0 TRIGEMINAL NEURALGIA OF LEFT SIDE OF FACE: ICD-10-CM

## 2024-05-16 DIAGNOSIS — G43.001 MIGRAINE WITHOUT AURA, NOT INTRACTABLE, WITH STATUS MIGRAINOSUS: Primary | ICD-10-CM

## 2024-05-16 DIAGNOSIS — Z86.73 HISTORY OF TIA (TRANSIENT ISCHEMIC ATTACK): ICD-10-CM

## 2024-05-16 DIAGNOSIS — H53.9 VISUAL DISTURBANCE: ICD-10-CM

## 2024-05-16 PROCEDURE — 1123F ACP DISCUSS/DSCN MKR DOCD: CPT | Performed by: NURSE PRACTITIONER

## 2024-05-16 PROCEDURE — G8427 DOCREV CUR MEDS BY ELIG CLIN: HCPCS | Performed by: NURSE PRACTITIONER

## 2024-05-16 PROCEDURE — G8417 CALC BMI ABV UP PARAM F/U: HCPCS | Performed by: NURSE PRACTITIONER

## 2024-05-16 PROCEDURE — 1090F PRES/ABSN URINE INCON ASSESS: CPT | Performed by: NURSE PRACTITIONER

## 2024-05-16 PROCEDURE — 1036F TOBACCO NON-USER: CPT | Performed by: NURSE PRACTITIONER

## 2024-05-16 PROCEDURE — G8400 PT W/DXA NO RESULTS DOC: HCPCS | Performed by: NURSE PRACTITIONER

## 2024-05-16 PROCEDURE — 99214 OFFICE O/P EST MOD 30 MIN: CPT | Performed by: NURSE PRACTITIONER

## 2024-05-16 RX ORDER — PREDNISONE 5 MG/1
5 TABLET ORAL DAILY
COMMUNITY
Start: 2024-04-02

## 2024-05-16 ASSESSMENT — ENCOUNTER SYMPTOMS: SHORTNESS OF BREATH: 1

## 2024-05-16 ASSESSMENT — PATIENT HEALTH QUESTIONNAIRE - PHQ9
SUM OF ALL RESPONSES TO PHQ QUESTIONS 1-9: 0
SUM OF ALL RESPONSES TO PHQ QUESTIONS 1-9: 0
1. LITTLE INTEREST OR PLEASURE IN DOING THINGS: NOT AT ALL
SUM OF ALL RESPONSES TO PHQ QUESTIONS 1-9: 0
SUM OF ALL RESPONSES TO PHQ QUESTIONS 1-9: 0
2. FEELING DOWN, DEPRESSED OR HOPELESS: NOT AT ALL
SUM OF ALL RESPONSES TO PHQ9 QUESTIONS 1 & 2: 0

## 2024-05-16 NOTE — PROGRESS NOTES
lifestyle encouraged, continue close monitoring and follow-up via primary care.  Discussed the importance of maintaining regular exercise, good sleep-wake cycle as well as healthy diet.  2.  Migraine without aura, not intractable with status: Patient notes she is well controlled with current regimen which includes  verapamil 120 mg extended release, she takes this daily.  Patient is to contact us for any worsening signs or symptoms.  3.  Left trigeminal neuralgia: Patient notes she is well controlled with current regimen she is only taking the Tegretol 100 mg 1 in the morning, she will contact us if she has any increased pain.  At that time it would be advised for patient to take the Tegretol twice a day.  4.  Visual disturbance: Patient is to continue close monitoring and follow-up with the ophthalmology.  We will request records from [facility] for further evaluation of diagnostic work-up that was completed July 2023.    REVIEW OF SYSTEMS:     Review of Systems   Eyes:  Positive for visual disturbance.        She has to wear glasses when reading, writing or watching.   Respiratory:  Positive for shortness of breath (sleeps with 2 L).    Musculoskeletal:  Positive for arthralgias.   Neurological:  Positive for headaches. Negative for dizziness, tremors, light-headedness and numbness.   Psychiatric/Behavioral:  Negative for sleep disturbance.    All other systems reviewed and are negative.          Current Outpatient Medications   Medication Sig    predniSONE (DELTASONE) 5 MG tablet Take 1 tablet by mouth daily    verapamil (CALAN SR) 120 MG extended release tablet TAKE 1 TABLET BY MOUTH EVERY DAY    aspirin 81 MG EC tablet Take 1 tablet by mouth daily    Azelastine HCl 137 MCG/SPRAY SOLN USE 1 SPRAY INTO EACH NOSTRIL TWICE A DAY    Omega-3 Fatty Acids (OMEGA-3 FISH OIL) 1200 MG CAPS Take 1 capsule by mouth daily    vitamin D (CHOLECALCIFEROL) 125 MCG (5000 UT) CAPS capsule Take 1 capsule by mouth daily

## 2024-05-26 ENCOUNTER — APPOINTMENT (OUTPATIENT)
Facility: HOSPITAL | Age: 84
End: 2024-05-26
Payer: MEDICARE

## 2024-05-26 ENCOUNTER — HOSPITAL ENCOUNTER (EMERGENCY)
Facility: HOSPITAL | Age: 84
Discharge: HOME | End: 2024-05-26
Attending: STUDENT IN AN ORGANIZED HEALTH CARE EDUCATION/TRAINING PROGRAM
Payer: MEDICARE

## 2024-05-26 VITALS
TEMPERATURE: 98.8 F | SYSTOLIC BLOOD PRESSURE: 158 MMHG | RESPIRATION RATE: 20 BRPM | HEART RATE: 66 BPM | BODY MASS INDEX: 37.76 KG/M2 | WEIGHT: 200 LBS | DIASTOLIC BLOOD PRESSURE: 70 MMHG | OXYGEN SATURATION: 96 % | HEIGHT: 61 IN

## 2024-05-26 DIAGNOSIS — R07.9 RIGHT-SIDED CHEST PAIN: ICD-10-CM

## 2024-05-26 DIAGNOSIS — G43.909 MIGRAINE WITHOUT STATUS MIGRAINOSUS, NOT INTRACTABLE, UNSPECIFIED MIGRAINE TYPE: Primary | ICD-10-CM

## 2024-05-26 LAB
ALBUMIN SERPL-MCNC: 4.2 G/DL (ref 3.5–5)
ALBUMIN/GLOB SERPL: 2.1 (ref 1.1–2.2)
ALP SERPL-CCNC: 56 U/L (ref 45–117)
ALT SERPL-CCNC: 32 U/L (ref 12–78)
ANION GAP SERPL CALC-SCNC: 4 MMOL/L (ref 5–15)
AST SERPL-CCNC: 15 U/L (ref 15–37)
BASOPHILS # BLD: 0.1 K/UL (ref 0–0.1)
BASOPHILS NFR BLD: 2 % (ref 0–1)
BILIRUB SERPL-MCNC: 0.8 MG/DL (ref 0.2–1)
BUN SERPL-MCNC: 13 MG/DL (ref 6–20)
BUN/CREAT SERPL: 13 (ref 12–20)
CALCIUM SERPL-MCNC: 9.2 MG/DL (ref 8.5–10.1)
CHLORIDE SERPL-SCNC: 110 MMOL/L (ref 97–108)
CO2 SERPL-SCNC: 30 MMOL/L (ref 21–32)
CREAT SERPL-MCNC: 1.01 MG/DL (ref 0.55–1.02)
DIFFERENTIAL METHOD BLD: ABNORMAL
EOSINOPHIL # BLD: 0.2 K/UL (ref 0–0.4)
EOSINOPHIL NFR BLD: 5 % (ref 0–7)
ERYTHROCYTE [DISTWIDTH] IN BLOOD BY AUTOMATED COUNT: 13 % (ref 11.5–14.5)
GLOBULIN SER CALC-MCNC: 2 G/DL (ref 2–4)
GLUCOSE SERPL-MCNC: 94 MG/DL (ref 65–100)
HCT VFR BLD AUTO: 38 % (ref 35–47)
HGB BLD-MCNC: 12.1 G/DL (ref 11.5–16)
IMM GRANULOCYTES # BLD AUTO: 0.1 K/UL (ref 0–0.04)
IMM GRANULOCYTES NFR BLD AUTO: 2 % (ref 0–0.5)
LYMPHOCYTES # BLD: 1.1 K/UL (ref 0.8–3.5)
LYMPHOCYTES NFR BLD: 33 % (ref 12–49)
MCH RBC QN AUTO: 33.3 PG (ref 26–34)
MCHC RBC AUTO-ENTMCNC: 31.8 G/DL (ref 30–36.5)
MCV RBC AUTO: 104.7 FL (ref 80–99)
MONOCYTES # BLD: 0.6 K/UL (ref 0–1)
MONOCYTES NFR BLD: 20 % (ref 5–13)
NEUTS SEG # BLD: 1.3 K/UL (ref 1.8–8)
NEUTS SEG NFR BLD: 38 % (ref 32–75)
NRBC # BLD: 0 K/UL (ref 0–0.01)
NRBC BLD-RTO: 0 PER 100 WBC
PLATELET # BLD AUTO: 158 K/UL (ref 150–400)
PMV BLD AUTO: 11.5 FL (ref 8.9–12.9)
POTASSIUM SERPL-SCNC: 3.7 MMOL/L (ref 3.5–5.1)
PROT SERPL-MCNC: 6.2 G/DL (ref 6.4–8.2)
RBC # BLD AUTO: 3.63 M/UL (ref 3.8–5.2)
SODIUM SERPL-SCNC: 144 MMOL/L (ref 136–145)
TROPONIN I SERPL HS-MCNC: 13 NG/L (ref 0–51)
WBC # BLD AUTO: 3.2 K/UL (ref 3.6–11)

## 2024-05-26 PROCEDURE — 96376 TX/PRO/DX INJ SAME DRUG ADON: CPT

## 2024-05-26 PROCEDURE — 6360000004 HC RX CONTRAST MEDICATION: Performed by: RADIOLOGY

## 2024-05-26 PROCEDURE — 96374 THER/PROPH/DIAG INJ IV PUSH: CPT

## 2024-05-26 PROCEDURE — 6370000000 HC RX 637 (ALT 250 FOR IP): Performed by: STUDENT IN AN ORGANIZED HEALTH CARE EDUCATION/TRAINING PROGRAM

## 2024-05-26 PROCEDURE — 6360000002 HC RX W HCPCS: Performed by: STUDENT IN AN ORGANIZED HEALTH CARE EDUCATION/TRAINING PROGRAM

## 2024-05-26 PROCEDURE — 36415 COLL VENOUS BLD VENIPUNCTURE: CPT

## 2024-05-26 PROCEDURE — 84484 ASSAY OF TROPONIN QUANT: CPT

## 2024-05-26 PROCEDURE — 80053 COMPREHEN METABOLIC PANEL: CPT

## 2024-05-26 PROCEDURE — 96375 TX/PRO/DX INJ NEW DRUG ADDON: CPT

## 2024-05-26 PROCEDURE — 71046 X-RAY EXAM CHEST 2 VIEWS: CPT

## 2024-05-26 PROCEDURE — 2580000003 HC RX 258: Performed by: STUDENT IN AN ORGANIZED HEALTH CARE EDUCATION/TRAINING PROGRAM

## 2024-05-26 PROCEDURE — 93005 ELECTROCARDIOGRAM TRACING: CPT | Performed by: STUDENT IN AN ORGANIZED HEALTH CARE EDUCATION/TRAINING PROGRAM

## 2024-05-26 PROCEDURE — 99285 EMERGENCY DEPT VISIT HI MDM: CPT

## 2024-05-26 PROCEDURE — 70450 CT HEAD/BRAIN W/O DYE: CPT

## 2024-05-26 PROCEDURE — 85025 COMPLETE CBC W/AUTO DIFF WBC: CPT

## 2024-05-26 PROCEDURE — 71275 CT ANGIOGRAPHY CHEST: CPT

## 2024-05-26 RX ORDER — DIPHENHYDRAMINE HYDROCHLORIDE 50 MG/ML
12.5 INJECTION INTRAMUSCULAR; INTRAVENOUS ONCE
Status: COMPLETED | OUTPATIENT
Start: 2024-05-26 | End: 2024-05-26

## 2024-05-26 RX ORDER — BUTALBITAL, ACETAMINOPHEN AND CAFFEINE 50; 325; 40 MG/1; MG/1; MG/1
2 TABLET ORAL
Status: COMPLETED | OUTPATIENT
Start: 2024-05-26 | End: 2024-05-26

## 2024-05-26 RX ORDER — KETOROLAC TROMETHAMINE 30 MG/ML
15 INJECTION, SOLUTION INTRAMUSCULAR; INTRAVENOUS ONCE
Status: COMPLETED | OUTPATIENT
Start: 2024-05-26 | End: 2024-05-26

## 2024-05-26 RX ORDER — DEXAMETHASONE 4 MG/1
8 TABLET ORAL ONCE
Status: COMPLETED | OUTPATIENT
Start: 2024-05-26 | End: 2024-05-26

## 2024-05-26 RX ORDER — METHOCARBAMOL 750 MG/1
750 TABLET, FILM COATED ORAL 4 TIMES DAILY
Qty: 80 TABLET | Refills: 0 | Status: SHIPPED | OUTPATIENT
Start: 2024-05-26 | End: 2024-06-15

## 2024-05-26 RX ORDER — BUTALBITAL, ACETAMINOPHEN AND CAFFEINE 300; 40; 50 MG/1; MG/1; MG/1
2 CAPSULE ORAL EVERY 8 HOURS PRN
Qty: 84 CAPSULE | Refills: 0 | Status: SHIPPED | OUTPATIENT
Start: 2024-05-26

## 2024-05-26 RX ORDER — ONDANSETRON 4 MG/1
4 TABLET, ORALLY DISINTEGRATING ORAL 3 TIMES DAILY PRN
Qty: 21 TABLET | Refills: 0 | Status: SHIPPED | OUTPATIENT
Start: 2024-05-26

## 2024-05-26 RX ORDER — PROCHLORPERAZINE EDISYLATE 5 MG/ML
10 INJECTION INTRAMUSCULAR; INTRAVENOUS EVERY 6 HOURS PRN
Status: DISCONTINUED | OUTPATIENT
Start: 2024-05-26 | End: 2024-05-26 | Stop reason: HOSPADM

## 2024-05-26 RX ORDER — 0.9 % SODIUM CHLORIDE 0.9 %
1000 INTRAVENOUS SOLUTION INTRAVENOUS ONCE
Status: COMPLETED | OUTPATIENT
Start: 2024-05-26 | End: 2024-05-26

## 2024-05-26 RX ORDER — KETOROLAC TROMETHAMINE 30 MG/ML
15 INJECTION, SOLUTION INTRAMUSCULAR; INTRAVENOUS
Status: COMPLETED | OUTPATIENT
Start: 2024-05-26 | End: 2024-05-26

## 2024-05-26 RX ORDER — CALCIUM CARBONATE/VITAMIN D3 500-10/5ML
400 LIQUID (ML) ORAL DAILY
Qty: 30 CAPSULE | Refills: 0 | Status: SHIPPED | OUTPATIENT
Start: 2024-05-26 | End: 2024-06-25

## 2024-05-26 RX ADMIN — DIPHENHYDRAMINE HYDROCHLORIDE 12.5 MG: 50 INJECTION INTRAMUSCULAR; INTRAVENOUS at 12:33

## 2024-05-26 RX ADMIN — IOPAMIDOL 100 ML: 755 INJECTION, SOLUTION INTRAVENOUS at 15:24

## 2024-05-26 RX ADMIN — VERAPAMIL HYDROCHLORIDE 120 MG: 120 TABLET, FILM COATED, EXTENDED RELEASE ORAL at 12:42

## 2024-05-26 RX ADMIN — BUTALBITAL, ACETAMINOPHEN, AND CAFFEINE 2 TABLET: 50; 325; 40 TABLET ORAL at 15:12

## 2024-05-26 RX ADMIN — DEXAMETHASONE 8 MG: 4 TABLET ORAL at 12:32

## 2024-05-26 RX ADMIN — KETOROLAC TROMETHAMINE 15 MG: 30 INJECTION, SOLUTION INTRAMUSCULAR at 15:12

## 2024-05-26 RX ADMIN — KETOROLAC TROMETHAMINE 15 MG: 30 INJECTION, SOLUTION INTRAMUSCULAR at 12:32

## 2024-05-26 RX ADMIN — PROCHLORPERAZINE EDISYLATE 10 MG: 5 INJECTION INTRAMUSCULAR; INTRAVENOUS at 12:33

## 2024-05-26 RX ADMIN — SODIUM CHLORIDE 1000 ML: 9 INJECTION, SOLUTION INTRAVENOUS at 12:31

## 2024-05-26 ASSESSMENT — PAIN DESCRIPTION - ORIENTATION
ORIENTATION: MID

## 2024-05-26 ASSESSMENT — PAIN DESCRIPTION - DESCRIPTORS
DESCRIPTORS: ACHING
DESCRIPTORS: ACHING;THROBBING
DESCRIPTORS: ACHING
DESCRIPTORS: ACHING

## 2024-05-26 ASSESSMENT — PAIN DESCRIPTION - LOCATION
LOCATION: HEAD

## 2024-05-26 ASSESSMENT — PAIN SCALES - GENERAL
PAINLEVEL_OUTOF10: 7
PAINLEVEL_OUTOF10: 10
PAINLEVEL_OUTOF10: 10

## 2024-05-26 ASSESSMENT — PAIN - FUNCTIONAL ASSESSMENT
PAIN_FUNCTIONAL_ASSESSMENT: ACTIVITIES ARE NOT PREVENTED
PAIN_FUNCTIONAL_ASSESSMENT: 0-10
PAIN_FUNCTIONAL_ASSESSMENT: ACTIVITIES ARE NOT PREVENTED

## 2024-05-26 NOTE — ED PROVIDER NOTES
K/uL    MPV 11.5 8.9 - 12.9 FL    Nucleated RBCs 0.0 0  WBC    nRBC 0.00 0.00 - 0.01 K/uL    Neutrophils % 38 32 - 75 %    Lymphocytes % 33 12 - 49 %    Monocytes % 20 (H) 5 - 13 %    Eosinophils % 5 0 - 7 %    Basophils % 2 (H) 0 - 1 %    Immature Granulocytes % 2 (H) 0.0 - 0.5 %    Neutrophils Absolute 1.3 (L) 1.8 - 8.0 K/UL    Lymphocytes Absolute 1.1 0.8 - 3.5 K/UL    Monocytes Absolute 0.6 0.0 - 1.0 K/UL    Eosinophils Absolute 0.2 0.0 - 0.4 K/UL    Basophils Absolute 0.1 0.0 - 0.1 K/UL    Immature Granulocytes Absolute 0.1 (H) 0.00 - 0.04 K/UL    Differential Type AUTOMATED     Comprehensive Metabolic Panel w/ Reflex to MG    Collection Time: 05/26/24 12:04 PM   Result Value Ref Range    Sodium 144 136 - 145 mmol/L    Potassium 3.7 3.5 - 5.1 mmol/L    Chloride 110 (H) 97 - 108 mmol/L    CO2 30 21 - 32 mmol/L    Anion Gap 4 (L) 5 - 15 mmol/L    Glucose 94 65 - 100 mg/dL    BUN 13 6 - 20 MG/DL    Creatinine 1.01 0.55 - 1.02 MG/DL    BUN/Creatinine Ratio 13 12 - 20      Est, Glom Filt Rate 56 (L) >60 ml/min/1.73m2    Calcium 9.2 8.5 - 10.1 MG/DL    Total Bilirubin 0.8 0.2 - 1.0 MG/DL    ALT 32 12 - 78 U/L    AST 15 15 - 37 U/L    Alk Phosphatase 56 45 - 117 U/L    Total Protein 6.2 (L) 6.4 - 8.2 g/dL    Albumin 4.2 3.5 - 5.0 g/dL    Globulin 2.0 2.0 - 4.0 g/dL    Albumin/Globulin Ratio 2.1 1.1 - 2.2     Troponin    Collection Time: 05/26/24 12:04 PM   Result Value Ref Range    Troponin, High Sensitivity 13 0 - 51 ng/L         EKG: documented below     RADIOLOGY:  Non-plain film images such as CT, Ultrasound and MRI are read by the radiologist. Plain radiographic images are visualized and preliminarily interpreted by the ED Provider with the below findings:        Interpretation per the Radiologist below, if available at the time of this note:     CTA CHEST W WO CONTRAST PE Eval   Final Result      1. No pulmonary arterial thromboembolism. There is a borderline enlarged main   pulmonary artery which can be

## 2024-05-26 NOTE — ED TRIAGE NOTES
Pt presents to ED via EMS from home for headache and R sided chest pain with bilateral leg swelling x1-2 weeks. Pt has seen neurology and has MRI on Friday.

## 2024-05-26 NOTE — ED NOTES
Discharge instructions provided. Pt was given copy of discharge instructions with 0 paper script(s) and 4 electronic script(s). Pt verbalized understanding of the medication instructions, and the importance of following up as recommended by EDP. Pt has no further questions at this time. Wheelchair offered from treatment area to hospital entrance, pt declined. Pt leaving ED ambulatory and in stable condition.

## 2024-05-27 LAB
EKG ATRIAL RATE: 72 BPM
EKG DIAGNOSIS: NORMAL
EKG P AXIS: 50 DEGREES
EKG P-R INTERVAL: 170 MS
EKG Q-T INTERVAL: 410 MS
EKG QRS DURATION: 84 MS
EKG QTC CALCULATION (BAZETT): 448 MS
EKG R AXIS: -4 DEGREES
EKG T AXIS: 63 DEGREES
EKG VENTRICULAR RATE: 72 BPM

## 2024-06-26 ENCOUNTER — HOSPITAL ENCOUNTER (OUTPATIENT)
Facility: HOSPITAL | Age: 84
Discharge: HOME | End: 2024-06-29
Payer: MEDICARE

## 2024-06-26 DIAGNOSIS — H53.9 VISUAL DISTURBANCE: ICD-10-CM

## 2024-06-26 DIAGNOSIS — G50.0 TRIGEMINAL NEURALGIA OF LEFT SIDE OF FACE: ICD-10-CM

## 2024-06-26 DIAGNOSIS — G43.001 MIGRAINE WITHOUT AURA, NOT INTRACTABLE, WITH STATUS MIGRAINOSUS: ICD-10-CM

## 2024-06-26 DIAGNOSIS — Z86.73 HISTORY OF TIA (TRANSIENT ISCHEMIC ATTACK): ICD-10-CM

## 2024-06-26 DIAGNOSIS — R42 DIZZINESS AND GIDDINESS: ICD-10-CM

## 2024-06-26 PROCEDURE — 6360000004 HC RX CONTRAST MEDICATION: Performed by: NURSE PRACTITIONER

## 2024-06-26 PROCEDURE — 70553 MRI BRAIN STEM W/O & W/DYE: CPT

## 2024-06-26 PROCEDURE — A9579 GAD-BASE MR CONTRAST NOS,1ML: HCPCS | Performed by: NURSE PRACTITIONER

## 2024-06-26 RX ADMIN — GADOTERIDOL 18 ML: 279.3 INJECTION, SOLUTION INTRAVENOUS at 10:48

## 2024-06-28 ENCOUNTER — TELEPHONE (OUTPATIENT)
Age: 84
End: 2024-06-28

## 2024-06-28 DIAGNOSIS — H05.89 MASS OF ORBIT: ICD-10-CM

## 2024-06-28 DIAGNOSIS — R90.89 ABNORMAL BRAIN MRI: Primary | ICD-10-CM

## 2024-06-28 NOTE — TELEPHONE ENCOUNTER
Patient returned call to discuss results. Stated she will have to leave soon to  her grandchild from school. Please call [phone]

## 2024-07-03 ENCOUNTER — TELEPHONE (OUTPATIENT)
Age: 84
End: 2024-07-03

## 2024-07-03 DIAGNOSIS — R90.89 ABNORMAL BRAIN MRI: Primary | ICD-10-CM

## 2024-07-03 DIAGNOSIS — R90.89 ABNORMAL BRAIN MRI: ICD-10-CM

## 2024-07-03 DIAGNOSIS — H05.89 ORBITAL MASS: Primary | ICD-10-CM

## 2024-07-03 DIAGNOSIS — H05.89 ORBITAL MASS: ICD-10-CM

## 2024-07-03 NOTE — TELEPHONE ENCOUNTER
Faxed referral, face sheet, ins cards, OV from 6- & 7-3-24, OV from 5-16-24, MRI brain 6-26-24 to Dr. Jeison Richardson, ph # [phone], fax # [phone].    Confirmation received.

## 2024-07-03 NOTE — TELEPHONE ENCOUNTER
Venus Awan NP was unable to cancel the CT orbit in Epic.  Spoke with Scarlett in [facility], cancelled CT Orbit, proceed with MRI orbit.  Scarlett was able to take care of cancelling CT orbit.  She will also call and confirm eveything with the pt.  Scarlett verbalized understanding.

## 2024-07-11 ENCOUNTER — HOSPITAL ENCOUNTER (OUTPATIENT)
Facility: HOSPITAL | Age: 84
Discharge: HOME | End: 2024-07-11
Payer: MEDICARE

## 2024-07-11 DIAGNOSIS — H05.89 ORBITAL MASS: ICD-10-CM

## 2024-07-11 DIAGNOSIS — R90.89 ABNORMAL BRAIN MRI: ICD-10-CM

## 2024-07-11 PROCEDURE — A9579 GAD-BASE MR CONTRAST NOS,1ML: HCPCS | Performed by: NURSE PRACTITIONER

## 2024-07-11 PROCEDURE — 70543 MRI ORBT/FAC/NCK W/O &W/DYE: CPT

## 2024-07-11 PROCEDURE — 6360000004 HC RX CONTRAST MEDICATION: Performed by: NURSE PRACTITIONER

## 2024-07-11 RX ADMIN — GADOTERIDOL 19 ML: 279.3 INJECTION, SOLUTION INTRAVENOUS at 16:00

## 2024-07-16 ENCOUNTER — TELEPHONE (OUTPATIENT)
Age: 84
End: 2024-07-16

## 2024-07-16 NOTE — TELEPHONE ENCOUNTER
Patient daughter Toya calling back for an update on this. I advised a response could take 24-48 hours. She verbalized understanding. Please call her back at . Thank you.

## 2024-07-18 NOTE — TELEPHONE ENCOUNTER
Daughter, Toya called.  Verified patient with two patient identifiers.    States they did not know she even had a mass, so hearing it is stable was a shock.  States they need to speak with Venus Awan NP to discuss the details of the mass.    Advised will forward note to Venus.  Toya verbalized understanding.        The MRI of orbits notes this mass has not changed since 2020. I still think it is a good idea to see the Neuro-ophthalmologist, do you have an appointment yet?  EN

## 2024-10-10 RX ORDER — VERAPAMIL HYDROCHLORIDE 120 MG/1
TABLET, FILM COATED, EXTENDED RELEASE ORAL
Qty: 90 TABLET | Refills: 0 | Status: SHIPPED | OUTPATIENT
Start: 2024-10-10

## 2024-10-17 ENCOUNTER — HOSPITAL ENCOUNTER (OUTPATIENT)
Facility: HOSPITAL | Age: 84
Discharge: HOME | End: 2024-10-20
Attending: INTERNAL MEDICINE
Payer: MEDICARE

## 2024-10-17 PROCEDURE — 71250 CT THORAX DX C-: CPT

## 2024-11-10 NOTE — MR AVS SNAPSHOT
Visit Information Date & Time Provider Department Dept. Phone Encounter #  
 11/13/2017 10:00 AM MD Aryan Bazan [facility] at [facility] [phone] [id number] Follow-up Instructions Return in about 6 months (around 5/13/2018). Upcoming Health Maintenance Date Due DTaP/Tdap/Td series (1 - Tdap) 3/4/1963 ZOSTER VACCINE AGE 60> 1/4/2002 GLAUCOMA SCREENING Q2Y 3/4/2007 OSTEOPOROSIS SCREENING (DEXA) 3/4/2007 Pneumococcal 65+ Low/Medium Risk (1 of 2 - PCV13) 3/4/2007 MEDICARE YEARLY EXAM 3/4/2007 Influenza Age 5 to Adult 8/1/2017 Allergies as of 11/13/2017  Review Complete On: 11/13/2017 By: Ricki Libman, MD  
  
 Severity Noted Reaction Type Reactions Codeine  04/10/2017    Swelling Erythromycin  04/10/2017    Itching And swelling Lovastatin  04/10/2017    Swelling Minocin [Minocycline]  04/10/2017    Swelling Naproxen  08/09/2017    Drowsiness Neurontin [Gabapentin]  04/10/2017    Swelling Pcn [Penicillins]  04/10/2017    Swelling Sulfa (Sulfonamide Antibiotics)  04/10/2017    Itching Tramadol  08/09/2017    Drowsiness Current Immunizations  Never Reviewed No immunizations on file. Not reviewed this visit You Were Diagnosed With   
  
 Codes Comments Dizziness    -  Primary ICD-10-CM: F95 ICD-9-CM: 780.4 Migraine without aura and without status migrainosus, not intractable     ICD-10-CM: J09.033 ICD-9-CM: 346.10 Paresthesia     ICD-10-CM: R20.2 ICD-9-CM: 546. 0 Vertebral arthropathy     ICD-10-CM: M95.8 ICD-9-CM: 721.90 Vitals BP Pulse Temp Resp Height(growth percentile) 131/77 (BP 1 Location: Left arm, BP Patient Position: Sitting) 73 98.1 °F (36.7 °C) (Temporal) 18 5' (1.524 m) Weight(growth percentile) SpO2 BMI OB Status Smoking Status 201 lb 9.6 oz (91.4 kg) 97% 39.37 kg/m2 Menopause Former Smoker BMI and BSA Data Body Mass Index Body Surface Area  
 39.37 kg/m 2 1.97 m 2 Preferred Pharmacy Pharmacy Name Phone [facility] [address] Marilee Haywood [phone] Your Updated Medication List  
  
   
This list is accurate as of: 11/13/17 10:25 AM.  Always use your most recent med list.  
  
  
  
  
 aspirin delayed-release 81 mg tablet Take  by mouth daily. diazePAM 10 mg tablet Commonly known as:  VALIUM Take 1 Tab by mouth every twelve (12) hours as needed for Anxiety. Max Daily Amount: 20 mg.  
  
 folic acid 1 mg tablet Commonly known as:  Google Take  by mouth daily. hydroxychloroquine 200 mg tablet Commonly known as:  PLAQUENIL Take 200 mg by mouth two (2) times a day. levothyroxine 100 mcg tablet Commonly known as:  SYNTHROID  
120 mcg daily. meclizine 25 mg tablet Commonly known as:  ANTIVERT Take 1 Tab by mouth nightly. methotrexate 2.5 mg tablet Commonly known as:  Citlalli Bye Take  by mouth every Sunday. 6 capsules Oxygen  
nightly. 2 liters [address] [place] [address] Gervais Take 2 Puffs by inhalation daily. verapamil  mg ER capsule Commonly known as:  Rosia Quick Take 1 Cap by mouth daily. VITAMIN D3 2,000 unit Tab Generic drug:  cholecalciferol (vitamin D3) Take  by mouth daily. Follow-up Instructions Return in about 6 months (around 5/13/2018). Introducing [facility]! [facility] introduces [employer]App[employer] patient portal. Now you can access parts of your medical record, email your doctor's office, and request medication refills online. 1. In your internet browser, go to [email or web address] 2. Click on the First Time User? Click Here link in the Sign In box. You will see the New Member Sign Up page. 3. Enter your StudyApps Access Code exactly as it appears below.  You will not need to use this code after youve completed the sign-up process. If you do not sign up before the expiration date, you must request a new code. · [employer] Access Code: [email or web address] Expires: 2/11/2018 10:25 AM 
 
4. Enter the last four digits of your Social Security Number (xxxx) and Date of Birth (mm/dd/yyyy) as indicated and click Submit. You will be taken to the next sign-up page. 5. Create a Online[employer] ID. This will be your Online[employer] login ID and cannot be changed, so think of one that is secure and easy to remember. 6. Create a [employer] password. You can change your password at any time. 7. Enter your Password Reset Question and Answer. This can be used at a later time if you forget your password. 8. Enter your e-mail address. You will receive e-mail notification when new information is available in [address] 9. Click Sign Up. You can now view and download portions of your medical record. 10. Click the Download Summary menu link to download a portable copy of your medical information. If you have questions, please visit the Frequently Asked Questions section of the [employer] website. Remember, [employer] is NOT to be used for urgent needs. For medical emergencies, dial 911. Now available from your iPhone and Android! Please provide this summary of care documentation to your next provider. Your primary care clinician is listed as Ricky Avery. If you have any questions after today's visit, please call [phone]. Student

## 2024-12-18 RX ORDER — VERAPAMIL HYDROCHLORIDE 120 MG/1
TABLET, FILM COATED, EXTENDED RELEASE ORAL
Qty: 90 TABLET | Refills: 0 | Status: SHIPPED | OUTPATIENT
Start: 2024-12-18

## 2025-02-10 ENCOUNTER — TELEPHONE (OUTPATIENT)
Age: 85
End: 2025-02-10

## 2025-02-10 NOTE — TELEPHONE ENCOUNTER
Patient called stating that she is recovering from a broken shoulder/arm and is fearful to come in office on 2/14 as she does not want to risk falling. Asking if her appt would be VV instead. Please advise. [phone]    *Can leave detailed VM if Pt does not answer*

## 2025-02-10 NOTE — TELEPHONE ENCOUNTER
Left message on Identified VM. Advised per Venus bowen to switch to a VV. Advised to call office at [phone] with any further concerns.

## 2025-02-13 ENCOUNTER — TELEPHONE (OUTPATIENT)
Age: 85
End: 2025-02-13

## 2025-02-13 NOTE — TELEPHONE ENCOUNTER
Patient scheduled for an apptmt for tomorrow morning at 8am. She requested to have a phone apptmt. Pt has been recving reminders that its for a VV. She would like a call back due to her recently fall she is not able to get around well. Please call pt to confirm her apptmt at [phone]

## 2025-05-05 RX ORDER — VERAPAMIL HYDROCHLORIDE 120 MG/1
120 TABLET, FILM COATED, EXTENDED RELEASE ORAL DAILY
Qty: 90 TABLET | Refills: 0 | Status: SHIPPED | OUTPATIENT
Start: 2025-05-05

## 2025-06-18 ENCOUNTER — OFFICE VISIT (OUTPATIENT)
Age: 85
End: 2025-06-18
Payer: MEDICARE

## 2025-06-18 VITALS
TEMPERATURE: 97.9 F | HEIGHT: 61 IN | BODY MASS INDEX: 39.95 KG/M2 | HEART RATE: 85 BPM | SYSTOLIC BLOOD PRESSURE: 128 MMHG | WEIGHT: 211.6 LBS | RESPIRATION RATE: 19 BRPM | OXYGEN SATURATION: 96 % | DIASTOLIC BLOOD PRESSURE: 60 MMHG

## 2025-06-18 DIAGNOSIS — G47.9 SLEEP DISORDER: ICD-10-CM

## 2025-06-18 DIAGNOSIS — H05.89 ORBITAL MASS: ICD-10-CM

## 2025-06-18 DIAGNOSIS — G43.001 MIGRAINE WITHOUT AURA, NOT INTRACTABLE, WITH STATUS MIGRAINOSUS: ICD-10-CM

## 2025-06-18 DIAGNOSIS — H53.9 VISUAL DISTURBANCE: ICD-10-CM

## 2025-06-18 DIAGNOSIS — Z86.73 HISTORY OF TIA (TRANSIENT ISCHEMIC ATTACK): Primary | ICD-10-CM

## 2025-06-18 PROCEDURE — 1123F ACP DISCUSS/DSCN MKR DOCD: CPT | Performed by: NURSE PRACTITIONER

## 2025-06-18 PROCEDURE — 1036F TOBACCO NON-USER: CPT | Performed by: NURSE PRACTITIONER

## 2025-06-18 PROCEDURE — G8427 DOCREV CUR MEDS BY ELIG CLIN: HCPCS | Performed by: NURSE PRACTITIONER

## 2025-06-18 PROCEDURE — 1090F PRES/ABSN URINE INCON ASSESS: CPT | Performed by: NURSE PRACTITIONER

## 2025-06-18 PROCEDURE — 99214 OFFICE O/P EST MOD 30 MIN: CPT | Performed by: NURSE PRACTITIONER

## 2025-06-18 PROCEDURE — G8400 PT W/DXA NO RESULTS DOC: HCPCS | Performed by: NURSE PRACTITIONER

## 2025-06-18 PROCEDURE — G8417 CALC BMI ABV UP PARAM F/U: HCPCS | Performed by: NURSE PRACTITIONER

## 2025-06-18 PROCEDURE — 1126F AMNT PAIN NOTED NONE PRSNT: CPT | Performed by: NURSE PRACTITIONER

## 2025-06-18 PROCEDURE — 1160F RVW MEDS BY RX/DR IN RCRD: CPT | Performed by: NURSE PRACTITIONER

## 2025-06-18 PROCEDURE — 1159F MED LIST DOCD IN RCRD: CPT | Performed by: NURSE PRACTITIONER

## 2025-06-18 RX ORDER — TOPIRAMATE 25 MG/1
25 TABLET, FILM COATED ORAL 2 TIMES DAILY
Qty: 60 TABLET | Refills: 4 | Status: SHIPPED | OUTPATIENT
Start: 2025-06-18

## 2025-06-18 ASSESSMENT — PATIENT HEALTH QUESTIONNAIRE - PHQ9
SUM OF ALL RESPONSES TO PHQ QUESTIONS 1-9: 2
1. LITTLE INTEREST OR PLEASURE IN DOING THINGS: SEVERAL DAYS
SUM OF ALL RESPONSES TO PHQ QUESTIONS 1-9: 2
2. FEELING DOWN, DEPRESSED OR HOPELESS: SEVERAL DAYS
SUM OF ALL RESPONSES TO PHQ QUESTIONS 1-9: 2
SUM OF ALL RESPONSES TO PHQ QUESTIONS 1-9: 2

## 2025-06-18 NOTE — PROGRESS NOTES
[facility]  [address]  [address]  [place] [address]  Tel: [phone]  Fax: [phone]      Date:  25     Name:  EVA ZENG  :  1940  MRN:  [id number]     PCP:  Roge Logan, TOBIAS Rai NP    Chief Complaint   Patient presents with    Follow-up     Headache since last fall treated at [facility].       HISTORY OF PRESENT ILLNESS:  History of Present Illness  The patient is an 85-year-old female here today for a regular follow-up appointment. She was last seen in 2024 and has a notable history of a transient ischemic attack (TIA), headaches, visual disturbances, and left trigeminal neuralgia. Due to the persistence of symptoms, a brain MRI was ordered and completed in 2024, which showed mild atrophy and nonspecific white matter changes but no acute changes or mass. An incidental mass around the left orbit was noted, prompting further investigation. A subsequent MRI of the orbit and face confirmed the mass was stable, and follow-up with neuro-ophthalmology was recommended.    She has been experiencing persistent headaches since her last fall, she rates her current headache pain as a 3 on a scale of 1 to 10, describing it as constant and recurring after brief periods of relief. She is not currently taking any medication for her headaches other than the Verapamil, hasn't taken the Tegretol for nearly a year. She reports no new vision changes or worsening of her condition. Occasional dizziness is noted, but she reports no numbness, tingling, or burning sensations. She has completed her physical therapy and reports feeling better overall.      She has a history of two significant falls. The first occurred on , resulting in a crushed Left rotator cuff, elbow injury, and a broken arm. She underwent surgery for the elbow injury and was placed in a cast for 6 to 8 weeks, followed by a brace. Her hospital stay extended from  until three days

## 2025-07-10 ENCOUNTER — TELEPHONE (OUTPATIENT)
Age: 85
End: 2025-07-10

## 2025-07-10 NOTE — TELEPHONE ENCOUNTER
Patient called to let Parker Awan know that the topiramate (TOPAMAX) 25 MG tablet is working very well for her. States her sleeping habits are better- she is now able to wake up at 8-9 AM instead of sleeping majority of the day. Her headaches are very seldom now and when she does have one, it is not at all as bad as it used to be. Pt asked that I express her thanks to Parker Awan.

## 2025-08-07 RX ORDER — VERAPAMIL HYDROCHLORIDE 120 MG/1
120 TABLET, FILM COATED, EXTENDED RELEASE ORAL DAILY
Qty: 90 TABLET | Refills: 2 | Status: SHIPPED | OUTPATIENT
Start: 2025-08-07

## 2025-08-25 ENCOUNTER — TRANSCRIBE ORDERS (OUTPATIENT)
Facility: HOSPITAL | Age: 85
End: 2025-08-25

## 2025-08-25 DIAGNOSIS — C34.12 SQUAMOUS CELL CARCINOMA OF BRONCHUS IN LEFT UPPER LOBE (HCC): Primary | ICD-10-CM

## (undated) DEVICE — SYRINGE MED 30ML STD CLR PLAS LUERLOCK TIP N CTRL DISP

## (undated) DEVICE — SYR 5ML 1/5 GRAD LL NSAF LF --

## (undated) DEVICE — SYRINGE MED 5ML STD CLR PLAS LUERLOCK TIP N CTRL DISP

## (undated) DEVICE — TOWEL OR BL STR 4/PK -- MEDICHOICE - MEDLINE

## (undated) DEVICE — SOLUTION IV 250ML 0.9% SOD CHL CLR INJ FLX BG CONT PRT CLSR

## (undated) DEVICE — SYR LR LCK 1ML GRAD NSAF 30ML --

## (undated) DEVICE — VISCOAT 0.50ML 27G<USA: Brand: VISCOAT

## (undated) DEVICE — THE MONARCH® "D" CARTRIDGE IS A SINGLE-USE POLYPROPYLENE CARTRIDGE FOR POSTERIOR CHAMBER IOL DELIVERY: Brand: MONARCH® III

## (undated) DEVICE — SURGICAL PROCEDURE PACK CATRCT CUST

## (undated) DEVICE — SOLUTION IRRIG 500ML STRL H2O NONPYROGENIC

## (undated) DEVICE — SYRINGE MED 3ML CLR PLAS STD N CTRL LUERLOCK TIP DISP

## (undated) DEVICE — TOWEL SURG W17XL27IN STD BLU COT NONFENESTRATED PREWASHED

## (undated) DEVICE — SYRINGE INSULIN 1ML LUERSLIP TIP W/O SFTY U100 BLISTER PK

## (undated) DEVICE — SYR 10ML LUER LOK 1/5ML GRAD --

## (undated) DEVICE — SYRINGE MED 10ML LUERLOCK TIP W/O SFTY DISP

## (undated) DEVICE — GLOVE ORANGE PI 7   MSG9070

## (undated) DEVICE — Device

## (undated) DEVICE — BLUNTFILL WITH FILTER: Brand: MONOJECT

## (undated) DEVICE — SYR 3ML LL TIP 1/10ML GRAD --

## (undated) DEVICE — ADULT SPO2 SENSOR: Brand: NELLCOR